# Patient Record
Sex: MALE | Race: WHITE | NOT HISPANIC OR LATINO | Employment: OTHER | URBAN - METROPOLITAN AREA
[De-identification: names, ages, dates, MRNs, and addresses within clinical notes are randomized per-mention and may not be internally consistent; named-entity substitution may affect disease eponyms.]

---

## 2017-01-16 ENCOUNTER — HOSPITAL ENCOUNTER (OUTPATIENT)
Dept: CT IMAGING | Facility: HOSPITAL | Age: 72
Discharge: HOME | End: 2017-01-16
Admitting: NURSE PRACTITIONER

## 2017-01-16 DIAGNOSIS — J15.7 PNEUMONIA OF RIGHT LOWER LOBE DUE TO MYCOPLASMA PNEUMONIAE: ICD-10-CM

## 2017-01-16 PROCEDURE — 71250 CT THORAX DX C-: CPT

## 2017-02-09 ENCOUNTER — OFFICE VISIT (OUTPATIENT)
Dept: PULMONOLOGY | Facility: CLINIC | Age: 72
End: 2017-02-09

## 2017-02-09 VITALS
HEART RATE: 70 BPM | BODY MASS INDEX: 27.24 KG/M2 | HEIGHT: 71 IN | WEIGHT: 194.6 LBS | OXYGEN SATURATION: 95 % | SYSTOLIC BLOOD PRESSURE: 100 MMHG | DIASTOLIC BLOOD PRESSURE: 60 MMHG | RESPIRATION RATE: 16 BRPM | TEMPERATURE: 97.4 F

## 2017-02-09 DIAGNOSIS — R91.8 ABNORMAL CT SCAN, LUNG: ICD-10-CM

## 2017-02-09 DIAGNOSIS — J15.7 PNEUMONIA OF RIGHT LOWER LOBE DUE TO MYCOPLASMA PNEUMONIAE: Primary | ICD-10-CM

## 2017-02-09 DIAGNOSIS — L13.8 LINEAR IGA DERMATOSIS: ICD-10-CM

## 2017-02-09 PROCEDURE — 99213 OFFICE O/P EST LOW 20 MIN: CPT | Performed by: NURSE PRACTITIONER

## 2017-02-09 NOTE — PROGRESS NOTES
[facility] Pulmonary Follow up    CHIEF COMPLAINT    Abnormal CT, follow-up    HISTORY OF PRESENT ILLNESS    Uriel Valverde is a 71 y.o.male here today for follow-up on his CT scan.  He has had a dense right lower lobe opacity starting back in December.  He completed a ten-day course of doxycycline.  His workup was negative except for a mycoplasma IgG that was elevated over 500.  He developed a cough while he was in [place] from August to November.  Today he comes in for follow-up and evaluation of his repeat CT scan.  His scan is dramatically improved since the one in December.  He has no cough.  He has no shortness of breath.  No fevers or chills.  He's followed up with infectious disease Dr. 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          Patient Active Problem List   Diagnosis   • Hypertension   • A-fib   • Allergic rhinitis   • Linear IgA dermatosis   • Insomnia   • Pneumonia of right lower lobe due to Mycoplasma pneumoniae   • Abnormal CT scan, lung       No Known Allergies    Current Outpatient Prescriptions:   •  allopurinol (ZYLOPRIM) 100 MG tablet, , Disp: , Rfl:   •  colchicine 0.6 MG tablet, Take 0.6 mg by mouth Daily., Disp: , Rfl:   •  dapsone 100 MG tablet, , Disp: , Rfl:   •  DiphenhydrAMINE HCl (BENADRYL ALLERGY PO), Take  by mouth., Disp: , Rfl:   •  doxazosin (CARDURA) 4 MG tablet, , Disp: , Rfl:   •  doxycycline (VIBRAMYICN) 100 MG tablet, , Disp: , Rfl:   •  Emollient (CETAPHIL DAILY ADVANCE) lotion, Apply  topically., Disp: , Rfl:   •  fexofenadine (ALLEGRA) 180 MG tablet, Take 180 mg by mouth Daily., Disp: , Rfl:   •  hydrOXYzine (ATARAX) 10 MG/5ML syrup, , Disp: , Rfl:   •  levoFLOXacin (LEVAQUIN) 750 MG tablet, , Disp: , Rfl:   •  mycophenolate (CELLCEPT) 500 MG tablet, , Disp: , Rfl:   •  traZODone (DESYREL) 100 MG tablet, , Disp: , Rfl:   •  Triamcinolone Acetonide 0.05 % ointment, Apply  topically., Disp: , Rfl:   •  warfarin (COUMADIN) 1 MG tablet, , Disp: ,  "Rfl:   MEDICATION LIST AND ALLERGIES REVIEWED.    Social History   Substance Use Topics   • Smoking status: Former Smoker     Packs/day: 0.50     Years: 20.00     Quit date: 12/22/1978   • Smokeless tobacco: Not on file   • Alcohol use 1.2 oz/week     2 Glasses of wine per week       FAMILY AND SOCIAL HISTORY REVIEWED.    Review of Systems   Constitutional: Negative for chills, fatigue, fever and unexpected weight change.   HENT: Negative for congestion, nosebleeds, postnasal drip, rhinorrhea, sinus pressure and trouble swallowing.    Respiratory: Negative for cough, chest tightness, shortness of breath and wheezing.    Cardiovascular: Negative for chest pain and leg swelling.   Gastrointestinal: Negative for abdominal pain, constipation, diarrhea, nausea and vomiting.   Genitourinary: Negative for dysuria, frequency, hematuria and urgency.   Musculoskeletal: Negative for myalgias.   Neurological: Negative for dizziness, weakness, numbness and headaches.   All other systems reviewed and are negative.  .    Visit Vitals   • /60   • Pulse 70   • Temp 97.4 °F (36.3 °C)   • Resp 16   • Ht 71\" (180.3 cm)   • Wt 194 lb 9.6 oz (88.3 kg)   • SpO2 95%  Comment: RA   • BMI 27.14 kg/m2     Physical Exam   Constitutional: He is oriented to person, place, and time. He appears well-developed and well-nourished.   HENT:   Head: Normocephalic and atraumatic.   Eyes: EOM are normal. Pupils are equal, round, and reactive to light.   Neck: Normal range of motion. Neck supple.   Cardiovascular: Normal rate and regular rhythm.    No murmur heard.  Pulmonary/Chest: Effort normal and breath sounds normal. No respiratory distress. He has no wheezes. He has no rales.   Abdominal: Soft. Bowel sounds are normal. He exhibits no distension.   Musculoskeletal: Normal range of motion. He exhibits no edema.   Neurological: He is alert and oriented to person, place, and time.   Skin: Skin is warm and dry. No erythema.   Psychiatric: He has a " normal mood and affect. His behavior is normal.   Vitals reviewed.      RESULTS    CT chest 1/16/2017  1. Incomplete CT data set of the chest which fails to demonstrate the  lower thirds of the lungs.  2. There is residual airspace linear nodular opacity in the right lower  lobe, however. The degree of airspace opacity, the density of  consolidation and the air bronchograms are dramatically improved over  the 6 week interval since previous studies, however, the process is not  completely cleared. Continual follow-up in 6-8 weeks is suggested to  confirm total resolution of this process.  3. No other acute focal abnormality is identified.         PROBLEM LIST    Problem List Items Addressed This Visit        Respiratory    Pneumonia of right lower lobe due to Mycoplasma pneumoniae - Primary    Overview     Initially seen on CT scan 12/7/2016, treated with 10 day course of doxycycline and Levaquin            Musculoskeletal and Integument    Linear IgA dermatosis    Overview     Follow with Dr Whitten in [facility] for treatment - on cellcept            Other    Abnormal CT scan, lung    Overview     RLL opacity with air bronchograms                   DISCUSSION    His CT scan has significantly improved in the last 6 weeks.  He will need a follow-up scan in May per Dr. Candelario with infectious disease, to assure resolution.  He has a follow-up with Dr. Candelario after that.  Otherwise he is done quite well and his right lower lobe mycoplasma pneumonia seems to be resolving.    Follow-up as needed, or after his scan in May if any further services are needed.    Rivka Hyde, GARETT  02/09/20172:22 PM  Electronically signed     Please note that portions of this note were completed with a voice recognition program. Efforts were made to edit the dictations, but occasionally words are mistranscribed.      CC: Barbi Carlson MD

## 2017-02-14 ENCOUNTER — TRANSCRIBE ORDERS (OUTPATIENT)
Dept: ADMINISTRATIVE | Facility: HOSPITAL | Age: 72
End: 2017-02-14

## 2017-02-14 DIAGNOSIS — A31.2 DISSEMINATED MYCOBACTERIUM AVIUM-INTRACELLULARE COMPLEX (HCC): Primary | ICD-10-CM

## 2017-02-14 DIAGNOSIS — J18.1 PNEUMONIA, LOBAR (HCC): ICD-10-CM

## 2017-05-12 ENCOUNTER — HOSPITAL ENCOUNTER (OUTPATIENT)
Dept: CT IMAGING | Facility: HOSPITAL | Age: 72
Discharge: HOME | End: 2017-05-12
Attending: INTERNAL MEDICINE | Admitting: INTERNAL MEDICINE

## 2017-05-12 DIAGNOSIS — A31.2 DISSEMINATED MYCOBACTERIUM AVIUM-INTRACELLULARE COMPLEX (HCC): ICD-10-CM

## 2017-05-12 DIAGNOSIS — J18.1 PNEUMONIA, LOBAR (HCC): ICD-10-CM

## 2017-05-12 PROCEDURE — 71250 CT THORAX DX C-: CPT

## 2018-12-22 PROBLEM — E66.3 OVERWEIGHT (BMI 25.0-29.9): Status: ACTIVE | Noted: 2017-05-10

## 2018-12-22 PROBLEM — H10.31 ACUTE BACTERIAL CONJUNCTIVITIS OF RIGHT EYE: Status: ACTIVE | Noted: 2018-12-14

## 2018-12-22 PROBLEM — H66.015 RECURRENT ACUTE SUPPURATIVE OTITIS MEDIA WITH SPONTANEOUS RUPTURE OF LEFT TYMPANIC MEMBRANE: Status: ACTIVE | Noted: 2018-12-14

## 2019-01-02 ENCOUNTER — OFFICE VISIT (OUTPATIENT)
Dept: INTERNAL MEDICINE | Facility: CLINIC | Age: 74
End: 2019-01-02

## 2019-01-02 VITALS
HEART RATE: 66 BPM | HEIGHT: 75 IN | SYSTOLIC BLOOD PRESSURE: 128 MMHG | WEIGHT: 223 LBS | BODY MASS INDEX: 27.73 KG/M2 | TEMPERATURE: 98.4 F | DIASTOLIC BLOOD PRESSURE: 84 MMHG

## 2019-01-02 DIAGNOSIS — E53.8 B12 DEFICIENCY: ICD-10-CM

## 2019-01-02 DIAGNOSIS — E78.2 MIXED HYPERLIPIDEMIA: Chronic | ICD-10-CM

## 2019-01-02 DIAGNOSIS — E55.9 VITAMIN D DEFICIENCY: ICD-10-CM

## 2019-01-02 DIAGNOSIS — I10 BENIGN ESSENTIAL HYPERTENSION: ICD-10-CM

## 2019-01-02 DIAGNOSIS — I83.893 VARICOSE VEINS OF BOTH LEGS WITH EDEMA: ICD-10-CM

## 2019-01-02 DIAGNOSIS — I48.20 CHRONIC ATRIAL FIBRILLATION (HCC): Primary | ICD-10-CM

## 2019-01-02 DIAGNOSIS — Z79.01 ANTICOAGULATED ON COUMADIN: ICD-10-CM

## 2019-01-02 PROBLEM — H66.015 RECURRENT ACUTE SUPPURATIVE OTITIS MEDIA WITH SPONTANEOUS RUPTURE OF LEFT TYMPANIC MEMBRANE: Status: RESOLVED | Noted: 2018-12-14 | Resolved: 2019-01-02

## 2019-01-02 PROCEDURE — 99214 OFFICE O/P EST MOD 30 MIN: CPT | Performed by: INTERNAL MEDICINE

## 2019-01-02 RX ORDER — INFLUENZA VIRUS VACCINE 15; 15; 15; 15 UG/.5ML; UG/.5ML; UG/.5ML; UG/.5ML
SUSPENSION INTRAMUSCULAR
Refills: 0 | COMMUNITY
Start: 2018-10-10 | End: 2020-01-09

## 2019-01-02 RX ORDER — WARFARIN SODIUM 5 MG/1
TABLET ORAL
COMMUNITY
Start: 2018-10-07 | End: 2019-06-24 | Stop reason: SDUPTHER

## 2019-01-02 RX ORDER — WARFARIN SODIUM 10 MG/1
TABLET ORAL
COMMUNITY
Start: 2018-11-19 | End: 2019-05-20 | Stop reason: SDUPTHER

## 2019-01-02 RX ORDER — WARFARIN SODIUM 4 MG/1
TABLET ORAL
COMMUNITY
Start: 2018-10-07 | End: 2019-06-24 | Stop reason: SDUPTHER

## 2019-01-02 RX ORDER — CHOLECALCIFEROL (VITAMIN D3) 125 MCG
1 CAPSULE ORAL DAILY
COMMUNITY
Start: 2016-06-14 | End: 2021-07-21

## 2019-01-02 RX ORDER — THIAMINE HCL 100 MG
1 TABLET ORAL EVERY OTHER DAY
COMMUNITY
Start: 2017-05-10 | End: 2020-01-16

## 2019-01-02 RX ORDER — WARFARIN SODIUM 1 MG/1
TABLET ORAL
COMMUNITY
Start: 2018-06-08 | End: 2020-01-09

## 2019-01-02 RX ORDER — FOLIC ACID 1 MG/1
1 TABLET ORAL DAILY
COMMUNITY
Start: 2017-10-10

## 2019-01-02 NOTE — PROGRESS NOTES
[facility]     Uriel Valverde  1945   [id number]      Patient Care Team:  Barbi Carlson MD as PCP - General (Internal Medicine)  Ashley Knight PA as Physician Assistant (Dermatology)  Juana Vyas MD as Consulting Physician (Dermatology)    Chief Complaint;:   Chief Complaint   Patient presents with   • Follow-up            HPI  Patient is a 73 y.o. male presents with f/u of afib and anticoagulation  CHRONIC CONDITIONS  He takes medications as prescribed. Gets pt/INR as ordered.  No signs of bleeding. No rapid heart rate or palpitations. Denies chest pain/edema/shortness of breath. He eats a low fat diet and exercises regularly.     Past Medical History:   Diagnosis Date   • A-fib (CMS/HCC)     chronic coumadin   • A-fib (CMS/HCC)     failed cardioversion, normal echo and exercise nuclear stress test.  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   • Insomnia    • Linear IgA dermatosis     Follow with Dr Whitten in [facility] for treatment - on cellcept   • Spongiotic dermatitis    • Vitamin B12 deficiency        Past Surgical History:   Procedure Laterality Date   • HERNIA REPAIR     • KNEE ARTHROSCOPY Right 2003    due to meniscus tear   • TONSILLECTOMY      as a child       Family History   Problem Relation Age of Onset   • Heart failure Father    • Stroke Sister 63       Social History     Socioeconomic History   • Marital status:      Spouse name: Not on file   • Number of children: Not on file   • Years of education: Not on file   • Highest education level: Not on file   Social Needs   • Financial resource strain: Not on file   • Food insecurity - worry: Not on file   • Food insecurity - inability: Not on file   • Transportation needs -  "medical: Not on file   • Transportation needs - non-medical: Not on file   Occupational History   • Not on file   Tobacco Use   • Smoking status: Former Smoker     Packs/day: 0.50     Years: 20.00     Pack years: 10.00     Last attempt to quit: 1978     Years since quittin.0   Substance and Sexual Activity   • Alcohol use: Yes     Alcohol/week: 1.2 oz     Types: 2 Glasses of wine per week   • Drug use: No   • Sexual activity: Not on file   Other Topics Concern   • Not on file   Social History Narrative   • Not on file       Allergies   Allergen Reactions   • Other Rash     Soy, mold, and wheat       Review of Systems:     Review of Systems   Constitutional: Negative for fever, unexpected weight gain and unexpected weight loss.   HENT: Negative for sore throat and trouble swallowing.    Eyes: Negative for visual disturbance.   Respiratory: Negative for cough, shortness of breath and wheezing.    Cardiovascular: Negative for chest pain, palpitations and leg swelling.   Gastrointestinal: Negative for abdominal pain, blood in stool and diarrhea.   Endocrine: Negative for cold intolerance and heat intolerance.   Genitourinary: Negative for difficulty urinating and dysuria.   Musculoskeletal: Negative for back pain, gait problem and joint swelling.   Skin: Negative for rash and skin lesions.   Allergic/Immunologic: Negative for food allergies and immunocompromised state.   Neurological: Negative for dizziness and memory problem.   Hematological: Negative for adenopathy. Does not bruise/bleed easily.   Psychiatric/Behavioral: Negative for sleep disturbance and depressed mood. The patient is not nervous/anxious.        Vital Signs  Vitals:    19 1435   BP: 128/84   BP Location: Left arm   Patient Position: Sitting   Cuff Size: Adult   Pulse: 66   Temp: 98.4 °F (36.9 °C)   TempSrc: Temporal   Weight: 101 kg (223 lb)   Height: 190.5 cm (75\")   PainSc: 0-No pain   Body mass index is 27.87 kg/m².        Current " Outpatient Medications:   •  Cholecalciferol (VITAMIN D3) 2000 units tablet, Take 1 tablet by mouth Daily., Disp: , Rfl:   •  folic acid (FOLVITE) 1 MG tablet, Take 1 tablet by mouth Daily., Disp: , Rfl:   •  vitamin B-12 (CYANOCOBALAMIN) 2500 MCG sublingual tablet tablet, Place 1 tablet under the tongue Every Other Day., Disp: , Rfl:   •  warfarin (COUMADIN) 1 MG tablet, Take  by mouth., Disp: , Rfl:   •  warfarin (COUMADIN) 10 MG tablet, Take  by mouth., Disp: , Rfl:   •  warfarin (COUMADIN) 4 MG tablet, Take  by mouth., Disp: , Rfl:   •  warfarin (COUMADIN) 5 MG tablet, Take  by mouth., Disp: , Rfl:   •  allopurinol (ZYLOPRIM) 100 MG tablet, , Disp: , Rfl:   •  colchicine 0.6 MG tablet, Take 0.6 mg by mouth Daily., Disp: , Rfl:   •  dapsone 100 MG tablet, , Disp: , Rfl:   •  DiphenhydrAMINE HCl (BENADRYL ALLERGY PO), Take  by mouth., Disp: , Rfl:   •  doxazosin (CARDURA) 4 MG tablet, , Disp: , Rfl:   •  doxycycline (VIBRAMYICN) 100 MG tablet, , Disp: , Rfl:   •  Emollient (CETAPHIL DAILY ADVANCE) lotion, Apply  topically., Disp: , Rfl:   •  fexofenadine (ALLEGRA) 180 MG tablet, Take 180 mg by mouth Daily., Disp: , Rfl:   •  FLUARIX QUADRIVALENT 0.5 ML suspension prefilled syringe injection, ADM 0.5ML IM UTD, Disp: , Rfl: 0  •  hydrOXYzine (ATARAX) 10 MG/5ML syrup, , Disp: , Rfl:   •  levoFLOXacin (LEVAQUIN) 750 MG tablet, , Disp: , Rfl:   •  mycophenolate (CELLCEPT) 500 MG tablet, , Disp: , Rfl:   •  traZODone (DESYREL) 100 MG tablet, , Disp: , Rfl:   •  Triamcinolone Acetonide 0.05 % ointment, Apply  topically., Disp: , Rfl:     Physical Exam:    Physical Exam   Constitutional: He is oriented to person, place, and time. He appears well-developed and well-nourished.   HENT:   Head: Normocephalic.   Eyes: EOM are normal. Pupils are equal, round, and reactive to light.   Neck: Normal range of motion. Neck supple.   Cardiovascular: Normal rate, normal heart sounds and normal pulses. An irregularly irregular rhythm  present.   Pulmonary/Chest: Effort normal and breath sounds normal.   Neurological: He is alert and oriented to person, place, and time. Coordination and gait normal.   Psychiatric: He has a normal mood and affect. Thought content normal.        Results Review:     None    Medication Review: Medications reviewed and noted    Robin was seen today for follow-up.   Diagnosis Plan   1. Chronic atrial fibrillation (CMS/HCC)     2. Mixed hyperlipidemia  Comprehensive metabolic panel    Lipid Panel With LDL/HDL Ratio   3. Benign essential hypertension  CBC w AUTO Differential    Microalbumin / Creatinine Urine Ratio - Urine, Clean Catch   4. Varicose veins of both legs with edema     5. Anticoagulated on Coumadin  Protime-INR   6. Vitamin D deficiency  Vitamin D 25 hydroxy   7. B12 deficiency  Vitamin B12    Folate         Continue current medications. Continue regular pt/INR. He will come back for labs in a week or two.  Continue regular exercise and low fat diet.      Plan of care reviewed with patient at the conclusion of today's visit. Education was provided regarding diagnosis, management, and any prescribed or recommended OTC medications.Patient verbalizes understanding of and agreement with management plan.         Barbi Carlson MD

## 2019-01-17 ENCOUNTER — LAB (OUTPATIENT)
Dept: LAB | Facility: HOSPITAL | Age: 74
End: 2019-01-17

## 2019-01-17 DIAGNOSIS — E53.8 B12 DEFICIENCY: ICD-10-CM

## 2019-01-17 DIAGNOSIS — I10 BENIGN ESSENTIAL HYPERTENSION: ICD-10-CM

## 2019-01-17 DIAGNOSIS — E55.9 VITAMIN D DEFICIENCY: ICD-10-CM

## 2019-01-17 DIAGNOSIS — E78.2 MIXED HYPERLIPIDEMIA: Chronic | ICD-10-CM

## 2019-01-17 DIAGNOSIS — Z79.01 ANTICOAGULATED ON COUMADIN: ICD-10-CM

## 2019-01-17 LAB
25(OH)D3 SERPL-MCNC: 40.4 NG/ML
ALBUMIN SERPL-MCNC: 3.88 G/DL (ref 3.2–4.8)
ALBUMIN/GLOB SERPL: 2.1 G/DL (ref 1.5–2.5)
ALP SERPL-CCNC: 70 U/L (ref 25–100)
ALT SERPL W P-5'-P-CCNC: 25 U/L (ref 7–40)
ANION GAP SERPL CALCULATED.3IONS-SCNC: 4 MMOL/L (ref 3–11)
AST SERPL-CCNC: 23 U/L (ref 0–33)
BASOPHILS # BLD AUTO: 0.02 10*3/MM3 (ref 0–0.2)
BASOPHILS NFR BLD AUTO: 0.3 % (ref 0–1)
BILIRUB SERPL-MCNC: 0.7 MG/DL (ref 0.3–1.2)
BUN BLD-MCNC: 26 MG/DL (ref 9–23)
BUN/CREAT SERPL: 21.8 (ref 7–25)
CALCIUM SPEC-SCNC: 8.7 MG/DL (ref 8.7–10.4)
CHLORIDE SERPL-SCNC: 108 MMOL/L (ref 99–109)
CHOLEST SERPL-MCNC: 197 MG/DL (ref 0–200)
CO2 SERPL-SCNC: 30 MMOL/L (ref 20–31)
CREAT BLD-MCNC: 1.19 MG/DL (ref 0.6–1.3)
DEPRECATED RDW RBC AUTO: 49.1 FL (ref 37–54)
EOSINOPHIL # BLD AUTO: 0.23 10*3/MM3 (ref 0–0.3)
EOSINOPHIL NFR BLD AUTO: 2.9 % (ref 0–3)
ERYTHROCYTE [DISTWIDTH] IN BLOOD BY AUTOMATED COUNT: 13.7 % (ref 11.3–14.5)
FOLATE SERPL-MCNC: 22.76 NG/ML (ref 3.2–20)
GFR SERPL CREATININE-BSD FRML MDRD: 60 ML/MIN/1.73
GLOBULIN UR ELPH-MCNC: 1.8 GM/DL
GLUCOSE BLD-MCNC: 108 MG/DL (ref 70–100)
HCT VFR BLD AUTO: 41.8 % (ref 38.9–50.9)
HDLC SERPL-MCNC: 67 MG/DL (ref 40–60)
HGB BLD-MCNC: 14.2 G/DL (ref 13.1–17.5)
IMM GRANULOCYTES # BLD AUTO: 0.02 10*3/MM3 (ref 0–0.03)
IMM GRANULOCYTES NFR BLD AUTO: 0.3 % (ref 0–0.6)
INR PPP: 2.72 (ref 0.85–1.16)
LDLC SERPL CALC-MCNC: 113 MG/DL (ref 0–100)
LDLC/HDLC SERPL: 1.69 {RATIO}
LYMPHOCYTES # BLD AUTO: 4.03 10*3/MM3 (ref 0.6–4.8)
LYMPHOCYTES NFR BLD AUTO: 51.5 % (ref 24–44)
MCH RBC QN AUTO: 34.1 PG (ref 27–31)
MCHC RBC AUTO-ENTMCNC: 34 G/DL (ref 32–36)
MCV RBC AUTO: 100.2 FL (ref 80–99)
MONOCYTES # BLD AUTO: 0.31 10*3/MM3 (ref 0–1)
MONOCYTES NFR BLD AUTO: 4 % (ref 0–12)
NEUTROPHILS # BLD AUTO: 3.23 10*3/MM3 (ref 1.5–8.3)
NEUTROPHILS NFR BLD AUTO: 41.3 % (ref 41–71)
PLATELET # BLD AUTO: 158 10*3/MM3 (ref 150–450)
PMV BLD AUTO: 11.9 FL (ref 6–12)
POTASSIUM BLD-SCNC: 4.5 MMOL/L (ref 3.5–5.5)
PROT SERPL-MCNC: 5.7 G/DL (ref 5.7–8.2)
PROTHROMBIN TIME: 27.7 SECONDS (ref 11.2–14.3)
RBC # BLD AUTO: 4.17 10*6/MM3 (ref 4.2–5.76)
SODIUM BLD-SCNC: 142 MMOL/L (ref 132–146)
TRIGL SERPL-MCNC: 84 MG/DL (ref 0–150)
VIT B12 BLD-MCNC: 1425 PG/ML (ref 211–911)
VLDLC SERPL-MCNC: 16.8 MG/DL
WBC NRBC COR # BLD: 7.82 10*3/MM3 (ref 3.5–10.8)

## 2019-01-17 PROCEDURE — 80061 LIPID PANEL: CPT

## 2019-01-17 PROCEDURE — 82607 VITAMIN B-12: CPT

## 2019-01-17 PROCEDURE — 36415 COLL VENOUS BLD VENIPUNCTURE: CPT

## 2019-01-17 PROCEDURE — 82746 ASSAY OF FOLIC ACID SERUM: CPT

## 2019-01-17 PROCEDURE — 82043 UR ALBUMIN QUANTITATIVE: CPT

## 2019-01-17 PROCEDURE — 82306 VITAMIN D 25 HYDROXY: CPT

## 2019-01-17 PROCEDURE — 80053 COMPREHEN METABOLIC PANEL: CPT

## 2019-01-17 PROCEDURE — 85610 PROTHROMBIN TIME: CPT

## 2019-01-17 PROCEDURE — 82570 ASSAY OF URINE CREATININE: CPT

## 2019-01-17 PROCEDURE — 85025 COMPLETE CBC W/AUTO DIFF WBC: CPT

## 2019-01-18 LAB
CREAT 24H UR-MCNC: 132.5 MG/DL
MICROALBUMIN UR-MCNC: 3.1 UG/ML
MICROALBUMIN/CREAT UR: 2.3 MG/G CREAT (ref 0–30)

## 2019-02-12 ENCOUNTER — LAB (OUTPATIENT)
Dept: LAB | Facility: HOSPITAL | Age: 74
End: 2019-02-12

## 2019-02-12 ENCOUNTER — TRANSCRIBE ORDERS (OUTPATIENT)
Dept: LAB | Facility: HOSPITAL | Age: 74
End: 2019-02-12

## 2019-02-12 DIAGNOSIS — N13.8 ENLARGED PROSTATE WITH URINARY OBSTRUCTION: Primary | ICD-10-CM

## 2019-02-12 DIAGNOSIS — N40.1 ENLARGED PROSTATE WITH URINARY OBSTRUCTION: Primary | ICD-10-CM

## 2019-02-12 DIAGNOSIS — N40.1 ENLARGED PROSTATE WITH URINARY OBSTRUCTION: ICD-10-CM

## 2019-02-12 DIAGNOSIS — N13.8 ENLARGED PROSTATE WITH URINARY OBSTRUCTION: ICD-10-CM

## 2019-02-12 LAB — PSA SERPL-MCNC: 1.76 NG/ML (ref 0–4)

## 2019-02-12 PROCEDURE — 84153 ASSAY OF PSA TOTAL: CPT

## 2019-02-12 PROCEDURE — 36415 COLL VENOUS BLD VENIPUNCTURE: CPT

## 2019-02-21 ENCOUNTER — LAB (OUTPATIENT)
Dept: LAB | Facility: HOSPITAL | Age: 74
End: 2019-02-21

## 2019-02-21 ENCOUNTER — CLINICAL SUPPORT (OUTPATIENT)
Dept: INTERNAL MEDICINE | Facility: CLINIC | Age: 74
End: 2019-02-21

## 2019-02-21 DIAGNOSIS — I48.20 CHRONIC ATRIAL FIBRILLATION (HCC): ICD-10-CM

## 2019-02-21 DIAGNOSIS — I48.20 CHRONIC ATRIAL FIBRILLATION (HCC): Primary | ICD-10-CM

## 2019-02-21 LAB
INR PPP: 2.11 (ref 0.85–1.16)
PROTHROMBIN TIME: 22.8 SECONDS (ref 11.2–14.3)

## 2019-02-21 PROCEDURE — 85610 PROTHROMBIN TIME: CPT

## 2019-02-21 PROCEDURE — 36415 COLL VENOUS BLD VENIPUNCTURE: CPT

## 2019-03-22 ENCOUNTER — LAB (OUTPATIENT)
Dept: LAB | Facility: HOSPITAL | Age: 74
End: 2019-03-22

## 2019-03-22 DIAGNOSIS — I48.20 CHRONIC ATRIAL FIBRILLATION (HCC): ICD-10-CM

## 2019-03-22 LAB
INR PPP: 2.31 (ref 0.85–1.16)
PROTHROMBIN TIME: 24.4 SECONDS (ref 11.2–14.3)

## 2019-03-22 PROCEDURE — 85610 PROTHROMBIN TIME: CPT

## 2019-03-22 PROCEDURE — 36415 COLL VENOUS BLD VENIPUNCTURE: CPT

## 2019-04-18 ENCOUNTER — LAB (OUTPATIENT)
Dept: LAB | Facility: HOSPITAL | Age: 74
End: 2019-04-18

## 2019-04-18 DIAGNOSIS — I48.20 CHRONIC ATRIAL FIBRILLATION (HCC): ICD-10-CM

## 2019-04-18 LAB
INR PPP: 2.47 (ref 0.85–1.16)
PROTHROMBIN TIME: 25.8 SECONDS (ref 11.2–14.3)

## 2019-04-18 PROCEDURE — 36415 COLL VENOUS BLD VENIPUNCTURE: CPT

## 2019-04-18 PROCEDURE — 85610 PROTHROMBIN TIME: CPT

## 2019-05-16 ENCOUNTER — LAB (OUTPATIENT)
Dept: LAB | Facility: HOSPITAL | Age: 74
End: 2019-05-16

## 2019-05-16 DIAGNOSIS — I48.20 CHRONIC ATRIAL FIBRILLATION (HCC): ICD-10-CM

## 2019-05-16 LAB
INR PPP: 2.65 (ref 0.85–1.16)
PROTHROMBIN TIME: 27.2 SECONDS (ref 11.2–14.3)

## 2019-05-16 PROCEDURE — 85610 PROTHROMBIN TIME: CPT

## 2019-05-16 PROCEDURE — 36415 COLL VENOUS BLD VENIPUNCTURE: CPT

## 2019-05-20 RX ORDER — WARFARIN SODIUM 10 MG/1
10 TABLET ORAL NIGHTLY
Qty: 45 TABLET | Refills: 0 | Status: SHIPPED | OUTPATIENT
Start: 2019-05-20 | End: 2019-06-24 | Stop reason: SDUPTHER

## 2019-06-20 ENCOUNTER — LAB (OUTPATIENT)
Dept: LAB | Facility: HOSPITAL | Age: 74
End: 2019-06-20

## 2019-06-20 DIAGNOSIS — I48.20 CHRONIC ATRIAL FIBRILLATION (HCC): ICD-10-CM

## 2019-06-20 DIAGNOSIS — N13.8 ENLARGED PROSTATE WITH URINARY OBSTRUCTION: ICD-10-CM

## 2019-06-20 DIAGNOSIS — N40.1 ENLARGED PROSTATE WITH URINARY OBSTRUCTION: ICD-10-CM

## 2019-06-20 LAB
INR PPP: 2.46 (ref 0.85–1.16)
PROTHROMBIN TIME: 25.7 SECONDS (ref 11.2–14.3)
PSA SERPL-MCNC: 2.51 NG/ML (ref 0–4)

## 2019-06-20 PROCEDURE — 85610 PROTHROMBIN TIME: CPT

## 2019-06-20 PROCEDURE — G0103 PSA SCREENING: HCPCS

## 2019-06-20 PROCEDURE — 36415 COLL VENOUS BLD VENIPUNCTURE: CPT

## 2019-06-24 RX ORDER — WARFARIN SODIUM 10 MG/1
TABLET ORAL
Qty: 45 TABLET | Refills: 0
Start: 2019-06-24 | End: 2019-08-12 | Stop reason: SDUPTHER

## 2019-06-24 RX ORDER — WARFARIN SODIUM 4 MG/1
TABLET ORAL
Qty: 45 TABLET | Refills: 3 | Status: SHIPPED | OUTPATIENT
Start: 2019-06-24 | End: 2020-05-11 | Stop reason: SDUPTHER

## 2019-06-24 RX ORDER — WARFARIN SODIUM 5 MG/1
TABLET ORAL
Qty: 45 TABLET | Refills: 3 | Status: SHIPPED | OUTPATIENT
Start: 2019-06-24 | End: 2020-05-11 | Stop reason: SDUPTHER

## 2019-07-18 ENCOUNTER — LAB (OUTPATIENT)
Dept: LAB | Facility: HOSPITAL | Age: 74
End: 2019-07-18

## 2019-07-18 DIAGNOSIS — I48.20 CHRONIC ATRIAL FIBRILLATION (HCC): ICD-10-CM

## 2019-07-18 LAB
INR PPP: 3.35 (ref 0.85–1.16)
PROTHROMBIN TIME: 32.6 SECONDS (ref 11.2–14.3)

## 2019-07-18 PROCEDURE — 36415 COLL VENOUS BLD VENIPUNCTURE: CPT

## 2019-07-18 PROCEDURE — 85610 PROTHROMBIN TIME: CPT

## 2019-07-29 ENCOUNTER — PATIENT MESSAGE (OUTPATIENT)
Dept: INTERNAL MEDICINE | Facility: CLINIC | Age: 74
End: 2019-07-29

## 2019-07-30 RX ORDER — DOXAZOSIN MESYLATE 4 MG/1
4 TABLET ORAL NIGHTLY
Qty: 90 TABLET | Refills: 1 | Status: SHIPPED | OUTPATIENT
Start: 2019-07-30 | End: 2020-01-16 | Stop reason: SDUPTHER

## 2019-07-30 NOTE — TELEPHONE ENCOUNTER
From: Uriel Valverde  To: Barbi Carlson MD  Sent: 7/29/2019 10:15 PM EDT  Subject: Prescription Question    I need a prescription for Doxazosin Mesylate 4 mg tablet  90-day supply with 3 refills    Thank you    Uriel Valverde

## 2019-08-01 ENCOUNTER — LAB (OUTPATIENT)
Dept: LAB | Facility: HOSPITAL | Age: 74
End: 2019-08-01

## 2019-08-01 DIAGNOSIS — I48.20 CHRONIC ATRIAL FIBRILLATION (HCC): ICD-10-CM

## 2019-08-01 LAB
INR PPP: 2.61 (ref 0.85–1.16)
PROTHROMBIN TIME: 26.8 SECONDS (ref 11.2–14.3)

## 2019-08-01 PROCEDURE — 36415 COLL VENOUS BLD VENIPUNCTURE: CPT

## 2019-08-01 PROCEDURE — 85610 PROTHROMBIN TIME: CPT

## 2019-08-10 ENCOUNTER — PATIENT MESSAGE (OUTPATIENT)
Dept: INTERNAL MEDICINE | Facility: CLINIC | Age: 74
End: 2019-08-10

## 2019-08-12 RX ORDER — WARFARIN SODIUM 10 MG/1
TABLET ORAL
Qty: 45 TABLET | Refills: 3 | Status: SHIPPED | OUTPATIENT
Start: 2019-08-12 | End: 2020-05-11 | Stop reason: SDUPTHER

## 2019-08-12 NOTE — TELEPHONE ENCOUNTER
From: Uriel Valverde  To: Barbi Carlson MD  Sent: 8/10/2019 10:03 PM EDT  Subject: Prescription Question    I need a prescription refill, 90 days with 3 refills  Warfarin 10 mg tablet    Uriel Valverde

## 2019-08-21 ENCOUNTER — TELEPHONE (OUTPATIENT)
Dept: INTERNAL MEDICINE | Facility: CLINIC | Age: 74
End: 2019-08-21

## 2019-08-21 DIAGNOSIS — I48.20 CHRONIC ATRIAL FIBRILLATION (HCC): Primary | ICD-10-CM

## 2019-08-21 NOTE — TELEPHONE ENCOUNTER
Regarding: Test Results Question  Contact: [phone]  ----- Message from Maria L Cadena MA sent at 8/21/2019  7:59 AM EDT -----       ----- Message from Uriel Valverde to Barbi Carlson MD sent at 8/21/2019  5:04 AM -----   Dr. Carlson,    I need an INR PTR order to take with me to [facility]. I need the order in my hands by August 27, 2019. Please let me know when it is ready for pickup at your office. Thank you.    Uriel Valverde

## 2019-08-21 NOTE — TELEPHONE ENCOUNTER
I printed PT/INR order for him to take to [facility].  Call him to see if he wants it mailed to him or faxed or what ever

## 2019-09-27 ENCOUNTER — PATIENT MESSAGE (OUTPATIENT)
Dept: INTERNAL MEDICINE | Facility: CLINIC | Age: 74
End: 2019-09-27

## 2019-10-09 ENCOUNTER — LAB (OUTPATIENT)
Dept: LAB | Facility: HOSPITAL | Age: 74
End: 2019-10-09

## 2019-10-09 DIAGNOSIS — I48.20 CHRONIC ATRIAL FIBRILLATION (HCC): ICD-10-CM

## 2019-10-09 LAB
INR PPP: 2.56 (ref 0.85–1.16)
PROTHROMBIN TIME: 26.5 SECONDS (ref 11.2–14.3)

## 2019-10-09 PROCEDURE — 85610 PROTHROMBIN TIME: CPT

## 2019-11-03 ENCOUNTER — PATIENT MESSAGE (OUTPATIENT)
Dept: INTERNAL MEDICINE | Facility: CLINIC | Age: 74
End: 2019-11-03

## 2019-11-04 RX ORDER — ALLOPURINOL 100 MG/1
100 TABLET ORAL DAILY
Qty: 90 TABLET | Refills: 1 | Status: SHIPPED | OUTPATIENT
Start: 2019-11-04 | End: 2020-05-11 | Stop reason: SDUPTHER

## 2019-11-04 NOTE — TELEPHONE ENCOUNTER
From: Uriel Valverde  To: Barbi Carlson MD  Sent: 11/3/2019 4:06 AM EST  Subject: Prescription Question    Please renew 90-day prescription with three refills for  Allopurinol 100 mg.    Thank you.    Uriel Valverde

## 2019-11-14 ENCOUNTER — LAB (OUTPATIENT)
Dept: LAB | Facility: HOSPITAL | Age: 74
End: 2019-11-14

## 2019-11-14 DIAGNOSIS — I48.20 CHRONIC ATRIAL FIBRILLATION (HCC): ICD-10-CM

## 2019-11-14 LAB
INR PPP: 2.11 (ref 0.85–1.16)
PROTHROMBIN TIME: 22.7 SECONDS (ref 11.2–14.3)

## 2019-11-14 PROCEDURE — 85610 PROTHROMBIN TIME: CPT

## 2019-11-14 PROCEDURE — 36415 COLL VENOUS BLD VENIPUNCTURE: CPT

## 2019-12-12 ENCOUNTER — PATIENT MESSAGE (OUTPATIENT)
Dept: INTERNAL MEDICINE | Facility: CLINIC | Age: 74
End: 2019-12-12

## 2019-12-12 ENCOUNTER — LAB (OUTPATIENT)
Dept: LAB | Facility: HOSPITAL | Age: 74
End: 2019-12-12

## 2019-12-12 DIAGNOSIS — I48.20 CHRONIC ATRIAL FIBRILLATION (HCC): ICD-10-CM

## 2019-12-12 LAB
INR PPP: 2.49 (ref 0.85–1.16)
PROTHROMBIN TIME: 25.9 SECONDS (ref 11.2–14.3)

## 2019-12-12 PROCEDURE — 36415 COLL VENOUS BLD VENIPUNCTURE: CPT

## 2019-12-12 PROCEDURE — 85610 PROTHROMBIN TIME: CPT

## 2019-12-12 NOTE — TELEPHONE ENCOUNTER
From: Uriel Valverde  To: Barbi Carlson MD  Sent: 12/12/2019 8:13 AM EST  Subject: Non-Urgent Medical Question    I do not find a date for my next physical. Please check this out and let me know. Thank you.    Uriel Valverde

## 2020-01-09 ENCOUNTER — OFFICE VISIT (OUTPATIENT)
Dept: INTERNAL MEDICINE | Facility: CLINIC | Age: 75
End: 2020-01-09

## 2020-01-09 VITALS
DIASTOLIC BLOOD PRESSURE: 80 MMHG | SYSTOLIC BLOOD PRESSURE: 120 MMHG | HEIGHT: 73 IN | BODY MASS INDEX: 30.22 KG/M2 | WEIGHT: 228 LBS | TEMPERATURE: 97.6 F | HEART RATE: 60 BPM

## 2020-01-09 DIAGNOSIS — I10 BENIGN ESSENTIAL HYPERTENSION: ICD-10-CM

## 2020-01-09 DIAGNOSIS — C61 PROSTATE CANCER (HCC): ICD-10-CM

## 2020-01-09 DIAGNOSIS — E78.2 MIXED HYPERLIPIDEMIA: Primary | ICD-10-CM

## 2020-01-09 DIAGNOSIS — E53.8 B12 DEFICIENCY: ICD-10-CM

## 2020-01-09 DIAGNOSIS — I48.20 CHRONIC ATRIAL FIBRILLATION (HCC): ICD-10-CM

## 2020-01-09 DIAGNOSIS — E55.9 VITAMIN D DEFICIENCY: ICD-10-CM

## 2020-01-09 DIAGNOSIS — Z12.5 PROSTATE CANCER SCREENING: ICD-10-CM

## 2020-01-09 DIAGNOSIS — M10.9 ACUTE GOUT INVOLVING TOE, UNSPECIFIED CAUSE, UNSPECIFIED LATERALITY: ICD-10-CM

## 2020-01-09 PROCEDURE — G0439 PPPS, SUBSEQ VISIT: HCPCS | Performed by: PHYSICIAN ASSISTANT

## 2020-01-09 NOTE — PROGRESS NOTES
QUICK REFERENCE INFORMATION:  The ABCs of the Annual Wellness Visit    Subsequent Medicare Wellness Visit    HEALTH RISK ASSESSMENT    1945    Recent Hospitalizations:  No hospitalization(s) within the last year..        Current Medical Providers:  Patient Care Team:  Barbi Carlson MD as PCP - General (Internal Medicine)        Smoking Status:  Social History     Tobacco Use   Smoking Status Former Smoker   • Packs/day: 0.50   • Years: 20.00   • Pack years: 10.00   • Types: Cigarettes   • Last attempt to quit: 1978   • Years since quittin.0   Smokeless Tobacco Never Used   Tobacco Comment    no passive smoke exposure, quit 79       Alcohol Consumption:  Social History     Substance and Sexual Activity   Alcohol Use Yes   • Alcohol/week: 2.0 standard drinks   • Types: 2 Glasses of wine per week    Comment: daily       Depression Screen:   PHQ-2/PHQ-9 Depression Screening 2020   Little interest or pleasure in doing things 0   Feeling down, depressed, or hopeless 0   Total Score 0       Health Habits and Functional and Cognitive Screening:  Functional & Cognitive Status 2020   Do you have difficulty preparing food and eating? No   Do you have difficulty bathing yourself, getting dressed or grooming yourself? No   Do you have difficulty using the toilet? No   Do you have difficulty moving around from place to place? No   Do you have trouble with steps or getting out of a bed or a chair? No   Current Diet Unhealthy Diet   Dental Exam Up to date   Eye Exam Up to date   Exercise (times per week) 3 times per week   Current Exercise Activities Include Walking   Do you need help using the phone?  No   Are you deaf or do you have serious difficulty hearing?  No   Do you need help with transportation? No   Do you need help shopping? No   Do you need help preparing meals?  No   Do you need help with housework?  No   Do you need help with laundry? No   Do you need help taking your medications?  No   Do you need help managing money? No   Do you ever drive or ride in a car without wearing a seat belt? No   Have you felt unusual stress, anger or loneliness in the last month? No   Who do you live with? Spouse   If you need help, do you have trouble finding someone available to you? No   Have you been bothered in the last four weeks by sexual problems? No   Do you have difficulty concentrating, remembering or making decisions? No       Fall Risk Screen:  JONNY Fall Risk Assessment was completed, and patient is at LOW risk for falls.Assessment completed on:2020    ACE III MINI   ATTENTION  What is the year: correct  What is the month of the year: correct  What is the day of the week?: correct  What is the date?: correct  MEMORY  Repeat address three times, only score third attempt: [address], [place]: 6  HOW MANY ANIMALS DID THE PATIENT NAME  Verbal Fluency -- Animal Names (0-25): 22+  CLOCK DRAWING  Clock Drawing: All Correct  MEMORY RECALL  Tell me what you remember about that name and address we were repeating at the beginnin  ACE TOTAL SCORE  Total ACE Score - <25/30 strongly suggests cognitive impairment; <21/30 almost certainly shows dementia: 29    Does the patient have evidence of cognitive impairment? No    Aspirin use counseling: Does not need ASA (and currently is not on it)    Recent Lab Results:  CMP:  Lab Results   Component Value Date    BUN 26 (H) 2019    CREATININE 1.19 2019    EGFRIFNONA 60 (L) 2019    BCR 21.8 2019     2019    K 4.5 2019    CO2 30.0 2019    CALCIUM 8.7 2019    ALBUMIN 3.88 2019    BILITOT 0.7 2019    ALKPHOS 70 2019    AST 23 2019    ALT 25 2019     HbA1c:  No results found for: HGBA1C  Microalbumin:  Lab Results   Component Value Date    MICROALBUR 3.1 2019     Lipid Panel  Lab Results   Component Value Date    CHOL 197 2019    TRIG 84  01/17/2019    HDL 67 (H) 01/17/2019     (H) 01/17/2019    AST 23 01/17/2019    ALT 25 01/17/2019       Visual Acuity:  No exam data present    Age-appropriate Screening Schedule:  Refer to the list below for future screening recommendations based on patient's age, sex and/or medical conditions. Orders for these recommended tests are listed in the plan section. The patient has been provided with a written plan.    Health Maintenance   Topic Date Due   • ZOSTER VACCINE (2 of 3) 12/14/2018   • COLONOSCOPY  01/02/2019   • INFLUENZA VACCINE  08/01/2019   • LIPID PANEL  01/17/2020   • TDAP/TD VACCINES (3 - Td) 09/10/2022        Subjective   History of Present Illness    Uriel Valverde is a 74 y.o. male who presents for a Subsequent Wellness Visit.    CHRONIC CONDITIONS    The following portions of the patient's history were reviewed and updated as appropriate: allergies, current medications, past family history, past medical history, past social history, past surgical history and problem list.    Outpatient Medications Prior to Visit   Medication Sig Dispense Refill   • allopurinol (ZYLOPRIM) 100 MG tablet Take 1 tablet by mouth Daily. 90 tablet 1   • Cholecalciferol (VITAMIN D3) 2000 units tablet Take 1 tablet by mouth Daily.     • doxazosin (CARDURA) 4 MG tablet Take 1 tablet by mouth Every Night. 90 tablet 1   • doxycycline (VIBRAMYICN) 100 MG tablet      • fexofenadine (ALLEGRA) 180 MG tablet Take 180 mg by mouth Daily.     • folic acid (FOLVITE) 1 MG tablet Take 1 tablet by mouth Daily.     • vitamin B-12 (CYANOCOBALAMIN) 2500 MCG sublingual tablet tablet Place 1 tablet under the tongue Every Other Day.     • warfarin (COUMADIN) 10 MG tablet Take 1 tablet by mouth every other day, alternating days with 4mg and 5mg tabs (9mg total) 45 tablet 3   • warfarin (COUMADIN) 4 MG tablet Take 1 tablet by mouth every other day with 5mg tab, alternating days with 10mg 45 tablet 3   • warfarin (COUMADIN) 5 MG tablet Take 1  tab by mouth every other day with 4mg tab, alternating days with 10mg 45 tablet 3   • FLUARIX QUADRIVALENT 0.5 ML suspension prefilled syringe injection ADM 0.5ML IM UTD  0   • warfarin (COUMADIN) 1 MG tablet Take  by mouth.       No facility-administered medications prior to visit.        Patient Active Problem List   Diagnosis   • Benign essential hypertension   • Chronic atrial fibrillation   • Allergic rhinitis   • Primary insomnia   • Abnormal CT scan, lung   • Overweight (BMI 25.0-29.9)   • Other fatigue   • Varicose veins of lower extremity   • Bilateral hearing loss   • Allergy to mold   • Allergy to wheat   • Vitamin D deficiency   • Mixed hyperlipidemia   • Macrocytic anemia   • Anticoagulated on Coumadin   • Erectile dysfunction   • Acute bacterial conjunctivitis of right eye   • Insomnia disorder related to known organic factor   • Benign prostatic hyperplasia without urinary obstruction   • Cobalamin deficiency   • Spongiotic dermatitis   • Adverse effect of anticoagulant       Advance Care Planning:  Patient has an advance directive - a copy has not been provided. Have asked the patient to send this to us to add to record    Identification of Risk Factors:  Risk factors include: Obesity/Overweight .    Review of Systems   Constitutional: Negative for chills, fatigue and fever.   HENT: Negative for congestion, ear pain and sinus pressure.    Respiratory: Negative for cough, chest tightness, shortness of breath and wheezing.    Cardiovascular: Negative for chest pain and palpitations.   Gastrointestinal: Negative for abdominal pain, blood in stool and constipation.   Skin: Negative for color change.   Allergic/Immunologic: Negative for environmental allergies.   Neurological: Negative for dizziness, speech difficulty and headaches.   Psychiatric/Behavioral: Negative for confusion. The patient is not nervous/anxious.        Compared to one year ago, the patient feels his physical health is the  "same.  Compared to one year ago, the patient feels his mental health is the same.    Objective     Physical Exam     Procedures     Vitals:    01/09/20 1001 01/09/20 1051   BP: 120/96 120/80   BP Location: Left arm    Patient Position: Sitting    Cuff Size: Adult    Pulse: (!) 48  Comment: irregular 60   Temp: 97.6 °F (36.4 °C)    TempSrc: Temporal    Weight: 103 kg (228 lb)    Height: 186 cm (73.23\")    PainSc: 0-No pain        Patient's Body mass index is 29.89 kg/m². BMI is above normal parameters. Recommendations include: nutrition counseling.      Assessment/Plan   Problem List Items Addressed This Visit        Cardiovascular and Mediastinum    Mixed hyperlipidemia - Primary (Chronic)    Relevant Orders    Lipid Panel    Benign essential hypertension    Relevant Medications    doxazosin (CARDURA) 4 MG tablet    Other Relevant Orders    CBC & Differential    Comprehensive Metabolic Panel    MicroAlbumin, Urine, Random - Urine, Clean Catch    Chronic atrial fibrillation    Overview     chronic coumadin         Relevant Orders    Protime-INR       Digestive    Vitamin D deficiency (Chronic)    Relevant Orders    Vitamin D 25 Hydroxy      Other Visit Diagnoses     B12 deficiency        Relevant Orders    Vitamin B12    Prostate cancer (CMS/HCC)        Acute gout involving toe, unspecified cause, unspecified laterality        Relevant Orders    Uric Acid    Prostate cancer screening        Relevant Orders    PSA Screen        Patient Self-Management and Personalized Health Advice  The patient has been provided with information about: weight management and preventive services including:   · Annual Wellness Visit (AWV).    Outpatient Encounter Medications as of 1/9/2020   Medication Sig Dispense Refill   • allopurinol (ZYLOPRIM) 100 MG tablet Take 1 tablet by mouth Daily. 90 tablet 1   • Cholecalciferol (VITAMIN D3) 2000 units tablet Take 1 tablet by mouth Daily.     • doxazosin (CARDURA) 4 MG tablet Take 1 tablet by " mouth Every Night. 90 tablet 1   • doxycycline (VIBRAMYICN) 100 MG tablet      • fexofenadine (ALLEGRA) 180 MG tablet Take 180 mg by mouth Daily.     • folic acid (FOLVITE) 1 MG tablet Take 1 tablet by mouth Daily.     • vitamin B-12 (CYANOCOBALAMIN) 2500 MCG sublingual tablet tablet Place 1 tablet under the tongue Every Other Day.     • warfarin (COUMADIN) 10 MG tablet Take 1 tablet by mouth every other day, alternating days with 4mg and 5mg tabs (9mg total) 45 tablet 3   • warfarin (COUMADIN) 4 MG tablet Take 1 tablet by mouth every other day with 5mg tab, alternating days with 10mg 45 tablet 3   • warfarin (COUMADIN) 5 MG tablet Take 1 tab by mouth every other day with 4mg tab, alternating days with 10mg 45 tablet 3   • [DISCONTINUED] FLUARIX QUADRIVALENT 0.5 ML suspension prefilled syringe injection ADM 0.5ML IM UTD  0   • [DISCONTINUED] warfarin (COUMADIN) 1 MG tablet Take  by mouth.       No facility-administered encounter medications on file as of 2020.        Reviewed use of high risk medication in the elderly: yes  Reviewed for potential of harmful drug interactions in the elderly: yes    Follow Up:  Return for Annual physical.     Patient Instructions       Medicare Wellness  Personal Prevention Plan of Service     Date of Office Visit:  2020  Encounter Provider:  Cyndee Ricks PA-C  Place of Service:  [facility]  Patient Name: Uriel Valverde  :  1945    As part of the Medicare Wellness portion of your visit today, we are providing you with this personalized preventive plan of services (PPPS). This plan is based upon recommendations of the [place] Preventive Services Task Force (USPSTF) and the Advisory Committee on Immunization Practices (ACIP).    This lists the preventive care services that should be considered, and provides dates of when you are due. Items listed as completed are up-to-date and do not require any further  intervention.    Health Maintenance   Topic Date Due   • ZOSTER VACCINE (2 of 3) 12/14/2018   • HEPATITIS C SCREENING  01/02/2019   • MEDICARE ANNUAL WELLNESS  01/02/2019   • AAA SCREEN (ONE-TIME)  01/02/2019   • COLONOSCOPY  01/02/2019   • INFLUENZA VACCINE  08/01/2019   • LIPID PANEL  01/17/2020   • TDAP/TD VACCINES (3 - Td) 09/10/2022   • Pneumococcal Vaccine Once at 65 Years Old  Completed       Orders Placed This Encounter   Procedures   • Uric Acid     Standing Status:   Future   • Vitamin B12     Standing Status:   Future   • Protime-INR     Standing Status:   Future   • Vitamin D 25 Hydroxy     Standing Status:   Future   • Lipid Panel     Standing Status:   Future   • Comprehensive Metabolic Panel     Standing Status:   Future   • PSA Screen     Standing Status:   Future   • MicroAlbumin, Urine, Random - Urine, Clean Catch     Standing Status:   Future   • CBC & Differential     Standing Status:   Future     Order Specific Question:   Manual Differential     Answer:   No       Return for Annual physical.      BMI for Adults    Body mass index (BMI) is a number that is calculated from a person's weight and height. BMI may help to estimate how much of a person's weight is composed of fat. BMI can help identify those who may be at higher risk for certain medical problems.  How is BMI used with adults?  BMI is used as a screening tool to identify possible weight problems. It is used to check whether a person is obese, overweight, healthy weight, or underweight.  How is BMI calculated?  BMI measures your weight and compares it to your height. This can be done either in English (U.S[place] or metric measurements. Note that charts are available to help you find your BMI quickly and easily without having to do these calculations yourself.  To calculate your BMI in English (U.S.) measurements, your health care provider will:  1. Measure your weight in pounds (lb).  2. Multiply the number of pounds by 703.  ? For example,  "for a person who weighs 180 lb, multiply that number by 703, which equals 126,540.  3. Measure your height in inches (in). Then multiply that number by itself to get a measurement called \"inches squared.\"  ? For example, for a person who is 70 in tall, the \"inches squared\" measurement is 70 in x 70 in, which equals 4900 inches squared.  4. Divide the total from Step 2 (number of lb x 703) by the total from Step 3 (inches squared): 126,540 ÷ 4900 = 25.8. This is your BMI.  To calculate your BMI in metric measurements, your health care provider will:  1. Measure your weight in kilograms (kg).  2. Measure your height in meters (m). Then multiply that number by itself to get a measurement called \"meters squared.\"  ? For example, for a person who is 1.75 m tall, the \"meters squared\" measurement is 1.75 m x 1.75 m, which is equal to 3.1 meters squared.  3. Divide the number of kilograms (your weight) by the meters squared number. In this example: 70 ÷ 3.1 = 22.6. This is your BMI.  How is BMI interpreted?  To interpret your results, your health care provider will use BMI charts to identify whether you are underweight, normal weight, overweight, or obese. The following guidelines will be used:  · Underweight: BMI less than 18.5.  · Normal weight: BMI between 18.5 and 24.9.  · Overweight: BMI between 25 and 29.9.  · Obese: BMI of 30 and above.  Please note:  · Weight includes both fat and muscle, so someone with a muscular build, such as an athlete, may have a BMI that is higher than 24.9. In cases like these, BMI is not an accurate measure of body fat.  · To determine if excess body fat is the cause of a BMI of 25 or higher, further assessments may need to be done by a health care provider.  · BMI is usually interpreted in the same way for men and women.  Why is BMI a useful tool?  BMI is useful in two ways:  · Identifying a weight problem that may be related to a medical condition, or that may increase the risk for " medical problems.  · Promoting lifestyle and diet changes in order to reach a healthy weight.  Summary  · Body mass index (BMI) is a number that is calculated from a person's weight and height.  · BMI may help to estimate how much of a person's weight is composed of fat. BMI can help identify those who may be at higher risk for certain medical problems.  · BMI can be measured using English measurements or metric measurements.  · To interpret your results, your health care provider will use BMI charts to identify whether you are underweight, normal weight, overweight, or obese.  This information is not intended to replace advice given to you by your health care provider. Make sure you discuss any questions you have with your health care provider.  Document Released: 08/29/2005 Document Revised: 10/31/2018 Document Reviewed: 10/31/2018  [employer] Interactive Patient Education © 2019 [employer]        An After Visit Summary and PPPS with all of these plans were given to the patient.

## 2020-01-09 NOTE — PATIENT INSTRUCTIONS
Medicare Wellness  Personal Prevention Plan of Service     Date of Office Visit:  2020  Encounter Provider:  Cyndee Ricks PA-C  Place of Service:  [facility]  Patient Name: Uriel Valverde  :  1945    As part of the Medicare Wellness portion of your visit today, we are providing you with this personalized preventive plan of services (PPPS). This plan is based upon recommendations of the [place] Preventive Services Task Force (USPSTF) and the Advisory Committee on Immunization Practices (ACIP).    This lists the preventive care services that should be considered, and provides dates of when you are due. Items listed as completed are up-to-date and do not require any further intervention.    Health Maintenance   Topic Date Due   • ZOSTER VACCINE (2 of 3) 2018   • HEPATITIS C SCREENING  2019   • MEDICARE ANNUAL WELLNESS  2019   • AAA SCREEN (ONE-TIME)  2019   • COLONOSCOPY  2019   • INFLUENZA VACCINE  2019   • LIPID PANEL  2020   • TDAP/TD VACCINES (3 - Td) 09/10/2022   • Pneumococcal Vaccine Once at 65 Years Old  Completed       Orders Placed This Encounter   Procedures   • Uric Acid     Standing Status:   Future   • Vitamin B12     Standing Status:   Future   • Protime-INR     Standing Status:   Future   • Vitamin D 25 Hydroxy     Standing Status:   Future   • Lipid Panel     Standing Status:   Future   • Comprehensive Metabolic Panel     Standing Status:   Future   • PSA Screen     Standing Status:   Future   • MicroAlbumin, Urine, Random - Urine, Clean Catch     Standing Status:   Future   • CBC & Differential     Standing Status:   Future     Order Specific Question:   Manual Differential     Answer:   No       Return for Annual physical.      BMI for Adults    Body mass index (BMI) is a number that is calculated from a person's weight and height. BMI may help to estimate how much of a person's weight is  "composed of fat. BMI can help identify those who may be at higher risk for certain medical problems.  How is BMI used with adults?  BMI is used as a screening tool to identify possible weight problems. It is used to check whether a person is obese, overweight, healthy weight, or underweight.  How is BMI calculated?  BMI measures your weight and compares it to your height. This can be done either in English (U.S.) or metric measurements. Note that charts are available to help you find your BMI quickly and easily without having to do these calculations yourself.  To calculate your BMI in English (U.S.) measurements, your health care provider will:  1. Measure your weight in pounds (lb).  2. Multiply the number of pounds by 703.  ? For example, for a person who weighs 180 lb, multiply that number by 703, which equals 126,540.  3. Measure your height in inches (in). Then multiply that number by itself to get a measurement called \"inches squared.\"  ? For example, for a person who is 70 in tall, the \"inches squared\" measurement is 70 in x 70 in, which equals 4900 inches squared.  4. Divide the total from Step 2 (number of lb x 703) by the total from Step 3 (inches squared): 126,540 ÷ 4900 = 25.8. This is your BMI.  To calculate your BMI in metric measurements, your health care provider will:  1. Measure your weight in kilograms (kg).  2. Measure your height in meters (m). Then multiply that number by itself to get a measurement called \"meters squared.\"  ? For example, for a person who is 1.75 m tall, the \"meters squared\" measurement is 1.75 m x 1.75 m, which is equal to 3.1 meters squared.  3. Divide the number of kilograms (your weight) by the meters squared number. In this example: 70 ÷ 3.1 = 22.6. This is your BMI.  How is BMI interpreted?  To interpret your results, your health care provider will use BMI charts to identify whether you are underweight, normal weight, overweight, or obese. The following guidelines will " be used:  · Underweight: BMI less than 18.5.  · Normal weight: BMI between 18.5 and 24.9.  · Overweight: BMI between 25 and 29.9.  · Obese: BMI of 30 and above.  Please note:  · Weight includes both fat and muscle, so someone with a muscular build, such as an athlete, may have a BMI that is higher than 24.9. In cases like these, BMI is not an accurate measure of body fat.  · To determine if excess body fat is the cause of a BMI of 25 or higher, further assessments may need to be done by a health care provider.  · BMI is usually interpreted in the same way for men and women.  Why is BMI a useful tool?  BMI is useful in two ways:  · Identifying a weight problem that may be related to a medical condition, or that may increase the risk for medical problems.  · Promoting lifestyle and diet changes in order to reach a healthy weight.  Summary  · Body mass index (BMI) is a number that is calculated from a person's weight and height.  · BMI may help to estimate how much of a person's weight is composed of fat. BMI can help identify those who may be at higher risk for certain medical problems.  · BMI can be measured using English measurements or metric measurements.  · To interpret your results, your health care provider will use BMI charts to identify whether you are underweight, normal weight, overweight, or obese.  This information is not intended to replace advice given to you by your health care provider. Make sure you discuss any questions you have with your health care provider.  Document Released: 08/29/2005 Document Revised: 10/31/2018 Document Reviewed: 10/31/2018  [employer] Interactive Patient Education © 2019 [employer]

## 2020-01-10 ENCOUNTER — LAB (OUTPATIENT)
Dept: LAB | Facility: HOSPITAL | Age: 75
End: 2020-01-10

## 2020-01-10 DIAGNOSIS — E55.9 VITAMIN D DEFICIENCY: ICD-10-CM

## 2020-01-10 DIAGNOSIS — M10.9 ACUTE GOUT INVOLVING TOE, UNSPECIFIED CAUSE, UNSPECIFIED LATERALITY: ICD-10-CM

## 2020-01-10 DIAGNOSIS — I10 BENIGN ESSENTIAL HYPERTENSION: ICD-10-CM

## 2020-01-10 DIAGNOSIS — I48.20 CHRONIC ATRIAL FIBRILLATION (HCC): ICD-10-CM

## 2020-01-10 DIAGNOSIS — E78.2 MIXED HYPERLIPIDEMIA: ICD-10-CM

## 2020-01-10 DIAGNOSIS — Z12.5 PROSTATE CANCER SCREENING: ICD-10-CM

## 2020-01-10 DIAGNOSIS — E53.8 B12 DEFICIENCY: ICD-10-CM

## 2020-01-10 LAB
25(OH)D3 SERPL-MCNC: 53 NG/ML (ref 30–100)
ALBUMIN SERPL-MCNC: 4.2 G/DL (ref 3.5–5.2)
ALBUMIN UR-MCNC: <1.2 MG/DL
ALBUMIN/GLOB SERPL: 1.6 G/DL
ALP SERPL-CCNC: 65 U/L (ref 39–117)
ALT SERPL W P-5'-P-CCNC: 16 U/L (ref 1–41)
ANION GAP SERPL CALCULATED.3IONS-SCNC: 11.9 MMOL/L (ref 5–15)
AST SERPL-CCNC: 16 U/L (ref 1–40)
BILIRUB SERPL-MCNC: 0.4 MG/DL (ref 0.2–1.2)
BUN BLD-MCNC: 23 MG/DL (ref 8–23)
BUN/CREAT SERPL: 21.5 (ref 7–25)
BURR CELLS BLD QL SMEAR: ABNORMAL
CALCIUM SPEC-SCNC: 9.4 MG/DL (ref 8.6–10.5)
CHLORIDE SERPL-SCNC: 101 MMOL/L (ref 98–107)
CHOLEST SERPL-MCNC: 220 MG/DL (ref 0–200)
CO2 SERPL-SCNC: 27.1 MMOL/L (ref 22–29)
CREAT BLD-MCNC: 1.07 MG/DL (ref 0.76–1.27)
DEPRECATED RDW RBC AUTO: 45.5 FL (ref 37–54)
EOSINOPHIL # BLD MANUAL: 0.28 10*3/MM3 (ref 0–0.4)
EOSINOPHIL NFR BLD MANUAL: 4 % (ref 0.3–6.2)
ERYTHROCYTE [DISTWIDTH] IN BLOOD BY AUTOMATED COUNT: 13 % (ref 12.3–15.4)
GFR SERPL CREATININE-BSD FRML MDRD: 68 ML/MIN/1.73
GLOBULIN UR ELPH-MCNC: 2.7 GM/DL
GLUCOSE BLD-MCNC: 113 MG/DL (ref 65–99)
HCT VFR BLD AUTO: 43.1 % (ref 37.5–51)
HDLC SERPL-MCNC: 66 MG/DL (ref 40–60)
HGB BLD-MCNC: 14.7 G/DL (ref 13–17.7)
INR PPP: 2.37 (ref 0.85–1.16)
LDLC SERPL CALC-MCNC: 141 MG/DL (ref 0–100)
LDLC/HDLC SERPL: 2.14 {RATIO}
LYMPHOCYTES # BLD MANUAL: 4.07 10*3/MM3 (ref 0.7–3.1)
LYMPHOCYTES NFR BLD MANUAL: 5 % (ref 5–12)
LYMPHOCYTES NFR BLD MANUAL: 58.4 % (ref 19.6–45.3)
MCH RBC QN AUTO: 32.9 PG (ref 26.6–33)
MCHC RBC AUTO-ENTMCNC: 34.1 G/DL (ref 31.5–35.7)
MCV RBC AUTO: 96.4 FL (ref 79–97)
MONOCYTES # BLD AUTO: 0.35 10*3/MM3 (ref 0.1–0.9)
NEUTROPHILS # BLD AUTO: 2.28 10*3/MM3 (ref 1.7–7)
NEUTROPHILS NFR BLD MANUAL: 32.7 % (ref 42.7–76)
OVALOCYTES BLD QL SMEAR: ABNORMAL
PLAT MORPH BLD: NORMAL
PLATELET # BLD AUTO: 187 10*3/MM3 (ref 140–450)
PMV BLD AUTO: 10.8 FL (ref 6–12)
POIKILOCYTOSIS BLD QL SMEAR: ABNORMAL
POTASSIUM BLD-SCNC: 4.6 MMOL/L (ref 3.5–5.2)
PROT SERPL-MCNC: 6.9 G/DL (ref 6–8.5)
PROTHROMBIN TIME: 24.9 SECONDS (ref 11.2–14.3)
PSA SERPL-MCNC: 2.4 NG/ML (ref 0–4)
RBC # BLD AUTO: 4.47 10*6/MM3 (ref 4.14–5.8)
SMUDGE CELLS BLD QL SMEAR: ABNORMAL
SODIUM BLD-SCNC: 140 MMOL/L (ref 136–145)
TRIGL SERPL-MCNC: 63 MG/DL (ref 0–150)
URATE SERPL-MCNC: 5.9 MG/DL (ref 3.4–7)
VIT B12 BLD-MCNC: 1435 PG/ML (ref 211–946)
VLDLC SERPL-MCNC: 12.6 MG/DL (ref 5–40)
WBC NRBC COR # BLD: 6.97 10*3/MM3 (ref 3.4–10.8)

## 2020-01-10 PROCEDURE — 85025 COMPLETE CBC W/AUTO DIFF WBC: CPT

## 2020-01-10 PROCEDURE — G0103 PSA SCREENING: HCPCS

## 2020-01-10 PROCEDURE — 85610 PROTHROMBIN TIME: CPT

## 2020-01-10 PROCEDURE — 82043 UR ALBUMIN QUANTITATIVE: CPT

## 2020-01-10 PROCEDURE — 80053 COMPREHEN METABOLIC PANEL: CPT

## 2020-01-10 PROCEDURE — 84550 ASSAY OF BLOOD/URIC ACID: CPT

## 2020-01-10 PROCEDURE — 80061 LIPID PANEL: CPT

## 2020-01-10 PROCEDURE — 82607 VITAMIN B-12: CPT

## 2020-01-10 PROCEDURE — 85007 BL SMEAR W/DIFF WBC COUNT: CPT

## 2020-01-10 PROCEDURE — 82306 VITAMIN D 25 HYDROXY: CPT

## 2020-01-16 ENCOUNTER — OFFICE VISIT (OUTPATIENT)
Dept: INTERNAL MEDICINE | Facility: CLINIC | Age: 75
End: 2020-01-16

## 2020-01-16 VITALS
SYSTOLIC BLOOD PRESSURE: 140 MMHG | HEART RATE: 66 BPM | DIASTOLIC BLOOD PRESSURE: 80 MMHG | WEIGHT: 226 LBS | HEIGHT: 73 IN | BODY MASS INDEX: 29.95 KG/M2

## 2020-01-16 DIAGNOSIS — E78.2 MIXED HYPERLIPIDEMIA: Primary | Chronic | ICD-10-CM

## 2020-01-16 DIAGNOSIS — M25.511 CHRONIC RIGHT SHOULDER PAIN: Chronic | ICD-10-CM

## 2020-01-16 DIAGNOSIS — Z23 NEED FOR VACCINATION: ICD-10-CM

## 2020-01-16 DIAGNOSIS — E55.9 VITAMIN D DEFICIENCY: Chronic | ICD-10-CM

## 2020-01-16 DIAGNOSIS — Z79.01 ANTICOAGULATED ON COUMADIN: ICD-10-CM

## 2020-01-16 DIAGNOSIS — H90.3 SENSORINEURAL HEARING LOSS (SNHL) OF BOTH EARS: ICD-10-CM

## 2020-01-16 DIAGNOSIS — I10 BENIGN ESSENTIAL HYPERTENSION: ICD-10-CM

## 2020-01-16 DIAGNOSIS — E66.3 OVERWEIGHT (BMI 25.0-29.9): ICD-10-CM

## 2020-01-16 DIAGNOSIS — G89.29 CHRONIC RIGHT SHOULDER PAIN: Chronic | ICD-10-CM

## 2020-01-16 DIAGNOSIS — I48.20 CHRONIC ATRIAL FIBRILLATION (HCC): ICD-10-CM

## 2020-01-16 PROBLEM — R91.8 ABNORMAL CT SCAN, LUNG: Status: RESOLVED | Noted: 2017-02-09 | Resolved: 2020-01-16

## 2020-01-16 PROCEDURE — 99214 OFFICE O/P EST MOD 30 MIN: CPT | Performed by: INTERNAL MEDICINE

## 2020-01-16 PROCEDURE — 93000 ELECTROCARDIOGRAM COMPLETE: CPT | Performed by: INTERNAL MEDICINE

## 2020-01-16 RX ORDER — DOXAZOSIN MESYLATE 4 MG/1
4 TABLET ORAL NIGHTLY
Qty: 90 TABLET | Refills: 1 | Status: SHIPPED | OUTPATIENT
Start: 2020-01-16 | End: 2020-07-16 | Stop reason: SDUPTHER

## 2020-01-16 NOTE — PATIENT INSTRUCTIONS

## 2020-01-16 NOTE — PROGRESS NOTES
[facility]     Uriel Valverde  1945   [id number]      Patient Care Team:  Barbi Carlson MD as PCP - General (Internal Medicine)    Chief Complaint   Patient presents with   • Atrial Fibrillation     f/u   • Hypertension   • Hyperlipidemia            HPI  Patient is a 74 y.o. male presents with follow-up hypertension, hyperlipidemia, atrial fibrillation      CHRONIC CONDITIONS  For blood pressures which are usually controlled, taking doxazosin every evening.    Usually eats a low-fat diet.  He exercises and walks almost daily.    For atrial fibrillation, he takes warfarin.  He gets the PT/INR regularly.  He does not have any palpitations, fast heartbeat, chest pain, shortness of breath.  No edema.    He takes vitamin D regularly.    Chronic right shoulder pain for years, stable.  It does not limit his usual activities.    Past Medical History:   Diagnosis Date   • A-fib (CMS/Formerly [facility]) 02/11/2004    chronic coumadin; failed cardioversion; normal echo and exercise nuclear stress test.  He opted not to have ablation.   • A-fib (CMS/Formerly [facility])     failed cardioversion, normal echo and exercise nuclear stress test.  He opted not to have ablation   • Abnormal CT scan, lung 2/9/2017    RLL opacity with air bronchograms    • Allergic Soy    Wheat   • Allergic rhinitis    • Allergy to wheat     as well as soybean   • Anemia    • BPH (benign prostatic hyperplasia)    • Cobalamin deficiency 11/15/2016   • Cough 2015    sec/to inflammatory pnuemonitis   • Elbow effusion, left 09/2015    presumed sec/to gout   • Gout 2015    right ankle and righ knee (also of right hand-remote); colchicine prn.Uric acid lowering with allopurinol   • H/O eye surgery 2003   • Hayfever     as child; resolved when family moved to [place] from [place]   • Hypertension    • 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 Rod Whitten   • Macrocytic anemia 2016   • Medial meniscus tear 2003    right; arthroscopy knee   • Sensitivity to sunlight     sun sensitivity rash; sun avoidance, for many years   • Shoulder fracture, right     childhood   • Spongiotic dermatitis    • Tonsillitis     childhood; Tonsillectomy   • Vitamin B12 deficiency        Past Surgical History:   Procedure Laterality Date   • COLONOSCOPY     • HERNIA REPAIR     • KNEE ARTHROSCOPY Right     due to meniscus tear   • TONSILLECTOMY      as a child   • VASECTOMY         Family History   Problem Relation Age of Onset   • Heart failure Father    • Coronary artery disease Father         COD   • Stroke Sister 63   • Cancer Sister         Lung Cancer       Social History     Socioeconomic History   • Marital status:      Spouse name: Not on file   • Number of children: Not on file   • Years of education: Not on file   • Highest education level: Not on file   Tobacco Use   • Smoking status: Former Smoker     Packs/day: 0.50     Years: 22.00     Pack years: 11.00     Types: Cigarettes     Start date: 1966     Last attempt to quit: 1978     Years since quittin.0   • Smokeless tobacco: Never Used   • Tobacco comment: no passive smoke exposure, quit 79   Substance and Sexual Activity   • Alcohol use: Yes     Alcohol/week: 2.0 standard drinks     Types: 2 Glasses of wine per week     Comment: daily   • Drug use: No   • Sexual activity: Never       Allergies   Allergen Reactions   • Other Rash     Soy, mold, and wheat       Review of Systems:     Review of Systems   Constitutional: Negative for chills, fatigue and fever.   HENT: Negative for sinus pressure and sore throat.    Eyes: Negative for visual disturbance.   Respiratory: Negative for cough, shortness of breath and  "wheezing.    Cardiovascular: Negative for chest pain, palpitations and leg swelling.   Gastrointestinal: Negative for abdominal pain, blood in stool, constipation and diarrhea.   Endocrine: Negative for cold intolerance and heat intolerance.   Genitourinary: Positive for nocturia. Negative for dysuria and frequency.   Musculoskeletal: Negative for arthralgias and gait problem.   Skin: Negative for rash and skin lesions.   Allergic/Immunologic: Positive for environmental allergies.   Neurological: Negative for dizziness and memory problem.   Hematological: Negative for adenopathy. Does not bruise/bleed easily.   Psychiatric/Behavioral: Negative for suicidal ideas and depressed mood. The patient is not nervous/anxious.        Vital Signs  Vitals:    01/16/20 0931   BP: 140/80   BP Location: Left arm   Patient Position: Sitting   Cuff Size: Adult   Pulse: 66   Weight: 103 kg (226 lb)   Height: 186 cm (73.23\")   PainSc: 0-No pain     Body mass index is 29.63 kg/m².      Current Outpatient Medications:   •  allopurinol (ZYLOPRIM) 100 MG tablet, Take 1 tablet by mouth Daily., Disp: 90 tablet, Rfl: 1  •  Cholecalciferol (VITAMIN D3) 2000 units tablet, Take 1 tablet by mouth Daily., Disp: , Rfl:   •  doxazosin (CARDURA) 4 MG tablet, Take 1 tablet by mouth Every Night., Disp: 90 tablet, Rfl: 1  •  fexofenadine (ALLEGRA) 180 MG tablet, Take 180 mg by mouth Daily., Disp: , Rfl:   •  folic acid (FOLVITE) 1 MG tablet, Take 1 tablet by mouth Daily., Disp: , Rfl:   •  warfarin (COUMADIN) 10 MG tablet, Take 1 tablet by mouth every other day, alternating days with 4mg and 5mg tabs (9mg total), Disp: 45 tablet, Rfl: 3  •  warfarin (COUMADIN) 4 MG tablet, Take 1 tablet by mouth every other day with 5mg tab, alternating days with 10mg, Disp: 45 tablet, Rfl: 3  •  warfarin (COUMADIN) 5 MG tablet, Take 1 tab by mouth every other day with 4mg tab, alternating days with 10mg, Disp: 45 tablet, Rfl: 3      Physical Exam:    Physical Exam "   Constitutional: He is oriented to person, place, and time. He appears well-developed and well-nourished. He appears overweight.   Eyes: Pupils are equal, round, and reactive to light. Conjunctivae and EOM are normal.   Neck: Normal range of motion. Neck supple. No thyromegaly present.   Cardiovascular: Normal rate, regular rhythm, normal heart sounds and intact distal pulses.   No murmur heard.  Pulmonary/Chest: Effort normal and breath sounds normal. He has no wheezes.   Abdominal: Soft. Bowel sounds are normal. He exhibits no distension and no mass. There is no tenderness.   Musculoskeletal: Normal range of motion. He exhibits no edema or tenderness.   Lymphadenopathy:     He has no cervical adenopathy.   Neurological: He is alert and oriented to person, place, and time. He has normal strength. No cranial nerve deficit or sensory deficit. Coordination and gait normal.   Skin: Skin is warm and dry. No rash noted.   Psychiatric: He has a normal mood and affect. His speech is normal and behavior is normal. Judgment and thought content normal. Cognition and memory are normal.   Nursing note and vitals reviewed.           ECG 12 Lead  Date/Time: 1/16/2020 10:04 AM  Performed by: Barbi Carlson MD  Authorized by: Barbi Carlson MD   Comparison: compared with previous ECG   Similar to previous ECG  Rhythm: atrial fibrillation  Rate: normal  BPM: 51  Conduction: conduction normal  ST Segments: ST segments normal  T Waves: T waves normal  QRS axis: normal  Other findings: low voltage    Clinical impression: abnormal EKG          ACE III MINI        Results Review:    I reviewed the patient's new clinical results.  We reviewed his recent labs in detail.    CMP:  Lab Results   Component Value Date    BUN 23 01/10/2020    CREATININE 1.07 01/10/2020    EGFRIFNONA 68 01/10/2020    BCR 21.5 01/10/2020     01/10/2020    K 4.6 01/10/2020    CO2 27.1 01/10/2020    CALCIUM 9.4 01/10/2020    ALBUMIN 4.20 01/10/2020     BILITOT 0.4 01/10/2020    ALKPHOS 65 01/10/2020    AST 16 01/10/2020    ALT 16 01/10/2020     HbA1c:  No results found for: HGBA1C  Microalbumin:  Lab Results   Component Value Date    MICROALBUR <1.2 01/10/2020     Lipid Panel  Lab Results   Component Value Date    CHOL 220 (H) 01/10/2020    TRIG 63 01/10/2020    HDL 66 (H) 01/10/2020     (H) 01/10/2020    AST 16 01/10/2020    ALT 16 01/10/2020       Medication Review: Medications reviewed and noted  Patient Instructions   Problem List Items Addressed This Visit        Cardiovascular and Mediastinum    Mixed hyperlipidemia - Primary (Chronic)    Overview     1/16/2020 Barbi Carlson MD    LDL is now 141.  Goal is less than 100.    Continue regular exercise.  Improve the low-fat low sugar diet.  Eat smaller portions at mealtime.         Benign essential hypertension    Overview     1/16/2020 Barbi Carlson MD    Continue doxazosin every evening.  Continue to avoid salt in the diet and avoid sodas.         Relevant Medications    doxazosin (CARDURA) 4 MG tablet    Chronic atrial fibrillation    Overview     chronic coumadin  1/16/2020 Barbi Carlson MD    Continue anticoagulation and regular PT/INR checks.              Digestive    Vitamin D deficiency (Chronic)    Overview     1/16/2020 Barbi Carlson MD    Continue current dose of vitamin D3.            Nervous and Auditory    Chronic right shoulder pain (Chronic)    Overview     1/16/2020 Barbi Carlson MD    May take Tylenol as needed.  Moist heat often helps as well.         Bilateral hearing loss    Overview     1/16/2020 Barbi Carlson MD    Wears hearing aids.            Hematopoietic and Hemostatic    Anticoagulated on Coumadin       Other    Overweight (BMI 25.0-29.9)    Overview     1/16/2020 Barbi Carlson MD    Continue regular exercise.  Improve low-fat and low sugar diet.           Other Visit Diagnoses     Need for vaccination        He will get Tdap and the second Shingrix  at his pharmacy.             Diagnosis Plan   1. Mixed hyperlipidemia     2. Benign essential hypertension     3. Chronic atrial fibrillation     4. Anticoagulated on Coumadin     5. Vitamin D deficiency     6. Chronic right shoulder pain     7. Overweight (BMI 25.0-29.9)     8. Sensorineural hearing loss (SNHL) of both ears     9. Need for vaccination      He will get Tdap and the second Shingrix at his pharmacy.       Patient Instructions   Heart-Healthy Eating Plan  Many factors influence your heart (coronary) health, including eating and exercise habits. Coronary risk increases with abnormal blood fat (lipid) levels. Heart-healthy meal planning includes limiting unhealthy fats, increasing healthy fats, and making other diet and lifestyle changes.  What is my plan?  Your health care provider may recommend that you:  · Limit your fat intake to _________% or less of your total calories each day.  · Limit your saturated fat intake to _________% or less of your total calories each day.  · Limit the amount of cholesterol in your diet to less than _________ mg per day.  What are tips for following this plan?  Cooking  Cook foods using methods other than frying. Baking, boiling, grilling, and broiling are all good options. Other ways to reduce fat include:  · Removing the skin from poultry.  · Removing all visible fats from meats.  · Steaming vegetables in water or broth.  Meal planning    · At meals, imagine dividing your plate into fourths:  ? Fill one-half of your plate with vegetables and green salads.  ? Fill one-fourth of your plate with whole grains.  ? Fill one-fourth of your plate with lean protein foods.  · Eat 4-5 servings of vegetables per day. One serving equals 1 cup raw or cooked vegetable, or 2 cups raw leafy greens.  · Eat 4-5 servings of fruit per day. One serving equals 1 medium whole fruit, ¼ cup dried fruit, ½ cup fresh, frozen, or canned fruit, or ½ cup 100% fruit juice.  · Eat more foods that  contain soluble fiber. Examples include apples, broccoli, carrots, beans, peas, and barley. Aim to get 25-30 g of fiber per day.  · Increase your consumption of legumes, nuts, and seeds to 4-5 servings per week. One serving of dried beans or legumes equals ½ cup cooked, 1 serving of nuts is ¼ cup, and 1 serving of seeds equals 1 tablespoon.  Fats  · Choose healthy fats more often. Choose monounsaturated and polyunsaturated fats, such as olive and canola oils, flaxseeds, walnuts, almonds, and seeds.  · Eat more omega-3 fats. Choose salmon, mackerel, sardines, tuna, flaxseed oil, and ground flaxseeds. Aim to eat fish at least 2 times each week.  · Check food labels carefully to identify foods with trans fats or high amounts of saturated fat.  · Limit saturated fats. These are found in animal products, such as meats, butter, and cream. Plant sources of saturated fats include palm oil, palm kernel oil, and coconut oil.  · Avoid foods with partially hydrogenated oils in them. These contain trans fats. Examples are stick margarine, some tub margarines, cookies, crackers, and other baked goods.  · Avoid fried foods.  General information  · Eat more home-cooked food and less restaurant, buffet, and fast food.  · Limit or avoid alcohol.  · Limit foods that are high in starch and sugar.  · Lose weight if you are overweight. Losing just 5-10% of your body weight can help your overall health and prevent diseases such as diabetes and heart disease.  · Monitor your salt (sodium) intake, especially if you have high blood pressure. Talk with your health care provider about your sodium intake.  · Try to incorporate more vegetarian meals weekly.  What foods can I eat?  Fruits  All fresh, canned (in natural juice), or frozen fruits.  Vegetables  Fresh or frozen vegetables (raw, steamed, roasted, or grilled). Green salads.  Grains  Most grains. Choose whole wheat and whole grains most of the time. Rice and pasta, including brown rice  and pastas made with whole wheat.  Meats and other proteins  Lean, well-trimmed beef, veal, pork, and lamb. Chicken and turkey without skin. All fish and shellfish. Wild duck, rabbit, pheasant, and venison. Egg whites or low-cholesterol egg substitutes. Dried beans, peas, lentils, and tofu. Seeds and most nuts.  Dairy  Low-fat or nonfat cheeses, including ricotta and mozzarella. Skim or 1% milk (liquid, powdered, or evaporated). Buttermilk made with low-fat milk. Nonfat or low-fat yogurt.  Fats and oils  Non-hydrogenated (trans-free) margarines. Vegetable oils, including soybean, sesame, sunflower, olive, peanut, safflower, corn, canola, and cottonseed. Salad dressings or mayonnaise made with a vegetable oil.  Beverages  Water (mineral or sparkling). Coffee and tea. Diet carbonated beverages.  Sweets and desserts  Sherbet, gelatin, and fruit ice. Small amounts of dark chocolate.  Limit all sweets and desserts.  Seasonings and condiments  All seasonings and condiments.  The items listed above may not be a complete list of foods and beverages you can eat. Contact a dietitian for more options.  What foods are not recommended?  Fruits  Canned fruit in heavy syrup. Fruit in cream or butter sauce. Fried fruit. Limit coconut.  Vegetables  Vegetables cooked in cheese, cream, or butter sauce. Fried vegetables.  Grains  Breads made with saturated or trans fats, oils, or whole milk. Croissants. Sweet rolls. Donuts. High-fat crackers, such as cheese crackers.  Meats and other proteins  Fatty meats, such as hot dogs, ribs, sausage, vargas, rib-eye roast or steak. High-fat deli meats, such as salami and bologna. Caviar. Domestic duck and goose. Organ meats, such as liver.  Dairy  Cream, sour cream, cream cheese, and creamed cottage cheese. Whole milk cheeses. Whole or 2% milk (liquid, evaporated, or condensed). Whole buttermilk. Cream sauce or high-fat cheese sauce. Whole-milk yogurt.  Fats and oils  Meat fat, or shortening. Cocoa  butter, hydrogenated oils, palm oil, coconut oil, palm kernel oil. Solid fats and shortenings, including vargas fat, salt pork, lard, and butter. Nondairy cream substitutes. Salad dressings with cheese or sour cream.  Beverages  Regular sodas and any drinks with added sugar.  Sweets and desserts  Frosting. Pudding. Cookies. Cakes. Pies. Milk chocolate or white chocolate. Buttered syrups. Full-fat ice cream or ice cream drinks.  The items listed above may not be a complete list of foods and beverages to avoid. Contact a dietitian for more information.  Summary  · Heart-healthy meal planning includes limiting unhealthy fats, increasing healthy fats, and making other diet and lifestyle changes.  · Lose weight if you are overweight. Losing just 5-10% of your body weight can help your overall health and prevent diseases such as diabetes and heart disease.  · Focus on eating a balance of foods, including fruits and vegetables, low-fat or nonfat dairy, lean protein, nuts and legumes, whole grains, and heart-healthy oils and fats.  This information is not intended to replace advice given to you by your health care provider. Make sure you discuss any questions you have with your health care provider.  Document Released: 09/26/2009 Document Revised: 01/25/2019 Document Reviewed: 01/25/2019  [employer] Patient Education © 2019 [employer]        Plan of care reviewed with patient at the conclusion of today's visit. Education was provided regarding diagnosis, management, and any prescribed or recommended OTC medications.Patient verbalizes understanding of and agreement with management plan.         Barbi Carlson MD

## 2020-01-17 ENCOUNTER — TELEPHONE (OUTPATIENT)
Dept: INTERNAL MEDICINE | Facility: CLINIC | Age: 75
End: 2020-01-17

## 2020-01-17 ENCOUNTER — PATIENT MESSAGE (OUTPATIENT)
Dept: INTERNAL MEDICINE | Facility: CLINIC | Age: 75
End: 2020-01-17

## 2020-01-17 NOTE — TELEPHONE ENCOUNTER
From: Uriel Valverde  To: Barbi Carlson MD  Sent: 1/17/2020 12:44 PM EST  Subject: Test Results Question    I went to [facility] this morning and received my tetanus booster shot.    They also conducted a longer search and found that I have had my two shingles vaccines - 5/21/2018 and 7/21/2018.    Thanks for updating your records.    Uriel Valverde

## 2020-01-17 NOTE — TELEPHONE ENCOUNTER
Pharmacy called and gave an updated on immunizations. Pt does have both shingles immunizations and got his TDap immunization this morning     Devika call back  [phone]

## 2020-02-13 ENCOUNTER — LAB (OUTPATIENT)
Dept: LAB | Facility: HOSPITAL | Age: 75
End: 2020-02-13

## 2020-02-13 DIAGNOSIS — I48.20 CHRONIC ATRIAL FIBRILLATION (HCC): ICD-10-CM

## 2020-02-13 DIAGNOSIS — Z79.01 ANTICOAGULATED ON COUMADIN: Primary | ICD-10-CM

## 2020-02-13 LAB
INR PPP: 3.02 (ref 0.85–1.16)
PROTHROMBIN TIME: 30.1 SECONDS (ref 11.2–14.3)

## 2020-02-13 PROCEDURE — 36415 COLL VENOUS BLD VENIPUNCTURE: CPT

## 2020-02-13 PROCEDURE — 85610 PROTHROMBIN TIME: CPT

## 2020-03-04 RX ORDER — OSELTAMIVIR PHOSPHATE 75 MG/1
75 CAPSULE ORAL DAILY
Qty: 10 CAPSULE | Refills: 0 | Status: SHIPPED | OUTPATIENT
Start: 2020-03-04 | End: 2020-07-16

## 2020-03-11 ENCOUNTER — LAB (OUTPATIENT)
Dept: LAB | Facility: HOSPITAL | Age: 75
End: 2020-03-11

## 2020-03-11 DIAGNOSIS — Z79.01 ANTICOAGULATED ON COUMADIN: ICD-10-CM

## 2020-03-11 LAB
INR PPP: 3.67 (ref 0.85–1.16)
PROTHROMBIN TIME: 36.2 SECONDS (ref 11.5–14)

## 2020-03-11 PROCEDURE — 36415 COLL VENOUS BLD VENIPUNCTURE: CPT

## 2020-03-11 PROCEDURE — 85610 PROTHROMBIN TIME: CPT

## 2020-03-26 ENCOUNTER — LAB (OUTPATIENT)
Dept: LAB | Facility: HOSPITAL | Age: 75
End: 2020-03-26

## 2020-03-26 DIAGNOSIS — Z79.01 ANTICOAGULATED ON COUMADIN: ICD-10-CM

## 2020-03-26 LAB
INR PPP: 2.75 (ref 0.85–1.16)
PROTHROMBIN TIME: 28.8 SECONDS (ref 11.5–14)

## 2020-03-26 PROCEDURE — 36415 COLL VENOUS BLD VENIPUNCTURE: CPT

## 2020-03-26 PROCEDURE — 85610 PROTHROMBIN TIME: CPT

## 2020-04-23 ENCOUNTER — LAB (OUTPATIENT)
Dept: LAB | Facility: HOSPITAL | Age: 75
End: 2020-04-23

## 2020-04-23 DIAGNOSIS — Z79.01 ANTICOAGULATED ON COUMADIN: ICD-10-CM

## 2020-04-23 LAB
INR PPP: 4.44 (ref 0.85–1.16)
PROTHROMBIN TIME: 42.1 SECONDS (ref 11.5–14)

## 2020-04-23 PROCEDURE — 36415 COLL VENOUS BLD VENIPUNCTURE: CPT

## 2020-04-23 PROCEDURE — 85610 PROTHROMBIN TIME: CPT

## 2020-04-28 ENCOUNTER — TELEPHONE (OUTPATIENT)
Dept: INTERNAL MEDICINE | Facility: CLINIC | Age: 75
End: 2020-04-28

## 2020-04-28 NOTE — TELEPHONE ENCOUNTER
Regarding: Visit Follow-Up Question  Contact: [phone]  ----- Message from Pearl Loomis RN sent at 4/27/2020  4:18 PM EDT -----       ----- Message from Uriel Valverde to Barbi Carlson MD sent at 4/27/2020  3:58 PM -----   Attached for your records please find living villaseñor and medical authorizations for Uriel Valverde Jr. and Fernanda Valverde.    Thank you.

## 2020-04-28 NOTE — TELEPHONE ENCOUNTER
Patient sent as his and his wife's, Fernanda Valverde living villaseñor and their POA's as attachments in his my chart.  Please insert those in to the appropriate charts and update.

## 2020-05-07 ENCOUNTER — LAB (OUTPATIENT)
Dept: LAB | Facility: HOSPITAL | Age: 75
End: 2020-05-07

## 2020-05-07 DIAGNOSIS — Z79.01 ANTICOAGULATED ON COUMADIN: ICD-10-CM

## 2020-05-07 LAB
INR PPP: 2.58 (ref 0.85–1.16)
PROTHROMBIN TIME: 27.4 SECONDS (ref 11.5–14)

## 2020-05-07 PROCEDURE — 85610 PROTHROMBIN TIME: CPT

## 2020-05-07 PROCEDURE — 36415 COLL VENOUS BLD VENIPUNCTURE: CPT

## 2020-05-11 RX ORDER — WARFARIN SODIUM 5 MG/1
TABLET ORAL
Qty: 90 TABLET | Refills: 1 | Status: SHIPPED | OUTPATIENT
Start: 2020-05-11 | End: 2020-07-16 | Stop reason: SDUPTHER

## 2020-05-11 RX ORDER — WARFARIN SODIUM 10 MG/1
TABLET ORAL
Qty: 45 TABLET | Refills: 1 | Status: SHIPPED | OUTPATIENT
Start: 2020-05-11 | End: 2020-07-16 | Stop reason: SDUPTHER

## 2020-05-11 RX ORDER — ALLOPURINOL 100 MG/1
100 TABLET ORAL DAILY
Qty: 90 TABLET | Refills: 1 | Status: SHIPPED | OUTPATIENT
Start: 2020-05-11 | End: 2020-10-26 | Stop reason: SDUPTHER

## 2020-05-11 RX ORDER — WARFARIN SODIUM 4 MG/1
TABLET ORAL
Qty: 90 TABLET | Refills: 1 | Status: SHIPPED | OUTPATIENT
Start: 2020-05-11 | End: 2020-07-16 | Stop reason: SDUPTHER

## 2020-06-04 ENCOUNTER — LAB (OUTPATIENT)
Dept: LAB | Facility: HOSPITAL | Age: 75
End: 2020-06-04

## 2020-06-04 DIAGNOSIS — Z79.01 ANTICOAGULATED ON COUMADIN: ICD-10-CM

## 2020-06-04 LAB
INR PPP: 3.32 (ref 0.85–1.16)
PROTHROMBIN TIME: 33.4 SECONDS (ref 11.5–14)

## 2020-06-04 PROCEDURE — 85610 PROTHROMBIN TIME: CPT

## 2020-06-04 PROCEDURE — 36415 COLL VENOUS BLD VENIPUNCTURE: CPT

## 2020-07-02 ENCOUNTER — LAB (OUTPATIENT)
Dept: LAB | Facility: HOSPITAL | Age: 75
End: 2020-07-02

## 2020-07-02 DIAGNOSIS — Z79.01 ANTICOAGULATED ON COUMADIN: ICD-10-CM

## 2020-07-02 LAB
INR PPP: 2.56 (ref 0.85–1.16)
PROTHROMBIN TIME: 27.2 SECONDS (ref 11.5–14)

## 2020-07-02 PROCEDURE — 36415 COLL VENOUS BLD VENIPUNCTURE: CPT

## 2020-07-02 PROCEDURE — 85610 PROTHROMBIN TIME: CPT

## 2020-07-16 ENCOUNTER — LAB (OUTPATIENT)
Dept: LAB | Facility: HOSPITAL | Age: 75
End: 2020-07-16

## 2020-07-16 ENCOUNTER — OFFICE VISIT (OUTPATIENT)
Dept: INTERNAL MEDICINE | Facility: CLINIC | Age: 75
End: 2020-07-16

## 2020-07-16 VITALS
TEMPERATURE: 98.2 F | BODY MASS INDEX: 29.93 KG/M2 | HEIGHT: 73 IN | DIASTOLIC BLOOD PRESSURE: 63 MMHG | SYSTOLIC BLOOD PRESSURE: 152 MMHG | HEART RATE: 52 BPM | WEIGHT: 225.8 LBS

## 2020-07-16 DIAGNOSIS — E66.3 OVERWEIGHT WITH BODY MASS INDEX (BMI) OF 29 TO 29.9 IN ADULT: Chronic | ICD-10-CM

## 2020-07-16 DIAGNOSIS — E78.2 MIXED HYPERLIPIDEMIA: Chronic | ICD-10-CM

## 2020-07-16 DIAGNOSIS — I10 BENIGN ESSENTIAL HYPERTENSION: Primary | ICD-10-CM

## 2020-07-16 DIAGNOSIS — E55.9 VITAMIN D DEFICIENCY: Chronic | ICD-10-CM

## 2020-07-16 DIAGNOSIS — Z87.39 PERSONAL HISTORY OF GOUT: Chronic | ICD-10-CM

## 2020-07-16 DIAGNOSIS — Z79.01 ANTICOAGULATED ON COUMADIN: ICD-10-CM

## 2020-07-16 DIAGNOSIS — I48.20 CHRONIC ATRIAL FIBRILLATION (HCC): ICD-10-CM

## 2020-07-16 DIAGNOSIS — I10 BENIGN ESSENTIAL HYPERTENSION: ICD-10-CM

## 2020-07-16 DIAGNOSIS — I83.11 VARICOSE VEINS OF BOTH LOWER EXTREMITIES WITH INFLAMMATION: ICD-10-CM

## 2020-07-16 DIAGNOSIS — I83.12 VARICOSE VEINS OF BOTH LOWER EXTREMITIES WITH INFLAMMATION: ICD-10-CM

## 2020-07-16 LAB
25(OH)D3 SERPL-MCNC: 54.1 NG/ML (ref 30–100)
ALBUMIN SERPL-MCNC: 4.2 G/DL (ref 3.5–5.2)
ALBUMIN/GLOB SERPL: 1.8 G/DL
ALP SERPL-CCNC: 59 U/L (ref 39–117)
ALT SERPL W P-5'-P-CCNC: 20 U/L (ref 1–41)
ANION GAP SERPL CALCULATED.3IONS-SCNC: 9.8 MMOL/L (ref 5–15)
AST SERPL-CCNC: 23 U/L (ref 1–40)
BASOPHILS # BLD MANUAL: 0.07 10*3/MM3 (ref 0–0.2)
BASOPHILS NFR BLD AUTO: 1 % (ref 0–1.5)
BILIRUB SERPL-MCNC: 0.4 MG/DL (ref 0–1.2)
BILIRUB UR QL STRIP: NEGATIVE
BUN SERPL-MCNC: 24 MG/DL (ref 8–23)
BUN/CREAT SERPL: 21.8 (ref 7–25)
CALCIUM SPEC-SCNC: 9.4 MG/DL (ref 8.6–10.5)
CHLORIDE SERPL-SCNC: 104 MMOL/L (ref 98–107)
CHOLEST SERPL-MCNC: 189 MG/DL (ref 0–200)
CLARITY UR: CLEAR
CO2 SERPL-SCNC: 27.2 MMOL/L (ref 22–29)
COLOR UR: YELLOW
CREAT SERPL-MCNC: 1.1 MG/DL (ref 0.76–1.27)
DEPRECATED RDW RBC AUTO: 50.1 FL (ref 37–54)
ERYTHROCYTE [DISTWIDTH] IN BLOOD BY AUTOMATED COUNT: 14.1 % (ref 12.3–15.4)
GFR SERPL CREATININE-BSD FRML MDRD: 65 ML/MIN/1.73
GLOBULIN UR ELPH-MCNC: 2.3 GM/DL
GLUCOSE SERPL-MCNC: 109 MG/DL (ref 65–99)
GLUCOSE UR STRIP-MCNC: NEGATIVE MG/DL
HCT VFR BLD AUTO: 41.3 % (ref 37.5–51)
HDLC SERPL-MCNC: 74 MG/DL (ref 40–60)
HGB BLD-MCNC: 14.4 G/DL (ref 13–17.7)
HGB UR QL STRIP.AUTO: NEGATIVE
KETONES UR QL STRIP: NEGATIVE
LDLC SERPL CALC-MCNC: 108 MG/DL (ref 0–100)
LDLC/HDLC SERPL: 1.46 {RATIO}
LEUKOCYTE ESTERASE UR QL STRIP.AUTO: NEGATIVE
LYMPHOCYTES # BLD MANUAL: 3.64 10*3/MM3 (ref 0.7–3.1)
LYMPHOCYTES NFR BLD MANUAL: 11.1 % (ref 5–12)
LYMPHOCYTES NFR BLD MANUAL: 49.5 % (ref 19.6–45.3)
MCH RBC QN AUTO: 34 PG (ref 26.6–33)
MCHC RBC AUTO-ENTMCNC: 34.9 G/DL (ref 31.5–35.7)
MCV RBC AUTO: 97.4 FL (ref 79–97)
MONOCYTES # BLD AUTO: 0.82 10*3/MM3 (ref 0.1–0.9)
MYELOCYTES NFR BLD MANUAL: 1 % (ref 0–0)
NEUTROPHILS # BLD AUTO: 2.75 10*3/MM3 (ref 1.7–7)
NEUTROPHILS NFR BLD MANUAL: 37.4 % (ref 42.7–76)
NITRITE UR QL STRIP: NEGATIVE
PH UR STRIP.AUTO: <=5 [PH] (ref 5–8)
PLAT MORPH BLD: NORMAL
PLATELET # BLD AUTO: 145 10*3/MM3 (ref 140–450)
PMV BLD AUTO: 11.3 FL (ref 6–12)
POTASSIUM SERPL-SCNC: 4.5 MMOL/L (ref 3.5–5.2)
PROT SERPL-MCNC: 6.5 G/DL (ref 6–8.5)
PROT UR QL STRIP: NEGATIVE
RBC # BLD AUTO: 4.24 10*6/MM3 (ref 4.14–5.8)
RBC MORPH BLD: NORMAL
SMUDGE CELLS BLD QL SMEAR: ABNORMAL
SODIUM SERPL-SCNC: 141 MMOL/L (ref 136–145)
SP GR UR STRIP: 1.02 (ref 1–1.03)
TRIGL SERPL-MCNC: 34 MG/DL (ref 0–150)
TSH SERPL DL<=0.05 MIU/L-ACNC: 1.62 UIU/ML (ref 0.27–4.2)
UROBILINOGEN UR QL STRIP: NORMAL
VLDLC SERPL-MCNC: 6.8 MG/DL (ref 5–40)
WBC # BLD AUTO: 7.36 10*3/MM3 (ref 3.4–10.8)

## 2020-07-16 PROCEDURE — 84443 ASSAY THYROID STIM HORMONE: CPT

## 2020-07-16 PROCEDURE — 85007 BL SMEAR W/DIFF WBC COUNT: CPT

## 2020-07-16 PROCEDURE — 80061 LIPID PANEL: CPT

## 2020-07-16 PROCEDURE — 82043 UR ALBUMIN QUANTITATIVE: CPT

## 2020-07-16 PROCEDURE — 81003 URINALYSIS AUTO W/O SCOPE: CPT

## 2020-07-16 PROCEDURE — 85025 COMPLETE CBC W/AUTO DIFF WBC: CPT

## 2020-07-16 PROCEDURE — 82570 ASSAY OF URINE CREATININE: CPT

## 2020-07-16 PROCEDURE — 80053 COMPREHEN METABOLIC PANEL: CPT

## 2020-07-16 PROCEDURE — 99214 OFFICE O/P EST MOD 30 MIN: CPT | Performed by: INTERNAL MEDICINE

## 2020-07-16 PROCEDURE — 82306 VITAMIN D 25 HYDROXY: CPT

## 2020-07-16 RX ORDER — WARFARIN SODIUM 10 MG/1
TABLET ORAL
Qty: 45 TABLET | Refills: 1 | Status: SHIPPED | OUTPATIENT
Start: 2020-07-16 | End: 2021-03-18

## 2020-07-16 RX ORDER — DOXAZOSIN MESYLATE 4 MG/1
4 TABLET ORAL NIGHTLY
Qty: 90 TABLET | Refills: 1 | Status: SHIPPED | OUTPATIENT
Start: 2020-07-16 | End: 2021-01-20

## 2020-07-16 RX ORDER — WARFARIN SODIUM 5 MG/1
TABLET ORAL
Qty: 90 TABLET | Refills: 1 | Status: SHIPPED | OUTPATIENT
Start: 2020-07-16 | End: 2021-07-06

## 2020-07-16 RX ORDER — WARFARIN SODIUM 4 MG/1
TABLET ORAL
Qty: 90 TABLET | Refills: 1 | Status: SHIPPED | OUTPATIENT
Start: 2020-07-16 | End: 2021-07-06

## 2020-07-16 NOTE — PROGRESS NOTES
[facility]     Uriel Valverde  1945   [id number]      Patient Care Team:  Barbi Carlson MD as PCP - General (Internal Medicine)    Chief Complaint   Patient presents with   • Hyperlipidemia     fasting if needed   • Atrial Fibrillation            HPI  Patient is a 75 y.o. male presents with hyperlipidemia, A. fib, hypertension    CHRONIC CONDITIONS  Has not checked BP elsewhere.  Takes doxazosin nightly.  Usually avoids salt in the diet.    For hyperlipidemia, tries to eat a low-fat diet.  He is exercising and walking some.    Allopurinol none daily has prevented any gout attacks.  He has not had any for a few years.    For A. fib, he takes warfarin daily.  He gets the PT/INR done regularly.  He denies any palpitations or rapid heartbeat.    Swelling in the lower extremities is helped by wearing support socks daily.  He denies any pain with the veins.    Past Medical History:   Diagnosis Date   • A-fib (CMS/[facility]) 02/11/2004    chronic coumadin; failed cardioversion; normal echo and exercise nuclear stress test.  He opted not to have ablation.   • A-fib (CMS/[facility])     failed cardioversion, normal echo and exercise nuclear stress test.  He opted not to have ablation   • Abnormal CT scan, lung 2/9/2017    RLL opacity with air bronchograms    • Allergic Soy    Wheat   • Allergic rhinitis    • Allergy to wheat     as well as soybean   • Anemia    • BPH (benign prostatic hyperplasia)    • Cobalamin deficiency 11/15/2016   • Cough 2015    sec/to inflammatory pnuemonitis   • Elbow effusion, left 09/2015    presumed sec/to gout   • Gout 2015    right ankle and righ knee (also of right hand-remote); colchicine prn.Uric acid lowering with allopurinol   • H/O eye surgery 2003   • Hayfever     as child; resolved when family moved to [place] from [place]   • Hypertension    • 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 Rod Whitten   • Macrocytic anemia 2016   • Medial meniscus tear 2003    right; arthroscopy knee   • Sensitivity to sunlight     sun sensitivity rash; sun avoidance, for many years   • Shoulder fracture, right     childhood   • Spongiotic dermatitis    • Tonsillitis     childhood; Tonsillectomy   • Vitamin B12 deficiency        Past Surgical History:   Procedure Laterality Date   • COLONOSCOPY     • HERNIA REPAIR     • KNEE ARTHROSCOPY Right     due to meniscus tear   • TONSILLECTOMY      as a child   • VASECTOMY         Family History   Problem Relation Age of Onset   • Heart failure Father    • Coronary artery disease Father         COD   • Stroke Sister 63   • Cancer Sister         Lung Cancer       Social History     Socioeconomic History   • Marital status:      Spouse name: Not on file   • Number of children: Not on file   • Years of education: Not on file   • Highest education level: Not on file   Tobacco Use   • Smoking status: Former Smoker     Packs/day: 0.50     Years: 22.00     Pack years: 11.00     Types: Cigarettes     Start date: 1966     Last attempt to quit: 1978     Years since quittin.5   • Smokeless tobacco: Never Used   • Tobacco comment: no passive smoke exposure, quit 79   Substance and Sexual Activity   • Alcohol use: Yes     Alcohol/week: 2.0 standard drinks     Types: 2 Glasses of wine per week     Comment: daily   • Drug use: No   • Sexual activity: Never       Allergies   Allergen Reactions   • Other Rash     Soy, mold, and wheat       Review of Systems:     Review of Systems   Constitutional: Negative for chills, fatigue and fever.   HENT: Negative for congestion, ear pain and sinus pressure.    Respiratory: Negative for cough, chest tightness,  "shortness of breath and wheezing.    Cardiovascular: Positive for leg swelling. Negative for chest pain and palpitations.   Gastrointestinal: Negative for abdominal pain, blood in stool and constipation.   Genitourinary: Negative for dysuria and frequency.   Musculoskeletal: Negative for arthralgias and back pain.   Skin: Negative for color change.   Allergic/Immunologic: Negative for environmental allergies.   Neurological: Negative for dizziness, speech difficulty and headache.   Psychiatric/Behavioral: Negative for decreased concentration. The patient is not nervous/anxious.        Vital Signs  Vitals:    07/16/20 0955   BP: 152/63   BP Location: Left arm   Patient Position: Sitting   Cuff Size: Adult   Pulse: 52   Temp: 98.2 °F (36.8 °C)   TempSrc: Temporal   Weight: 102 kg (225 lb 12.8 oz)   Height: 186 cm (73.23\")   PainSc: 0-No pain     Body mass index is 29.61 kg/m².      Current Outpatient Medications:   •  allopurinol (ZYLOPRIM) 100 MG tablet, Take 1 tablet by mouth Daily., Disp: 90 tablet, Rfl: 1  •  Cholecalciferol (VITAMIN D3) 2000 units tablet, Take 1 tablet by mouth Daily., Disp: , Rfl:   •  doxazosin (CARDURA) 4 MG tablet, Take 1 tablet by mouth Every Night., Disp: 90 tablet, Rfl: 1  •  fexofenadine (ALLEGRA) 180 MG tablet, Take 180 mg by mouth Daily., Disp: , Rfl:   •  folic acid (FOLVITE) 1 MG tablet, Take 1 tablet by mouth Daily., Disp: , Rfl:   •  warfarin (COUMADIN) 10 MG tablet, Take 10mg daily 2 days/week and 9 mg daily 5 days/week, Disp: 45 tablet, Rfl: 1  •  warfarin (COUMADIN) 4 MG tablet, Take 10 mg daily 2 days/week and 9 mg daily 5 days/week, Disp: 90 tablet, Rfl: 1  •  warfarin (COUMADIN) 5 MG tablet, Take 10 mg daily 2 days/week and 9 mg daily 5 days/week, Disp: 90 tablet, Rfl: 1    Physical Exam:    Physical Exam   Constitutional: He is oriented to person, place, and time. He appears well-developed and well-nourished.   HENT:   Head: Normocephalic.   Eyes: Pupils are equal, round, and " reactive to light. Conjunctivae and EOM are normal.   Neck: Normal range of motion. Neck supple. No thyromegaly present.   Cardiovascular: Normal rate, normal heart sounds and intact distal pulses. An irregularly irregular rhythm present.   Mild nonpitting lower leg edema bilaterally.  Wearing support socks.   Pulmonary/Chest: Effort normal and breath sounds normal.   Musculoskeletal: Normal range of motion. He exhibits edema.   Lymphadenopathy:     He has no cervical adenopathy.   Neurological: He is alert and oriented to person, place, and time.   Psychiatric: He has a normal mood and affect. Thought content normal.   Nursing note and vitals reviewed.       ACE III MINI        Results Review:    None    CMP:  Lab Results   Component Value Date    BUN 23 01/10/2020    CREATININE 1.07 01/10/2020    EGFRIFNONA 68 01/10/2020    BCR 21.5 01/10/2020     01/10/2020    K 4.6 01/10/2020    CO2 27.1 01/10/2020    CALCIUM 9.4 01/10/2020    ALBUMIN 4.20 01/10/2020    BILITOT 0.4 01/10/2020    ALKPHOS 65 01/10/2020    AST 16 01/10/2020    ALT 16 01/10/2020     HbA1c:  No results found for: HGBA1C  Microalbumin:  Lab Results   Component Value Date    MICROALBUR <1.2 01/10/2020     Lipid Panel  Lab Results   Component Value Date    CHOL 220 (H) 01/10/2020    TRIG 63 01/10/2020    HDL 66 (H) 01/10/2020     (H) 01/10/2020    AST 16 01/10/2020    ALT 16 01/10/2020       Medication Review: Medications reviewed and noted  Patient Instructions   Problem List Items Addressed This Visit        Cardiovascular and Mediastinum    Mixed hyperlipidemia (Chronic)    Overview     7/16/2020 Barbi Carlson MD    LDL was 141 in January.  Goal is less than 100.    Continue regular exercise.  Continue to improve the low-fat low sugar diet.  Eat smaller portions at mealtime.         Relevant Orders    Lipid Panel    TSH    Benign essential hypertension - Primary    Overview     7/16/2020 Barbi Carlson MD    Continue doxazosin  every evening.  Continue to avoid salt in the diet and avoid sodas.    Let us recheck your blood pressure the next time you come for the pro time/INR.         Relevant Medications    doxazosin (CARDURA) 4 MG tablet    Other Relevant Orders    Comprehensive Metabolic Panel    CBC & Differential    Urinalysis With Culture If Indicated -    Microalbumin / Creatinine Urine Ratio - Urine, Clean Catch    Chronic atrial fibrillation    Overview     chronic coumadin  7/16/2020 Barbi Carlson MD    Continue anticoagulation and regular PT/INR checks.           Varicose veins of lower extremity    Overview     Varicose veins with edema    7/16/2020 Barbi Carlson MD     Continue wearing support socks daily.                Digestive    Vitamin D deficiency (Chronic)    Overview     7/16/2020 Barbi Carlson MD    Continue current dose of vitamin D3.         Relevant Orders    Vitamin D 25 Hydroxy       Hematopoietic and Hemostatic    Anticoagulated on Coumadin    Overview     7/16/2020 Barbi Carlson MD    Continue regular PT/INR.            Other    Personal history of gout (Chronic)    Overview     7/16/2020 Babri Carlson MD    Continue allopurinol daily.         Overweight with body mass index (BMI) of 29 to 29.9 in adult (Chronic)    Overview     7/16/2020 Barbi Carlson MD    Continue regular exercise and walking.  Continue low-fat low sugar diet.                  Diagnosis Plan   1. Benign essential hypertension  Comprehensive Metabolic Panel    CBC & Differential    Urinalysis With Culture If Indicated -    Microalbumin / Creatinine Urine Ratio - Urine, Clean Catch   2. Mixed hyperlipidemia  Lipid Panel    TSH   3. Chronic atrial fibrillation     4. Varicose veins of both lower extremities with inflammation     5. Vitamin D deficiency  Vitamin D 25 Hydroxy   6. Anticoagulated on Coumadin     7. Personal history of gout     8. Overweight with body mass index (BMI) of 29 to 29.9 in adult         Plan of  care reviewed with patient at the conclusion of today's visit. Education was provided regarding diagnosis, management, and any prescribed or recommended OTC medications.Patient verbalizes understanding of and agreement with management plan.         Barbi Carlson MD        Answers for HPI/ROS submitted by the patient on 7/9/2020   What is the primary reason for your visit?: Physical

## 2020-07-16 NOTE — PATIENT INSTRUCTIONS
Patient Instructions   Problem List Items Addressed This Visit        Cardiovascular and Mediastinum    Mixed hyperlipidemia (Chronic)    Overview     7/16/2020 Barbi Carlson MD    LDL was 141 in January.  Goal is less than 100.    Continue regular exercise.  Continue to improve the low-fat low sugar diet.  Eat smaller portions at mealtime.         Relevant Orders    Lipid Panel    TSH    Benign essential hypertension - Primary    Overview     7/16/2020 Barbi Carlson MD    Continue doxazosin every evening.  Continue to avoid salt in the diet and avoid sodas.    Let us recheck your blood pressure the next time you come for the pro time/INR.         Relevant Medications    doxazosin (CARDURA) 4 MG tablet    Other Relevant Orders    Comprehensive Metabolic Panel    CBC & Differential    Urinalysis With Culture If Indicated -    Microalbumin / Creatinine Urine Ratio - Urine, Clean Catch    Chronic atrial fibrillation    Overview     chronic coumadin  7/16/2020 Barbi Carlson MD    Continue anticoagulation and regular PT/INR checks.           Varicose veins of lower extremity    Overview     Varicose veins with edema    7/16/2020 Barbi Carlson MD     Continue wearing support socks daily.                Digestive    Vitamin D deficiency (Chronic)    Overview     7/16/2020 Barbi Carlson MD    Continue current dose of vitamin D3.         Relevant Orders    Vitamin D 25 Hydroxy       Hematopoietic and Hemostatic    Anticoagulated on Coumadin    Overview     7/16/2020 Barbi Carlson MD    Continue regular PT/INR.            Other    Personal history of gout (Chronic)    Overview     7/16/2020 Barbi Carlson MD    Continue allopurinol daily.         Overweight with body mass index (BMI) of 29 to 29.9 in adult (Chronic)    Overview     7/16/2020 Barbi Carlson MD    Continue regular exercise and walking.  Continue low-fat low sugar diet.

## 2020-07-17 LAB
ALBUMIN UR-MCNC: <1.2 MG/DL
CREAT UR-MCNC: 114.9 MG/DL
MICROALBUMIN/CREAT UR: NORMAL MG/G{CREAT}

## 2020-07-30 ENCOUNTER — LAB (OUTPATIENT)
Dept: LAB | Facility: HOSPITAL | Age: 75
End: 2020-07-30

## 2020-07-30 DIAGNOSIS — Z79.01 ANTICOAGULATED ON COUMADIN: ICD-10-CM

## 2020-07-30 LAB
INR PPP: 2.25 (ref 0.85–1.16)
PROTHROMBIN TIME: 24.5 SECONDS (ref 11.5–14)

## 2020-07-30 PROCEDURE — 85610 PROTHROMBIN TIME: CPT

## 2020-07-30 PROCEDURE — 36415 COLL VENOUS BLD VENIPUNCTURE: CPT

## 2020-08-28 ENCOUNTER — LAB (OUTPATIENT)
Dept: LAB | Facility: HOSPITAL | Age: 75
End: 2020-08-28

## 2020-08-28 DIAGNOSIS — Z79.01 ANTICOAGULATED ON COUMADIN: ICD-10-CM

## 2020-08-28 LAB
INR PPP: 2.43 (ref 0.85–1.16)
PROTHROMBIN TIME: 26.1 SECONDS (ref 11.5–14)

## 2020-08-28 PROCEDURE — 85610 PROTHROMBIN TIME: CPT

## 2020-08-28 PROCEDURE — 36415 COLL VENOUS BLD VENIPUNCTURE: CPT

## 2020-08-30 ENCOUNTER — DOCUMENTATION (OUTPATIENT)
Dept: INTERNAL MEDICINE | Facility: CLINIC | Age: 75
End: 2020-08-30

## 2020-09-24 ENCOUNTER — LAB (OUTPATIENT)
Dept: LAB | Facility: HOSPITAL | Age: 75
End: 2020-09-24

## 2020-09-24 DIAGNOSIS — Z79.01 ANTICOAGULATED ON COUMADIN: ICD-10-CM

## 2020-09-24 LAB
INR PPP: 2.56 (ref 0.85–1.16)
PROTHROMBIN TIME: 27.2 SECONDS (ref 11.5–14)

## 2020-09-24 PROCEDURE — 85610 PROTHROMBIN TIME: CPT

## 2020-09-24 PROCEDURE — 36415 COLL VENOUS BLD VENIPUNCTURE: CPT

## 2020-10-22 ENCOUNTER — LAB (OUTPATIENT)
Dept: LAB | Facility: HOSPITAL | Age: 75
End: 2020-10-22

## 2020-10-22 DIAGNOSIS — Z79.01 ANTICOAGULATED ON COUMADIN: ICD-10-CM

## 2020-10-22 LAB
INR PPP: 2.46 (ref 0.85–1.16)
PROTHROMBIN TIME: 26.4 SECONDS (ref 11.5–14)

## 2020-10-22 PROCEDURE — 36415 COLL VENOUS BLD VENIPUNCTURE: CPT

## 2020-10-22 PROCEDURE — 85610 PROTHROMBIN TIME: CPT

## 2020-10-23 ENCOUNTER — PATIENT MESSAGE (OUTPATIENT)
Dept: INTERNAL MEDICINE | Facility: CLINIC | Age: 75
End: 2020-10-23

## 2020-10-26 RX ORDER — ALLOPURINOL 100 MG/1
100 TABLET ORAL DAILY
Qty: 90 TABLET | Refills: 1 | Status: SHIPPED | OUTPATIENT
Start: 2020-10-26 | End: 2021-04-19 | Stop reason: SDUPTHER

## 2020-10-26 NOTE — TELEPHONE ENCOUNTER
From: Uriel Valverde  To: Barbi Carlson MD  Sent: 10/23/2020 10:35 PM EDT  Subject: Prescription Question    Need a refill of Allipurinol 100 mg table for 90 days.    Uriel Valverde

## 2020-11-19 ENCOUNTER — LAB (OUTPATIENT)
Dept: LAB | Facility: HOSPITAL | Age: 75
End: 2020-11-19

## 2020-11-19 DIAGNOSIS — Z51.81 ENCOUNTER FOR THERAPEUTIC DRUG MONITORING: Primary | ICD-10-CM

## 2020-11-19 DIAGNOSIS — Z79.01 ANTICOAGULATED ON COUMADIN: ICD-10-CM

## 2020-11-19 LAB
INR PPP: 2.38 (ref 0.85–1.16)
PROTHROMBIN TIME: 25.7 SECONDS (ref 11.5–14)

## 2020-11-19 PROCEDURE — 93793 ANTICOAG MGMT PT WARFARIN: CPT | Performed by: INTERNAL MEDICINE

## 2020-11-19 PROCEDURE — 36415 COLL VENOUS BLD VENIPUNCTURE: CPT

## 2020-11-19 PROCEDURE — 85610 PROTHROMBIN TIME: CPT

## 2020-12-17 ENCOUNTER — LAB (OUTPATIENT)
Dept: LAB | Facility: HOSPITAL | Age: 75
End: 2020-12-17

## 2020-12-17 DIAGNOSIS — Z79.01 ANTICOAGULATED ON COUMADIN: ICD-10-CM

## 2020-12-17 LAB
INR PPP: 2.37 (ref 0.85–1.16)
PROTHROMBIN TIME: 25.6 SECONDS (ref 11.5–14)

## 2020-12-17 PROCEDURE — 85610 PROTHROMBIN TIME: CPT

## 2020-12-17 PROCEDURE — 36415 COLL VENOUS BLD VENIPUNCTURE: CPT

## 2020-12-18 PROBLEM — Z51.81 ENCOUNTER FOR THERAPEUTIC DRUG MONITORING: Status: ACTIVE | Noted: 2020-12-18

## 2021-01-14 ENCOUNTER — LAB (OUTPATIENT)
Dept: LAB | Facility: HOSPITAL | Age: 76
End: 2021-01-14

## 2021-01-14 DIAGNOSIS — Z51.81 ENCOUNTER FOR THERAPEUTIC DRUG MONITORING: Primary | ICD-10-CM

## 2021-01-14 DIAGNOSIS — Z79.01 ANTICOAGULATED ON COUMADIN: ICD-10-CM

## 2021-01-14 DIAGNOSIS — Z79.01 ANTICOAGULATED ON WARFARIN: ICD-10-CM

## 2021-01-14 LAB
INR PPP: 2.56 (ref 0.85–1.16)
PROTHROMBIN TIME: 27.2 SECONDS (ref 11.5–14)

## 2021-01-14 PROCEDURE — 85610 PROTHROMBIN TIME: CPT

## 2021-01-14 PROCEDURE — 85610 PROTHROMBIN TIME: CPT | Performed by: INTERNAL MEDICINE

## 2021-01-14 PROCEDURE — 93793 ANTICOAG MGMT PT WARFARIN: CPT | Performed by: INTERNAL MEDICINE

## 2021-01-14 PROCEDURE — 36416 COLLJ CAPILLARY BLOOD SPEC: CPT | Performed by: INTERNAL MEDICINE

## 2021-01-18 ENCOUNTER — OFFICE VISIT (OUTPATIENT)
Dept: INTERNAL MEDICINE | Facility: CLINIC | Age: 76
End: 2021-01-18

## 2021-01-18 ENCOUNTER — LAB (OUTPATIENT)
Dept: LAB | Facility: HOSPITAL | Age: 76
End: 2021-01-18

## 2021-01-18 VITALS
WEIGHT: 228 LBS | DIASTOLIC BLOOD PRESSURE: 78 MMHG | TEMPERATURE: 96.9 F | HEART RATE: 58 BPM | HEIGHT: 73 IN | BODY MASS INDEX: 30.22 KG/M2 | SYSTOLIC BLOOD PRESSURE: 138 MMHG

## 2021-01-18 DIAGNOSIS — I10 BENIGN ESSENTIAL HYPERTENSION: ICD-10-CM

## 2021-01-18 DIAGNOSIS — N40.0 BENIGN PROSTATIC HYPERPLASIA WITHOUT URINARY OBSTRUCTION: ICD-10-CM

## 2021-01-18 DIAGNOSIS — I83.11 VARICOSE VEINS OF BOTH LOWER EXTREMITIES WITH INFLAMMATION: ICD-10-CM

## 2021-01-18 DIAGNOSIS — I48.20 CHRONIC ATRIAL FIBRILLATION (HCC): ICD-10-CM

## 2021-01-18 DIAGNOSIS — E78.2 MIXED HYPERLIPIDEMIA: Chronic | ICD-10-CM

## 2021-01-18 DIAGNOSIS — I10 BENIGN ESSENTIAL HYPERTENSION: Primary | ICD-10-CM

## 2021-01-18 DIAGNOSIS — E66.3 OVERWEIGHT WITH BODY MASS INDEX (BMI) OF 29 TO 29.9 IN ADULT: Chronic | ICD-10-CM

## 2021-01-18 DIAGNOSIS — Z79.01 ANTICOAGULATED ON COUMADIN: ICD-10-CM

## 2021-01-18 DIAGNOSIS — I83.12 VARICOSE VEINS OF BOTH LOWER EXTREMITIES WITH INFLAMMATION: ICD-10-CM

## 2021-01-18 DIAGNOSIS — Z87.39 PERSONAL HISTORY OF GOUT: Chronic | ICD-10-CM

## 2021-01-18 LAB
ALBUMIN SERPL-MCNC: 4.5 G/DL (ref 3.5–5.2)
ALBUMIN/GLOB SERPL: 2.1 G/DL
ALP SERPL-CCNC: 70 U/L (ref 39–117)
ALT SERPL W P-5'-P-CCNC: 18 U/L (ref 1–41)
ANION GAP SERPL CALCULATED.3IONS-SCNC: 8.7 MMOL/L (ref 5–15)
AST SERPL-CCNC: 21 U/L (ref 1–40)
BILIRUB SERPL-MCNC: 0.8 MG/DL (ref 0–1.2)
BILIRUB UR QL STRIP: NEGATIVE
BUN SERPL-MCNC: 18 MG/DL (ref 8–23)
BUN/CREAT SERPL: 15 (ref 7–25)
CALCIUM SPEC-SCNC: 9.4 MG/DL (ref 8.6–10.5)
CHLORIDE SERPL-SCNC: 105 MMOL/L (ref 98–107)
CHOLEST SERPL-MCNC: 214 MG/DL (ref 0–200)
CLARITY UR: CLEAR
CO2 SERPL-SCNC: 27.3 MMOL/L (ref 22–29)
COLOR UR: YELLOW
CREAT SERPL-MCNC: 1.2 MG/DL (ref 0.76–1.27)
GFR SERPL CREATININE-BSD FRML MDRD: 59 ML/MIN/1.73
GLOBULIN UR ELPH-MCNC: 2.1 GM/DL
GLUCOSE SERPL-MCNC: 101 MG/DL (ref 65–99)
GLUCOSE UR STRIP-MCNC: NEGATIVE MG/DL
HDLC SERPL-MCNC: 72 MG/DL (ref 40–60)
HGB UR QL STRIP.AUTO: NEGATIVE
KETONES UR QL STRIP: NEGATIVE
LDLC SERPL CALC-MCNC: 131 MG/DL (ref 0–100)
LDLC/HDLC SERPL: 1.8 {RATIO}
LEUKOCYTE ESTERASE UR QL STRIP.AUTO: NEGATIVE
NITRITE UR QL STRIP: NEGATIVE
PH UR STRIP.AUTO: 5.5 [PH] (ref 5–8)
POTASSIUM SERPL-SCNC: 4.5 MMOL/L (ref 3.5–5.2)
PROT SERPL-MCNC: 6.6 G/DL (ref 6–8.5)
PROT UR QL STRIP: NEGATIVE
SODIUM SERPL-SCNC: 141 MMOL/L (ref 136–145)
SP GR UR STRIP: 1.02 (ref 1–1.03)
TRIGL SERPL-MCNC: 62 MG/DL (ref 0–150)
UROBILINOGEN UR QL STRIP: NORMAL
VLDLC SERPL-MCNC: 11 MG/DL (ref 5–40)

## 2021-01-18 PROCEDURE — 99214 OFFICE O/P EST MOD 30 MIN: CPT | Performed by: INTERNAL MEDICINE

## 2021-01-18 PROCEDURE — 82570 ASSAY OF URINE CREATININE: CPT

## 2021-01-18 PROCEDURE — 80061 LIPID PANEL: CPT

## 2021-01-18 PROCEDURE — 81003 URINALYSIS AUTO W/O SCOPE: CPT

## 2021-01-18 PROCEDURE — 80053 COMPREHEN METABOLIC PANEL: CPT

## 2021-01-18 PROCEDURE — 82043 UR ALBUMIN QUANTITATIVE: CPT

## 2021-01-18 RX ORDER — LISINOPRIL 2.5 MG/1
2.5 TABLET ORAL DAILY
Qty: 30 TABLET | Refills: 5 | Status: SHIPPED | OUTPATIENT
Start: 2021-01-18 | End: 2021-02-08 | Stop reason: SDUPTHER

## 2021-01-18 NOTE — PROGRESS NOTES
[facility]     Uriel Valverde  1945   [id number]      Patient Care Team:  Barbi Carlson MD as PCP - General (Internal Medicine)    Chief Complaint   Patient presents with   • Hypertension     f/u   • Hyperlipidemia   • Atrial Fibrillation            HPI  Patient is a 75 y.o. male presents with hypertension uncontrolled.  BPs at home have been running 138-1 140s over 70s most of the time.  Occasional blood pressure systolic in the 120s or the 150s to 160s.  He does eat a low-salt diet.  He does not drink sodas.  He is already on 4 mg doxazosin every evening.      CHRONIC CONDITIONS    For A. fib, he takes warfarin as prescribed.  He does PT/INRs regularly.  Last 1 last week was therapeutic.  He does not notice any rapid heartbeat or palpitations.    For varicose veins, he wear support socks every day and that helps a lot to keep swelling under control.  He also avoids salt in the diet.    With doxazosin, he does not notice any significant trouble with urination.    Past Medical History:   Diagnosis Date   • A-fib (CMS/HCC) 02/11/2004    chronic coumadin; failed cardioversion; normal echo and exercise nuclear stress test.  He opted not to have ablation.   • A-fib (CMS/HCC)     failed cardioversion, normal echo and exercise nuclear stress test.  He opted not to have ablation   • Abnormal CT scan, lung 2/9/2017    RLL opacity with air bronchograms    • Allergic Soy    Wheat   • Allergic rhinitis    • Allergy to wheat     as well as soybean   • Anemia    • BPH (benign prostatic hyperplasia)    • Cobalamin deficiency 11/15/2016   • Cough 2015    sec/to inflammatory pnuemonitis   • Elbow effusion, left 09/2015    presumed sec/to gout   • Gout 2015    right ankle and righ knee (also of right hand-remote); colchicine prn.Uric acid lowering with allopurinol   • H/O eye surgery 2003   • Hayfever     as child; resolved when family moved to [place] from [place]   • Hypertension    • Idiopathic chronic gout of  right knee without tophus 11/15/2016   • Insomnia    • Insomnia disorder related to known organic factor 2015    Due to medical condition   • Linear IgA dermatosis 2014    Follow with Dr Whitten in [facility] for treatment - on cellcept; with pruritis; dapsone and prednisone and mycophenolate and topicals Dr. Rod Whitten   • Macrocytic anemia 2016   • Medial meniscus tear     right; arthroscopy knee   • Sensitivity to sunlight     sun sensitivity rash; sun avoidance, for many years   • Shoulder fracture, right     childhood   • Spongiotic dermatitis    • Tonsillitis     childhood; Tonsillectomy   • Vitamin B12 deficiency        Past Surgical History:   Procedure Laterality Date   • COLONOSCOPY     • HERNIA REPAIR     • KNEE ARTHROSCOPY Right     due to meniscus tear   • TONSILLECTOMY      as a child   • VASECTOMY         Family History   Problem Relation Age of Onset   • Heart failure Father    • Coronary artery disease Father         COD   • Stroke Sister 63   • Cancer Sister         Lung Cancer       Social History     Socioeconomic History   • Marital status:      Spouse name: Not on file   • Number of children: Not on file   • Years of education: Not on file   • Highest education level: Not on file   Tobacco Use   • Smoking status: Former Smoker     Packs/day: 0.50     Years: 22.00     Pack years: 11.00     Types: Cigarettes     Start date: 1966     Quit date: 1978     Years since quittin.1   • Smokeless tobacco: Never Used   • Tobacco comment: no passive smoke exposure, quit 78   Substance and Sexual Activity   • Alcohol use: Yes     Alcohol/week: 2.0 standard drinks     Types: 2 Glasses of wine per week     Comment: daily   • Drug use: No   • Sexual activity: Never       Allergies   Allergen Reactions   • Other Rash     Soy, mold, and wheat       Review of Systems:     Review of Systems   Constitutional: Negative for chills, fatigue and fever.   HENT: Negative for  "congestion, ear pain and sinus pressure.    Respiratory: Negative for cough, chest tightness, shortness of breath and wheezing.    Cardiovascular: Positive for leg swelling. Negative for chest pain and palpitations.   Gastrointestinal: Negative for abdominal pain, blood in stool and constipation.   Skin: Negative for color change.   Allergic/Immunologic: Negative for environmental allergies.   Neurological: Negative for dizziness and headache.   Psychiatric/Behavioral: Negative for depressed mood. The patient is not nervous/anxious.        Vital Signs  Vitals:    01/18/21 0928   BP: 138/78   BP Location: Left arm   Patient Position: Sitting   Cuff Size: Adult   Pulse: 58   Temp: 96.9 °F (36.1 °C)   TempSrc: Infrared   Weight: 103 kg (228 lb)   Height: 186 cm (73.23\")   PainSc: 0-No pain     Body mass index is 29.89 kg/m².      Current Outpatient Medications:   •  allopurinol (ZYLOPRIM) 100 MG tablet, Take 1 tablet by mouth Daily., Disp: 90 tablet, Rfl: 1  •  Cholecalciferol (VITAMIN D3) 2000 units tablet, Take 1 tablet by mouth Daily., Disp: , Rfl:   •  doxazosin (CARDURA) 4 MG tablet, Take 1 tablet by mouth Every Night., Disp: 90 tablet, Rfl: 1  •  fexofenadine (ALLEGRA) 180 MG tablet, Take 180 mg by mouth Daily., Disp: , Rfl:   •  folic acid (FOLVITE) 1 MG tablet, Take 1 tablet by mouth Daily., Disp: , Rfl:   •  warfarin (COUMADIN) 10 MG tablet, Take 10mg daily 2 days/week and 9 mg daily 5 days/week, Disp: 45 tablet, Rfl: 1  •  warfarin (COUMADIN) 4 MG tablet, Take 10 mg daily 2 days/week and 9 mg daily 5 days/week, Disp: 90 tablet, Rfl: 1  •  warfarin (COUMADIN) 5 MG tablet, Take 10 mg daily 2 days/week and 9 mg daily 5 days/week, Disp: 90 tablet, Rfl: 1  •  lisinopril (PRINIVIL,ZESTRIL) 2.5 MG tablet, Take 1 tablet by mouth Daily., Disp: 30 tablet, Rfl: 5    Physical Exam:    Physical Exam  Vitals signs and nursing note reviewed.   Constitutional:       Appearance: He is well-developed and overweight.   HENT:    "   Head: Normocephalic.   Eyes:      Conjunctiva/sclera: Conjunctivae normal.      Pupils: Pupils are equal, round, and reactive to light.   Neck:      Musculoskeletal: Normal range of motion and neck supple.      Thyroid: No thyromegaly.   Cardiovascular:      Rate and Rhythm: Normal rate and regular rhythm.      Heart sounds: Normal heart sounds.      Comments: Very mild bilateral ankle edema with support socks in place.  No pitting.  Pulmonary:      Effort: Pulmonary effort is normal.      Breath sounds: Normal breath sounds. No wheezing.   Musculoskeletal: Normal range of motion.   Lymphadenopathy:      Cervical: No cervical adenopathy.   Skin:     General: Skin is warm and dry.   Neurological:      Mental Status: He is alert and oriented to person, place, and time.   Psychiatric:         Thought Content: Thought content normal.          ACE III MINI        Results Review:    We reviewed his lab values from July.    CMP:  Lab Results   Component Value Date    BUN 24 (H) 07/16/2020    CREATININE 1.10 07/16/2020    EGFRIFNONA 65 07/16/2020    BCR 21.8 07/16/2020     07/16/2020    K 4.5 07/16/2020    CO2 27.2 07/16/2020    CALCIUM 9.4 07/16/2020    ALBUMIN 4.20 07/16/2020    BILITOT 0.4 07/16/2020    ALKPHOS 59 07/16/2020    AST 23 07/16/2020    ALT 20 07/16/2020     HbA1c:  No results found for: HGBA1C  Microalbumin:  Lab Results   Component Value Date    MICROALBUR <1.2 07/16/2020     Lipid Panel  Lab Results   Component Value Date    CHOL 189 07/16/2020    TRIG 34 07/16/2020    HDL 74 (H) 07/16/2020     (H) 07/16/2020    AST 23 07/16/2020    ALT 20 07/16/2020       Medication Review: Medications reviewed and noted  Patient Instructions   Problem List Items Addressed This Visit        Cardiac and Vasculature    Mixed hyperlipidemia (Chronic)    Overview     1/18/2021 Barbi Carlson MD    Continue regular exercise.  Continue to improve the low-fat low sugar diet.  Eat smaller portions at mealtime.          Relevant Orders    Lipid Panel    Benign essential hypertension - Primary    Overview     1/18/2021 Barbi Carlson MD    Start taking lisinopril 2.5mg daily in morning. Continue doxazosin every evening.  Continue to avoid salt in the diet and avoid sodas.    He will send us a record of his blood pressures in about 3 weeks.  Goal is 120s/ 70s or less.             Relevant Medications    doxazosin (CARDURA) 4 MG tablet    lisinopril (PRINIVIL,ZESTRIL) 2.5 MG tablet    Other Relevant Orders    Comprehensive Metabolic Panel    Microalbumin / Creatinine Urine Ratio - Urine, Clean Catch    Urinalysis without microscopic (no culture) - Urine, Clean Catch    Chronic atrial fibrillation    Overview     chronic coumadin  1/18/2021 Barbi Carlson MD    Continue anticoagulation with Coumadin and regular PT/INR checks.           Varicose veins of lower extremity    Overview     Varicose veins with edema    1/18/2021 Barbi Carlson MD     Continue wearing support socks daily.  Continue to avoid salt in the diet.                Coag and Thromboembolic    Anticoagulated on Coumadin    Overview     1/18/2021 Barbi Carlson MD    Continue regular PT/INR.            Genitourinary and Reproductive     Benign prostatic hyperplasia without urinary obstruction    Overview     1/18/2021 Barbi Carlson MD    Continue doxazosin every evening.            Musculoskeletal and Injuries    Personal history of gout (Chronic)    Overview     1/18/2021 Barbi Carlson MD    Continue allopurinol daily.            Other    Overweight with body mass index (BMI) of 29 to 29.9 in adult (Chronic)    Overview     1/18/2021 Barbi Carlson MD    Continue regular exercise and walking.  Continue low-fat low sugar diet.                  Diagnosis Plan   1. Benign essential hypertension  Comprehensive Metabolic Panel    Microalbumin / Creatinine Urine Ratio - Urine, Clean Catch    Urinalysis without microscopic (no culture) - Urine,  Clean Catch   2. Chronic atrial fibrillation     3. Varicose veins of both lower extremities with inflammation     4. Mixed hyperlipidemia  Lipid Panel   5. Anticoagulated on Coumadin     6. Benign prostatic hyperplasia without urinary obstruction     7. Personal history of gout     8. Overweight with body mass index (BMI) of 29 to 29.9 in adult         Plan of care reviewed with patient at the conclusion of today's visit. Education was provided regarding diagnosis, management, and any prescribed or recommended OTC medications.Patient verbalizes understanding of and agreement with management plan.         Barbi Carlson MD

## 2021-01-18 NOTE — PATIENT INSTRUCTIONS
Patient Instructions   Problem List Items Addressed This Visit        Cardiac and Vasculature    Mixed hyperlipidemia (Chronic)    Overview     1/18/2021 Barbi Carlson MD    Continue regular exercise.  Continue to improve the low-fat low sugar diet.  Eat smaller portions at mealtime.         Relevant Orders    Lipid Panel    Benign essential hypertension - Primary    Overview     1/18/2021 Barbi Carlson MD    Start taking lisinopril 2.5mg daily in morning. Continue doxazosin every evening.  Continue to avoid salt in the diet and avoid sodas.    He will send us a record of his blood pressures in about 3 weeks.  Goal is 120s/ 70s or less.             Relevant Medications    doxazosin (CARDURA) 4 MG tablet    lisinopril (PRINIVIL,ZESTRIL) 2.5 MG tablet    Other Relevant Orders    Comprehensive Metabolic Panel    Microalbumin / Creatinine Urine Ratio - Urine, Clean Catch    Urinalysis without microscopic (no culture) - Urine, Clean Catch    Chronic atrial fibrillation    Overview     chronic coumadin  1/18/2021 Barbi Carlson MD    Continue anticoagulation with Coumadin and regular PT/INR checks.           Varicose veins of lower extremity    Overview     Varicose veins with edema    1/18/2021 Barbi Carlson MD     Continue wearing support socks daily.  Continue to avoid salt in the diet.                Coag and Thromboembolic    Anticoagulated on Coumadin    Overview     1/18/2021 Barbi Carlson MD    Continue regular PT/INR.            Genitourinary and Reproductive     Benign prostatic hyperplasia without urinary obstruction    Overview     1/18/2021 Barbi Carlson MD    Continue doxazosin every evening.            Musculoskeletal and Injuries    Personal history of gout (Chronic)    Overview     1/18/2021 Barbi Carlson MD    Continue allopurinol daily.            Other    Overweight with body mass index (BMI) of 29 to 29.9 in adult (Chronic)    Overview     1/18/2021 Barbi Carlson  MD    Continue regular exercise and walking.  Continue low-fat low sugar diet.

## 2021-01-19 LAB
ALBUMIN UR-MCNC: <1.2 MG/DL
CREAT UR-MCNC: 110.1 MG/DL
MICROALBUMIN/CREAT UR: NORMAL MG/G{CREAT}

## 2021-01-20 RX ORDER — DOXAZOSIN MESYLATE 4 MG/1
TABLET ORAL
Qty: 90 TABLET | Refills: 0 | Status: SHIPPED | OUTPATIENT
Start: 2021-01-20 | End: 2021-04-19 | Stop reason: SDUPTHER

## 2021-02-08 RX ORDER — LISINOPRIL 10 MG/1
10 TABLET ORAL DAILY
Qty: 90 TABLET | Refills: 1 | Status: SHIPPED | OUTPATIENT
Start: 2021-02-08 | End: 2021-02-23

## 2021-02-12 ENCOUNTER — LAB (OUTPATIENT)
Dept: LAB | Facility: HOSPITAL | Age: 76
End: 2021-02-12

## 2021-02-12 DIAGNOSIS — Z51.81 ENCOUNTER FOR THERAPEUTIC DRUG MONITORING: Primary | ICD-10-CM

## 2021-02-12 DIAGNOSIS — Z79.01 ANTICOAGULATED ON COUMADIN: ICD-10-CM

## 2021-02-12 LAB
INR PPP: 2.4 (ref 0.85–1.16)
PROTHROMBIN TIME: 25.9 SECONDS (ref 11.5–14)

## 2021-02-12 PROCEDURE — 93793 ANTICOAG MGMT PT WARFARIN: CPT | Performed by: INTERNAL MEDICINE

## 2021-02-12 PROCEDURE — 85610 PROTHROMBIN TIME: CPT

## 2021-02-23 DIAGNOSIS — I10 BENIGN ESSENTIAL HYPERTENSION: Primary | ICD-10-CM

## 2021-02-23 RX ORDER — OLMESARTAN MEDOXOMIL 20 MG/1
20 TABLET ORAL DAILY
Qty: 30 TABLET | Refills: 5 | Status: SHIPPED | OUTPATIENT
Start: 2021-02-23 | End: 2021-07-19 | Stop reason: SDUPTHER

## 2021-03-07 DIAGNOSIS — Z79.01 ANTICOAGULATED ON COUMADIN: ICD-10-CM

## 2021-03-07 DIAGNOSIS — Z51.81 ENCOUNTER FOR THERAPEUTIC DRUG MONITORING: Primary | ICD-10-CM

## 2021-03-11 ENCOUNTER — LAB (OUTPATIENT)
Dept: LAB | Facility: HOSPITAL | Age: 76
End: 2021-03-11

## 2021-03-11 DIAGNOSIS — Z51.81 ENCOUNTER FOR THERAPEUTIC DRUG MONITORING: ICD-10-CM

## 2021-03-11 LAB
INR PPP: 2.65 (ref 0.85–1.16)
PROTHROMBIN TIME: 28 SECONDS (ref 11.5–14)

## 2021-03-11 PROCEDURE — 85610 PROTHROMBIN TIME: CPT

## 2021-03-18 RX ORDER — WARFARIN SODIUM 10 MG/1
TABLET ORAL
Qty: 24 TABLET | Refills: 0 | Status: SHIPPED | OUTPATIENT
Start: 2021-03-18 | End: 2021-05-17

## 2021-04-08 ENCOUNTER — LAB (OUTPATIENT)
Dept: LAB | Facility: HOSPITAL | Age: 76
End: 2021-04-08

## 2021-04-08 DIAGNOSIS — Z79.01 ANTICOAGULATED ON COUMADIN: Primary | ICD-10-CM

## 2021-04-08 DIAGNOSIS — Z51.81 ENCOUNTER FOR THERAPEUTIC DRUG MONITORING: ICD-10-CM

## 2021-04-08 LAB
INR PPP: 2.33 (ref 0.85–1.16)
PROTHROMBIN TIME: 24.6 SECONDS (ref 11.4–14.4)

## 2021-04-08 PROCEDURE — 85610 PROTHROMBIN TIME: CPT

## 2021-04-19 RX ORDER — DOXAZOSIN MESYLATE 4 MG/1
4 TABLET ORAL NIGHTLY
Qty: 90 TABLET | Refills: 1 | Status: SHIPPED | OUTPATIENT
Start: 2021-04-19 | End: 2021-07-19 | Stop reason: SDUPTHER

## 2021-04-19 RX ORDER — ALLOPURINOL 100 MG/1
100 TABLET ORAL DAILY
Qty: 90 TABLET | Refills: 1 | Status: SHIPPED | OUTPATIENT
Start: 2021-04-19 | End: 2021-07-19 | Stop reason: SDUPTHER

## 2021-05-07 ENCOUNTER — LAB (OUTPATIENT)
Dept: LAB | Facility: HOSPITAL | Age: 76
End: 2021-05-07

## 2021-05-07 DIAGNOSIS — Z79.01 ANTICOAGULATED ON COUMADIN: ICD-10-CM

## 2021-05-07 DIAGNOSIS — Z51.81 ENCOUNTER FOR THERAPEUTIC DRUG MONITORING: ICD-10-CM

## 2021-05-07 LAB
INR PPP: 2.13 (ref 0.85–1.16)
PROTHROMBIN TIME: 23 SECONDS (ref 11.4–14.4)

## 2021-05-07 PROCEDURE — 85610 PROTHROMBIN TIME: CPT

## 2021-05-07 PROCEDURE — 93793 ANTICOAG MGMT PT WARFARIN: CPT | Performed by: INTERNAL MEDICINE

## 2021-05-17 RX ORDER — WARFARIN SODIUM 10 MG/1
TABLET ORAL
Qty: 24 TABLET | Refills: 0 | Status: SHIPPED | OUTPATIENT
Start: 2021-05-17 | End: 2021-07-19 | Stop reason: SDUPTHER

## 2021-06-03 ENCOUNTER — LAB (OUTPATIENT)
Dept: LAB | Facility: HOSPITAL | Age: 76
End: 2021-06-03

## 2021-06-03 DIAGNOSIS — Z79.01 ANTICOAGULATED ON COUMADIN: ICD-10-CM

## 2021-06-03 DIAGNOSIS — Z51.81 ENCOUNTER FOR THERAPEUTIC DRUG MONITORING: Primary | ICD-10-CM

## 2021-06-03 LAB
INR PPP: 3.05 (ref 0.85–1.16)
PROTHROMBIN TIME: 30.2 SECONDS (ref 11.4–14.4)

## 2021-06-03 PROCEDURE — 85610 PROTHROMBIN TIME: CPT

## 2021-06-03 PROCEDURE — 93793 ANTICOAG MGMT PT WARFARIN: CPT | Performed by: INTERNAL MEDICINE

## 2021-07-01 ENCOUNTER — LAB (OUTPATIENT)
Dept: LAB | Facility: HOSPITAL | Age: 76
End: 2021-07-01

## 2021-07-01 DIAGNOSIS — Z79.01 ANTICOAGULATED ON COUMADIN: ICD-10-CM

## 2021-07-01 DIAGNOSIS — Z51.81 ENCOUNTER FOR THERAPEUTIC DRUG MONITORING: Primary | ICD-10-CM

## 2021-07-01 LAB
INR PPP: 2.86 (ref 0.85–1.16)
PROTHROMBIN TIME: 28.8 SECONDS (ref 11.4–14.4)

## 2021-07-01 PROCEDURE — 85610 PROTHROMBIN TIME: CPT

## 2021-07-01 PROCEDURE — 93793 ANTICOAG MGMT PT WARFARIN: CPT | Performed by: INTERNAL MEDICINE

## 2021-07-06 RX ORDER — WARFARIN SODIUM 5 MG/1
TABLET ORAL
Qty: 90 TABLET | Refills: 0 | Status: SHIPPED | OUTPATIENT
Start: 2021-07-06 | End: 2022-01-24 | Stop reason: SDUPTHER

## 2021-07-06 RX ORDER — WARFARIN SODIUM 4 MG/1
TABLET ORAL
Qty: 64 TABLET | Refills: 0 | Status: SHIPPED | OUTPATIENT
Start: 2021-07-06 | End: 2021-11-08 | Stop reason: SDUPTHER

## 2021-07-19 ENCOUNTER — LAB (OUTPATIENT)
Dept: LAB | Facility: HOSPITAL | Age: 76
End: 2021-07-19

## 2021-07-19 ENCOUNTER — OFFICE VISIT (OUTPATIENT)
Dept: INTERNAL MEDICINE | Facility: CLINIC | Age: 76
End: 2021-07-19

## 2021-07-19 ENCOUNTER — TELEPHONE (OUTPATIENT)
Dept: INTERNAL MEDICINE | Facility: CLINIC | Age: 76
End: 2021-07-19

## 2021-07-19 VITALS
BODY MASS INDEX: 30.62 KG/M2 | SYSTOLIC BLOOD PRESSURE: 118 MMHG | OXYGEN SATURATION: 97 % | RESPIRATION RATE: 16 BRPM | WEIGHT: 231 LBS | TEMPERATURE: 98.2 F | HEIGHT: 73 IN | HEART RATE: 49 BPM | DIASTOLIC BLOOD PRESSURE: 62 MMHG

## 2021-07-19 DIAGNOSIS — R00.1 BRADYCARDIA: Chronic | ICD-10-CM

## 2021-07-19 DIAGNOSIS — E78.2 MIXED HYPERLIPIDEMIA: ICD-10-CM

## 2021-07-19 DIAGNOSIS — Z12.5 PROSTATE CANCER SCREENING: ICD-10-CM

## 2021-07-19 DIAGNOSIS — I48.20 CHRONIC ATRIAL FIBRILLATION (HCC): ICD-10-CM

## 2021-07-19 DIAGNOSIS — I10 BENIGN ESSENTIAL HYPERTENSION: ICD-10-CM

## 2021-07-19 DIAGNOSIS — I83.893 VARICOSE VEINS OF BILATERAL LOWER EXTREMITIES WITH OTHER COMPLICATIONS: ICD-10-CM

## 2021-07-19 DIAGNOSIS — E66.09 CLASS 1 OBESITY DUE TO EXCESS CALORIES WITH SERIOUS COMORBIDITY AND BODY MASS INDEX (BMI) OF 30.0 TO 30.9 IN ADULT: Chronic | ICD-10-CM

## 2021-07-19 DIAGNOSIS — Z87.39 PERSONAL HISTORY OF GOUT: Chronic | ICD-10-CM

## 2021-07-19 DIAGNOSIS — Z00.00 ANNUAL PHYSICAL EXAM: ICD-10-CM

## 2021-07-19 DIAGNOSIS — N40.0 BENIGN PROSTATIC HYPERPLASIA WITHOUT URINARY OBSTRUCTION: ICD-10-CM

## 2021-07-19 DIAGNOSIS — E55.9 VITAMIN D DEFICIENCY: ICD-10-CM

## 2021-07-19 DIAGNOSIS — Z00.00 MEDICARE ANNUAL WELLNESS VISIT, SUBSEQUENT: Primary | ICD-10-CM

## 2021-07-19 PROBLEM — E66.811 CLASS 1 OBESITY DUE TO EXCESS CALORIES WITH SERIOUS COMORBIDITY AND BODY MASS INDEX (BMI) OF 30.0 TO 30.9 IN ADULT: Chronic | Status: ACTIVE | Noted: 2020-07-16

## 2021-07-19 LAB
25(OH)D3 SERPL-MCNC: 67.4 NG/ML
ALBUMIN SERPL-MCNC: 4.5 G/DL (ref 3.5–5.2)
ALBUMIN/GLOB SERPL: 2 G/DL
ALP SERPL-CCNC: 62 U/L (ref 39–117)
ALT SERPL W P-5'-P-CCNC: 18 U/L (ref 1–41)
ANION GAP SERPL CALCULATED.3IONS-SCNC: 8 MMOL/L (ref 5–15)
AST SERPL-CCNC: 22 U/L (ref 1–40)
BASOPHILS # BLD AUTO: 0.04 10*3/MM3 (ref 0–0.2)
BASOPHILS NFR BLD AUTO: 0.6 % (ref 0–1.5)
BILIRUB SERPL-MCNC: 0.6 MG/DL (ref 0–1.2)
BUN SERPL-MCNC: 17 MG/DL (ref 8–23)
BUN/CREAT SERPL: 15.6 (ref 7–25)
CALCIUM SPEC-SCNC: 9.3 MG/DL (ref 8.6–10.5)
CHLORIDE SERPL-SCNC: 107 MMOL/L (ref 98–107)
CHOLEST SERPL-MCNC: 231 MG/DL (ref 0–200)
CO2 SERPL-SCNC: 26 MMOL/L (ref 22–29)
CREAT SERPL-MCNC: 1.09 MG/DL (ref 0.76–1.27)
DEPRECATED RDW RBC AUTO: 47.8 FL (ref 37–54)
EOSINOPHIL # BLD AUTO: 0.13 10*3/MM3 (ref 0–0.4)
EOSINOPHIL NFR BLD AUTO: 1.9 % (ref 0.3–6.2)
ERYTHROCYTE [DISTWIDTH] IN BLOOD BY AUTOMATED COUNT: 13.1 % (ref 12.3–15.4)
GFR SERPL CREATININE-BSD FRML MDRD: 66 ML/MIN/1.73
GLOBULIN UR ELPH-MCNC: 2.3 GM/DL
GLUCOSE SERPL-MCNC: 104 MG/DL (ref 65–99)
HCT VFR BLD AUTO: 39.9 % (ref 37.5–51)
HDLC SERPL-MCNC: 71 MG/DL (ref 40–60)
HGB BLD-MCNC: 13.6 G/DL (ref 13–17.7)
IMM GRANULOCYTES # BLD AUTO: 0.02 10*3/MM3 (ref 0–0.05)
IMM GRANULOCYTES NFR BLD AUTO: 0.3 % (ref 0–0.5)
LDLC SERPL CALC-MCNC: 148 MG/DL (ref 0–100)
LDLC/HDLC SERPL: 2.06 {RATIO}
LYMPHOCYTES # BLD AUTO: 3.74 10*3/MM3 (ref 0.7–3.1)
LYMPHOCYTES NFR BLD AUTO: 53.9 % (ref 19.6–45.3)
MCH RBC QN AUTO: 34.2 PG (ref 26.6–33)
MCHC RBC AUTO-ENTMCNC: 34.1 G/DL (ref 31.5–35.7)
MCV RBC AUTO: 100.3 FL (ref 79–97)
MONOCYTES # BLD AUTO: 0.29 10*3/MM3 (ref 0.1–0.9)
MONOCYTES NFR BLD AUTO: 4.2 % (ref 5–12)
NEUTROPHILS NFR BLD AUTO: 2.72 10*3/MM3 (ref 1.7–7)
NEUTROPHILS NFR BLD AUTO: 39.1 % (ref 42.7–76)
NRBC BLD AUTO-RTO: 0 /100 WBC (ref 0–0.2)
PLATELET # BLD AUTO: 147 10*3/MM3 (ref 140–450)
PMV BLD AUTO: 11.5 FL (ref 6–12)
POTASSIUM SERPL-SCNC: 4.7 MMOL/L (ref 3.5–5.2)
PROT SERPL-MCNC: 6.8 G/DL (ref 6–8.5)
RBC # BLD AUTO: 3.98 10*6/MM3 (ref 4.14–5.8)
SODIUM SERPL-SCNC: 141 MMOL/L (ref 136–145)
TRIGL SERPL-MCNC: 68 MG/DL (ref 0–150)
VLDLC SERPL-MCNC: 12 MG/DL (ref 5–40)
WBC # BLD AUTO: 6.94 10*3/MM3 (ref 3.4–10.8)

## 2021-07-19 PROCEDURE — 82570 ASSAY OF URINE CREATININE: CPT

## 2021-07-19 PROCEDURE — 1159F MED LIST DOCD IN RCRD: CPT | Performed by: INTERNAL MEDICINE

## 2021-07-19 PROCEDURE — 1170F FXNL STATUS ASSESSED: CPT | Performed by: INTERNAL MEDICINE

## 2021-07-19 PROCEDURE — 81001 URINALYSIS AUTO W/SCOPE: CPT

## 2021-07-19 PROCEDURE — 1126F AMNT PAIN NOTED NONE PRSNT: CPT | Performed by: INTERNAL MEDICINE

## 2021-07-19 PROCEDURE — 99397 PER PM REEVAL EST PAT 65+ YR: CPT | Performed by: INTERNAL MEDICINE

## 2021-07-19 PROCEDURE — 85025 COMPLETE CBC W/AUTO DIFF WBC: CPT

## 2021-07-19 PROCEDURE — 80061 LIPID PANEL: CPT

## 2021-07-19 PROCEDURE — 82043 UR ALBUMIN QUANTITATIVE: CPT

## 2021-07-19 PROCEDURE — 93000 ELECTROCARDIOGRAM COMPLETE: CPT | Performed by: INTERNAL MEDICINE

## 2021-07-19 PROCEDURE — 82306 VITAMIN D 25 HYDROXY: CPT

## 2021-07-19 PROCEDURE — 80053 COMPREHEN METABOLIC PANEL: CPT

## 2021-07-19 PROCEDURE — 84443 ASSAY THYROID STIM HORMONE: CPT

## 2021-07-19 PROCEDURE — G0439 PPPS, SUBSEQ VISIT: HCPCS | Performed by: INTERNAL MEDICINE

## 2021-07-19 PROCEDURE — G0103 PSA SCREENING: HCPCS

## 2021-07-19 RX ORDER — DOXAZOSIN MESYLATE 4 MG/1
4 TABLET ORAL NIGHTLY
Qty: 90 TABLET | Refills: 3 | Status: SHIPPED | OUTPATIENT
Start: 2021-07-19 | End: 2021-10-18 | Stop reason: SDUPTHER

## 2021-07-19 RX ORDER — OLMESARTAN MEDOXOMIL 20 MG/1
20 TABLET ORAL DAILY
Qty: 90 TABLET | Refills: 3 | Status: SHIPPED | OUTPATIENT
Start: 2021-07-19 | End: 2022-07-22 | Stop reason: SDUPTHER

## 2021-07-19 RX ORDER — WARFARIN SODIUM 10 MG/1
TABLET ORAL
Qty: 30 TABLET | Refills: 5 | Status: SHIPPED | OUTPATIENT
Start: 2021-07-19 | End: 2022-07-22 | Stop reason: SDUPTHER

## 2021-07-19 RX ORDER — ALLOPURINOL 100 MG/1
100 TABLET ORAL DAILY
Qty: 90 TABLET | Refills: 3 | Status: SHIPPED | OUTPATIENT
Start: 2021-07-19 | End: 2021-10-18 | Stop reason: SDUPTHER

## 2021-07-19 RX ORDER — WARFARIN SODIUM 10 MG/1
TABLET ORAL
Qty: 24 TABLET | Refills: 5 | Status: SHIPPED | OUTPATIENT
Start: 2021-07-19 | End: 2021-07-19 | Stop reason: SDUPTHER

## 2021-07-19 NOTE — PROGRESS NOTES
The ABCs of the Annual Wellness Visit  Initial Medicare Wellness Visit    Chief Complaint   Patient presents with   • Medicare Wellness-subsequent   • Hyperlipidemia     f/u   • Hypertension       Subjective   History of Present Illness:  Uriel Valverde is a 76 y.o. male who presents for an Initial Medicare Wellness Visit.    CHRONIC CONDITIONS:    BPs at home run 110s/70s on olmesartan and cardura.    For hyperlipidemia, he does eat a low-fat low sugar diet.  He walks regularly.    A. fib is rate controlled.  He takes warfarin as instructed and gets PT/INRs regularly.    Wears support socks daily for varicose veins.    HEALTH RISK ASSESSMENT    Recent Hospitalizations:  No hospitalization(s) within the last year.    Current Medical Providers:  Patient Care Team:  Barbi Carlson MD as PCP - General (Internal Medicine)    Smoking Status:  Social History     Tobacco Use   Smoking Status Former Smoker   • Packs/day: 0.50   • Years: 22.00   • Pack years: 11.00   • Types: Cigarettes   • Start date: 1966   • Quit date: 1978   • Years since quittin.6   Smokeless Tobacco Never Used   Tobacco Comment    no passive smoke exposure, quit 79       Alcohol Consumption:  Social History     Substance and Sexual Activity   Alcohol Use Yes   • Alcohol/week: 2.0 standard drinks   • Types: 2 Glasses of wine per week    Comment: daily       Depression Screen:   PHQ-2/PHQ-9 Depression Screening 2021   Little interest or pleasure in doing things 0   Feeling down, depressed, or hopeless 0   Total Score 0       Fall Risk Screen:  YOSELYNADI Fall Risk Assessment was completed, and patient is at LOW risk for falls.Assessment completed on:2021    Health Habits and Functional and Cognitive Screening:  Functional & Cognitive Status 2021   Do you have difficulty preparing food and eating? No   Do you have difficulty bathing yourself, getting dressed or grooming yourself? No   Do you have difficulty using the  toilet? No   Do you have difficulty moving around from place to place? No   Do you have trouble with steps or getting out of a bed or a chair? No   Current Diet Well Balanced Diet   Dental Exam Up to date   Eye Exam Up to date   Exercise (times per week) 6 times per week   Current Exercises Include Walking   Current Exercise Activities Include -   Do you need help using the phone?  No   Are you deaf or do you have serious difficulty hearing?  No   Do you need help with transportation? No   Do you need help shopping? No   Do you need help preparing meals?  No   Do you need help with housework?  No   Do you need help with laundry? No   Do you need help taking your medications? No   Do you need help managing money? No   Do you ever drive or ride in a car without wearing a seat belt? No   Have you felt unusual stress, anger or loneliness in the last month? No   Who do you live with? Spouse   If you need help, do you have trouble finding someone available to you? No   Have you been bothered in the last four weeks by sexual problems? No   Do you have difficulty concentrating, remembering or making decisions? No       Does the patient have evidence of cognitive impairment? No    Asprin use counseling:Does not need ASA (and currently is not on it)    Age-appropriate Screening Schedule:  Refer to the list below for future screening recommendations based on patient's age, sex and/or medical conditions. Orders for these recommended tests are listed in the plan section. The patient has been provided with a written plan.    Age appropriate preventive counseling done including age appropriate vaccines, colonoscopy, regular dental visits, mental health, injury prevention such as wearing seat belt and preventing falls, healthy  nutrition, healthy weight, regular physical exercise. Alcohol use is moderate.  Tobacco history-none. Drug use-none.  STD's-not at risk.          Health Maintenance   Topic Date Due   • INFLUENZA VACCINE   08/01/2021   • LIPID PANEL  01/18/2022   • TDAP/TD VACCINES (4 - Td or Tdap) 01/17/2030   • ZOSTER VACCINE  Completed        The following portions of the patient's history were reviewed and updated as appropriate: allergies, current medications, past family history, past medical history, past social history, past surgical history and problem list.    Outpatient Medications Prior to Visit   Medication Sig Dispense Refill   • Cholecalciferol (VITAMIN D3) 2000 units tablet Take 1 tablet by mouth Daily.     • fexofenadine (ALLEGRA) 180 MG tablet Take 180 mg by mouth Daily.     • folic acid (FOLVITE) 1 MG tablet Take 1 tablet by mouth Daily.     • warfarin (COUMADIN) 4 MG tablet TAKE 10MG DAILY FOR 2 DAYS A WEEK AND 9MG DAILY FOR 5 DAYS A WEEK 64 tablet 0   • warfarin (COUMADIN) 5 MG tablet TAKE 10MG FOR 2 DAYS A WEEK AND 9MG FOR 5 DAYS A WEEK 90 tablet 0   • allopurinol (ZYLOPRIM) 100 MG tablet Take 1 tablet by mouth Daily. 90 tablet 1   • doxazosin (CARDURA) 4 MG tablet Take 1 tablet by mouth Every Night. 90 tablet 1   • olmesartan (Benicar) 20 MG tablet Take 1 tablet by mouth Daily. 30 tablet 5   • warfarin (COUMADIN) 10 MG tablet TAKE 10MG DAILY FOR TWO DAYS PER WEEK AND TAKE 9MG DAILY FOR FIVE DAYS PER WEEK 24 tablet 0     No facility-administered medications prior to visit.       Patient Active Problem List   Diagnosis   • Benign essential hypertension   • Chronic atrial fibrillation   • Allergic rhinitis   • Primary insomnia   • Other fatigue   • Varicose veins of lower extremity   • Bilateral hearing loss   • Allergy to mold   • Allergy to wheat   • Vitamin D deficiency   • Mixed hyperlipidemia   • Anticoagulated on Coumadin   • Erectile dysfunction   • Benign prostatic hyperplasia without urinary obstruction   • Adverse effect of anticoagulant   • Chronic right shoulder pain   • Personal history of gout   • Class 1 obesity due to excess calories with serious comorbidity and body mass index (BMI) of 30.0 to 30.9  "in adult   • Encounter for therapeutic drug monitoring   • Annual physical exam   • Medicare annual wellness visit, subsequent   • Bradycardia       Advanced Care Planning:  ACP discussion was held with the patient during this visit. Patient has an advance directive in EMR which is still valid.     Review of Systems   Constitutional: Negative for chills, fatigue and fever.   HENT: Negative for congestion, ear pain and sinus pressure.    Eyes: Negative for visual disturbance.   Respiratory: Negative for cough, chest tightness, shortness of breath and wheezing.    Cardiovascular: Negative for chest pain, palpitations and leg swelling.   Gastrointestinal: Negative for abdominal pain, blood in stool and constipation.   Endocrine: Negative for cold intolerance and heat intolerance.   Genitourinary: Negative for dysuria and frequency.   Musculoskeletal: Negative for arthralgias and back pain.   Skin: Negative for color change and rash.   Allergic/Immunologic: Negative for environmental allergies.   Neurological: Negative for dizziness and headaches.   Hematological: Negative for adenopathy. Does not bruise/bleed easily.   Psychiatric/Behavioral: Negative for dysphoric mood, sleep disturbance and suicidal ideas. The patient is not nervous/anxious.        Compared to one year ago, the patient feels his physical health is the same.  Compared to one year ago, the patient feels his mental health is the same.    Reviewed chart for potential of high risk medication in the elderly: yes  Reviewed chart for potential of harmful drug interactions in the elderly:yes    Objective         Vitals:    07/19/21 0938 07/19/21 1001 07/19/21 1003   BP: 150/80 116/60 118/62   BP Location: Left arm Right arm Left arm   Patient Position: Sitting Sitting Sitting   Cuff Size: Adult     Pulse: (!) 49     Resp: 16     Temp: 98.2 °F (36.8 °C)     TempSrc: Infrared     SpO2: 97%     Weight: 105 kg (231 lb)     Height: 186 cm (73.23\")     PainSc: 0-No " pain         Body mass index is 30.29 kg/m².  Discussed the patient's BMI with him. The BMI is above average; BMI management plan is completed.    Physical Exam  Vitals and nursing note reviewed.   Constitutional:       Appearance: He is well-developed and overweight.   HENT:      Head: Normocephalic.   Eyes:      Conjunctiva/sclera: Conjunctivae normal.      Pupils: Pupils are equal, round, and reactive to light.   Neck:      Thyroid: No thyromegaly.   Cardiovascular:      Rate and Rhythm: Normal rate and regular rhythm.      Heart sounds: Normal heart sounds.   Pulmonary:      Effort: Pulmonary effort is normal.      Breath sounds: Normal breath sounds. No wheezing.   Abdominal:      General: Bowel sounds are normal.      Palpations: Abdomen is soft.      Tenderness: There is no abdominal tenderness.   Musculoskeletal:         General: No tenderness. Normal range of motion.      Cervical back: Normal range of motion and neck supple.   Lymphadenopathy:      Cervical: No cervical adenopathy.   Skin:     General: Skin is warm and dry.      Findings: No rash.   Neurological:      Mental Status: He is alert and oriented to person, place, and time.      Cranial Nerves: No cranial nerve deficit.      Sensory: No sensory deficit.      Coordination: Coordination normal.      Gait: Gait normal.   Psychiatric:         Attention and Perception: Attention normal.         Mood and Affect: Mood normal.         Speech: Speech normal.         Behavior: Behavior normal.         Thought Content: Thought content normal.         Judgment: Judgment normal.             ECG 12 Lead    Date/Time: 7/19/2021 10:07 AM  Performed by: Barbi Carlson MD  Authorized by: Barbi Carlson MD   Comparison: compared with previous ECG   Similar to previous ECG  Rhythm: sinus bradycardia  Rate: normal  BPM: 48  Conduction: conduction normal  ST Segments: ST segments normal  T Waves: T waves normal  QRS axis: normal    Clinical impression: normal  ECG              Assessment/Plan   Medicare Risks and Personalized Health Plan  CMS Preventative Services Quick Reference  Cardiovascular risk  Obesity/Overweight     The above risks/problems have been discussed with the patient.  Pertinent information has been shared with the patient in the After Visit Summary.  Follow up plans and orders are seen below in the Assessment/Plan Section.  Patient Instructions   Problem List Items Addressed This Visit        Cardiac and Vasculature    Mixed hyperlipidemia (Chronic)    Overview     7/19/2021 Barbi Carlson MD    Continue regular exercise.  Continue to improve the low-fat low sugar diet.  Eat smaller portions at mealtime.         Relevant Orders    CBC & Differential    Comprehensive Metabolic Panel    Lipid Panel    TSH    Bradycardia (Chronic)    Overview     7/19/2021 Barbi Carlson MD    Chronic bradycardia is asymptomatic.         Benign essential hypertension    Overview     7/19/2021 Barbi Carlson MD    Continue olmesartan daily and doxazosin every evening.  Continue to avoid salt in the diet and avoid sodas.             Relevant Medications    olmesartan (Benicar) 20 MG tablet    doxazosin (CARDURA) 4 MG tablet    Other Relevant Orders    Urinalysis With Microscopic - Urine, Clean Catch    Microalbumin / Creatinine Urine Ratio - Urine, Clean Catch    ECG 12 Lead    Chronic atrial fibrillation    Overview     7/19/2021 Barbi Carlson MD    Continue anticoagulation with Coumadin and regular PT/INR checks.           Varicose veins of lower extremity    Overview     Varicose veins with edema    7/19/2021 Barbi Carlson MD     Continue wearing support socks daily.  Continue to avoid salt in the diet.                Endocrine and Metabolic    Vitamin D deficiency (Chronic)    Overview     7/19/2021 Barbi Carlson MD    Continue current dose of vitamin D3.         Relevant Orders    Vitamin D 25 Hydroxy    Class 1 obesity due to excess calories with  serious comorbidity and body mass index (BMI) of 30.0 to 30.9 in adult (Chronic)    Overview     7/19/2021 Barbi Carlson MD    Continue regular exercise and walking.  Continue low-fat low sugar diet.  Try to lose about 8 pounds over the next 6 months.            Genitourinary and Reproductive     Benign prostatic hyperplasia without urinary obstruction    Overview     7/19/2021 Barbi Carlson MD    Continue doxazosin every evening.            Health Encounters    Annual physical exam    Overview     Patient is up-to-date on all vaccinations.    Patient is up-to-date on colonoscopy and lab screenings.         Medicare annual wellness visit, subsequent - Primary    Overview     Patient is up-to-date on all vaccinations.    Patient is up-to-date on colonoscopy and lab screenings.              Musculoskeletal and Injuries    Personal history of gout (Chronic)    Overview     7/19/2021 Barbi Carlson MD    Continue allopurinol daily.           Other Visit Diagnoses     Prostate cancer screening        Relevant Orders    PSA Screen           Follow Up:  No follow-ups on file.     An After Visit Summary and PPPS were given to the patient.

## 2021-07-19 NOTE — PATIENT INSTRUCTIONS
Patient Instructions   Problem List Items Addressed This Visit        Cardiac and Vasculature    Mixed hyperlipidemia (Chronic)    Overview     7/19/2021 Barbi Carlson MD    Continue regular exercise.  Continue to improve the low-fat low sugar diet.  Eat smaller portions at mealtime.         Relevant Orders    CBC & Differential    Comprehensive Metabolic Panel    Lipid Panel    TSH    Bradycardia (Chronic)    Overview     7/19/2021 Barbi Carlson MD    Chronic bradycardia is asymptomatic.         Benign essential hypertension    Overview     7/19/2021 Barib Carlson MD    Continue olmesartan daily and doxazosin every evening.  Continue to avoid salt in the diet and avoid sodas.             Relevant Medications    olmesartan (Benicar) 20 MG tablet    doxazosin (CARDURA) 4 MG tablet    Other Relevant Orders    Urinalysis With Microscopic - Urine, Clean Catch    Microalbumin / Creatinine Urine Ratio - Urine, Clean Catch    ECG 12 Lead    Chronic atrial fibrillation    Overview     7/19/2021 Barbi Carlson MD    Continue anticoagulation with Coumadin and regular PT/INR checks.           Varicose veins of lower extremity    Overview     Varicose veins with edema    7/19/2021 Barbi Carlson MD     Continue wearing support socks daily.  Continue to avoid salt in the diet.                Endocrine and Metabolic    Vitamin D deficiency (Chronic)    Overview     7/19/2021 Barbi Carlson MD    Continue current dose of vitamin D3.         Relevant Orders    Vitamin D 25 Hydroxy    Class 1 obesity due to excess calories with serious comorbidity and body mass index (BMI) of 30.0 to 30.9 in adult (Chronic)    Overview     7/19/2021 Barbi Carlson MD    Continue regular exercise and walking.  Continue low-fat low sugar diet.  Try to lose about 8 pounds over the next 6 months.            Genitourinary and Reproductive     Benign prostatic hyperplasia without urinary obstruction    Overview     7/19/2021 Barbi  LEXIE Carlson MD    Continue doxazosin every evening.            Health Encounters    Annual physical exam    Overview     Patient is up-to-date on all vaccinations.    Patient is up-to-date on colonoscopy and lab screenings.         Medicare annual wellness visit, subsequent - Primary    Overview     Patient is up-to-date on all vaccinations.    Patient is up-to-date on colonoscopy and lab screenings.              Musculoskeletal and Injuries    Personal history of gout (Chronic)    Overview     7/19/2021 Barbi Carlson MD    Continue allopurinol daily.           Other Visit Diagnoses     Prostate cancer screening        Relevant Orders    PSA Screen          BMI for Adults  What is BMI?  Body mass index (BMI) is a number that is calculated from a person's weight and height. BMI can help estimate how much of a person's weight is composed of fat. BMI does not measure body fat directly. Rather, it is an alternative to procedures that directly measure body fat, which can be difficult and expensive.  BMI can help identify people who may be at higher risk for certain medical problems.  What are BMI measurements used for?  BMI is used as a screening tool to identify possible weight problems. It helps determine whether a person is obese, overweight, a healthy weight, or underweight.  BMI is useful for:  · Identifying a weight problem that may be related to a medical condition or may increase the risk for medical problems.  · Promoting changes, such as changes in diet and exercise, to help reach a healthy weight. BMI screening can be repeated to see if these changes are working.  How is BMI calculated?  BMI involves measuring your weight in relation to your height. Both height and weight are measured, and the BMI is calculated from those numbers. This can be done either in English ([place] or metric measurements. Note that charts and online BMI calculators are available to help you find your BMI quickly and easily without  "having to do these calculations yourself.  To calculate your BMI in English ([place] measurements:    1. Measure your weight in pounds (lb).  2. Multiply the number of pounds by 703.  ? For example, for a person who weighs 180 lb, multiply that number by 703, which equals 126,540.  3. Measure your height in inches. Then multiply that number by itself to get a measurement called \"inches squared.\"  ? For example, for a person who is 70 inches tall, the \"inches squared\" measurement is 70 inches x 70 inches, which equals 4,900 inches squared.  4. Divide the total from step 2 (number of lb x 703) by the total from step 3 (inches squared): 126,540 ÷ 4,900 = 25.8. This is your BMI.  To calculate your BMI in metric measurements:  1. Measure your weight in kilograms (kg).  2. Measure your height in meters (m). Then multiply that number by itself to get a measurement called \"meters squared.\"  ? For example, for a person who is 1.75 m tall, the \"meters squared\" measurement is 1.75 m x 1.75 m, which is equal to 3.1 meters squared.  3. Divide the number of kilograms (your weight) by the meters squared number. In this example: 70 ÷ 3.1 = 22.6. This is your BMI.  What do the results mean?  BMI charts are used to identify whether you are underweight, normal weight, overweight, or obese. The following guidelines will be used:  · Underweight: BMI less than 18.5.  · Normal weight: BMI between 18.5 and 24.9.  · Overweight: BMI between 25 and 29.9.  · Obese: BMI of 30 or above.  Keep these notes in mind:  · Weight includes both fat and muscle, so someone with a muscular build, such as an athlete, may have a BMI that is higher than 24.9. In cases like these, BMI is not an accurate measure of body fat.  · To determine if excess body fat is the cause of a BMI of 25 or higher, further assessments may need to be done by a health care provider.  · BMI is usually interpreted in the same way for men and women.  Where to find more information  For " more information about BMI, including tools to quickly calculate your BMI, go to these websites:  · Centers for Disease Control and Prevention: www.cdc.gov  · American Heart Association: www.heart.org  · National Heart, Lung, and Blood [place]: www.nhlbi.nih.gov  Summary  · Body mass index (BMI) is a number that is calculated from a person's weight and height.  · BMI may help estimate how much of a person's weight is composed of fat. BMI can help identify those who may be at higher risk for certain medical problems.  · BMI can be measured using English measurements or metric measurements.  · BMI charts are used to identify whether you are underweight, normal weight, overweight, or obese.  This information is not intended to replace advice given to you by your health care provider. Make sure you discuss any questions you have with your health care provider.  Document Revised: 09/09/2020 Document Reviewed: 07/17/2020  [employer] Patient Education © 2021 [employer]    Calorie Counting for Weight Loss  Calories are units of energy. Your body needs a certain number of calories from food to keep going throughout the day. When you eat or drink more calories than your body needs, your body stores the extra calories mostly as fat. When you eat or drink fewer calories than your body needs, your body burns fat to get the energy it needs.  Calorie counting means keeping track of how many calories you eat and drink each day. Calorie counting can be helpful if you need to lose weight. If you eat fewer calories than your body needs, you should lose weight. Ask your health care provider what a healthy weight is for you.  For calorie counting to work, you will need to eat the right number of calories each day to lose a healthy amount of weight per week. A dietitian can help you figure out how many calories you need in a day and will suggest ways to reach your calorie goal.  · A healthy amount of weight to lose each week is  usually 1-2 lb (0.5-0.9 kg). This usually means that your daily calorie intake should be reduced by 500-750 calories.  · Eating 1,200-1,500 calories a day can help most women lose weight.  · Eating 1,500-1,800 calories a day can help most men lose weight.  What do I need to know about calorie counting?  Work with your health care provider or dietitian to determine how many calories you should get each day. To meet your daily calorie goal, you will need to:  · Find out how many calories are in each food that you would like to eat. Try to do this before you eat.  · Decide how much of the food you plan to eat.  · Keep a food log. Do this by writing down what you ate and how many calories it had.  To successfully lose weight, it is important to balance calorie counting with a healthy lifestyle that includes regular activity.  Where do I find calorie information?    The number of calories in a food can be found on a Nutrition Facts label. If a food does not have a Nutrition Facts label, try to look up the calories online or ask your dietitian for help.  Remember that calories are listed per serving. If you choose to have more than one serving of a food, you will have to multiply the calories per serving by the number of servings you plan to eat. For example, the label on a package of bread might say that a serving size is 1 slice and that there are 90 calories in a serving. If you eat 1 slice, you will have eaten 90 calories. If you eat 2 slices, you will have eaten 180 calories.  How do I keep a food log?  After each time that you eat, record the following in your food log as soon as possible:  · What you ate. Be sure to include toppings, sauces, and other extras on the food.  · How much you ate. This can be measured in cups, ounces, or number of items.  · How many calories were in each food and drink.  · The total number of calories in the food you ate.  Keep your food log near you, such as in a pocket-sized notebook  or on an zuly or website on your mobile phone. Some programs will calculate calories for you and show you how many calories you have left to meet your daily goal.  What are some portion-control tips?  · Know how many calories are in a serving. This will help you know how many servings you can have of a certain food.  · Use a measuring cup to measure serving sizes. You could also try weighing out portions on a kitchen scale. With time, you will be able to estimate serving sizes for some foods.  · Take time to put servings of different foods on your favorite plates or in your favorite bowls and cups so you know what a serving looks like.  · Try not to eat straight from a food's packaging, such as from a bag or box. Eating straight from the package makes it hard to see how much you are eating and can lead to overeating. Put the amount you would like to eat in a cup or on a plate to make sure you are eating the right portion.  · Use smaller plates, glasses, and bowls for smaller portions and to prevent overeating.  · Try not to multitask. For example, avoid watching TV or using your computer while eating. If it is time to eat, sit down at a table and enjoy your food. This will help you recognize when you are full. It will also help you be more mindful of what and how much you are eating.  What are tips for following this plan?  Reading food labels  · Check the calorie count compared with the serving size. The serving size may be smaller than what you are used to eating.  · Check the source of the calories. Try to choose foods that are high in protein, fiber, and vitamins, and low in saturated fat, trans fat, and sodium.  Shopping  · Read nutrition labels while you shop. This will help you make healthy decisions about which foods to buy.  · Pay attention to nutrition labels for low-fat or fat-free foods. These foods sometimes have the same number of calories or more calories than the full-fat versions. They also often  have added sugar, starch, or salt to make up for flavor that was removed with the fat.  · Make a grocery list of lower-calorie foods and stick to it.  Cooking  · Try to cook your favorite foods in a healthier way. For example, try baking instead of frying.  · Use low-fat dairy products.  Meal planning  · Use more fruits and vegetables. One-half of your plate should be fruits and vegetables.  · Include lean proteins, such as chicken, turkey, and fish.  Lifestyle  Each week, aim to do one of the following:  · 150 minutes of moderate exercise, such as walking.  · 75 minutes of vigorous exercise, such as running.  General information  · Know how many calories are in the foods you eat most often. This will help you calculate calorie counts faster.  · Find a way of tracking calories that works for you. Get creative. Try different apps or programs if writing down calories does not work for you.  What foods should I eat?    · Eat nutritious foods. It is better to have a nutritious, high-calorie food, such as an avocado, than a food with few nutrients, such as a bag of potato chips.  · Use your calories on foods and drinks that will fill you up and will not leave you hungry soon after eating.  ? Examples of foods that fill you up are nuts and nut butters, vegetables, lean proteins, and high-fiber foods such as whole grains. High-fiber foods are foods with more than 5 g of fiber per serving.  · Pay attention to calories in drinks. Low-calorie drinks include water and unsweetened drinks.  The items listed above may not be a complete list of foods and beverages you can eat. Contact a dietitian for more information.  What foods should I limit?  Limit foods or drinks that are not good sources of vitamins, minerals, or protein or that are high in unhealthy fats. These include:  · Candy.  · Other sweets.  · Sodas, specialty coffee drinks, alcohol, and juice.  The items listed above may not be a complete list of foods and beverages  you should avoid. Contact a dietitian for more information.  How do I count calories when eating out?  · Pay attention to portions. Often, portions are much larger when eating out. Try these tips to keep portions smaller:  ? Consider sharing a meal instead of getting your own.  ? If you get your own meal, eat only half of it. Before you start eating, ask for a container and put half of your meal into it.  ? When available, consider ordering smaller portions from the menu instead of full portions.  · Pay attention to your food and drink choices. Knowing the way food is cooked and what is included with the meal can help you eat fewer calories.  ? If calories are listed on the menu, choose the lower-calorie options.  ? Choose dishes that include vegetables, fruits, whole grains, low-fat dairy products, and lean proteins.  ? Choose items that are boiled, broiled, grilled, or steamed. Avoid items that are buttered, battered, fried, or served with cream sauce. Items labeled as crispy are usually fried, unless stated otherwise.  ? Choose water, low-fat milk, unsweetened iced tea, or other drinks without added sugar. If you want an alcoholic beverage, choose a lower-calorie option, such as a glass of wine or light beer.  ? Ask for dressings, sauces, and syrups on the side. These are usually high in calories, so you should limit the amount you eat.  ? If you want a salad, choose a garden salad and ask for grilled meats. Avoid extra toppings such as vargas, cheese, or fried items. Ask for the dressing on the side, or ask for olive oil and vinegar or lemon to use as dressing.  · Estimate how many servings of a food you are given. Knowing serving sizes will help you be aware of how much food you are eating at restaurants.  Where to find more information  · Centers for Disease Control and Prevention: www.cdc.gov  · [place]. Department of Agriculture: myplate.gov  Summary  · Calorie counting means keeping track of how many calories  you eat and drink each day. If you eat fewer calories than your body needs, you should lose weight.  · A healthy amount of weight to lose per week is usually 1-2 lb (0.5-0.9 kg). This usually means reducing your daily calorie intake by 500-750 calories.  · The number of calories in a food can be found on a Nutrition Facts label. If a food does not have a Nutrition Facts label, try to look up the calories online or ask your dietitian for help.  · Use smaller plates, glasses, and bowls for smaller portions and to prevent overeating.  · Use your calories on foods and drinks that will fill you up and not leave you hungry shortly after a meal.  This information is not intended to replace advice given to you by your health care provider. Make sure you discuss any questions you have with your health care provider.  Document Revised: 01/28/2021 Document Reviewed: 01/28/2021  [employer] Patient Education © 2021 [employer]

## 2021-07-19 NOTE — TELEPHONE ENCOUNTER
I need to know what his dose is on the warfarin so I can send an exact dose to the pharmacy please

## 2021-07-19 NOTE — TELEPHONE ENCOUNTER
Pharmacy Name: [facility] - [address] [facility] [address] - [phone]  - [phone]        Pharmacy representative phone number: [phone]    What medication are you calling in regards to: WARFARIN     What question does the pharmacy have: PHARMACY STATES THAT THE INSTRUCTIONS ARE ONLY TO TAKE AS DIRECTED. THEY ARE NEEDING SOMETHING MORE SPECIFIC THAN THAT.   Who is the provider that prescribed the medication: DR. STRICKLAND

## 2021-07-20 LAB
ALBUMIN UR-MCNC: <1.2 MG/DL
BACTERIA UR QL AUTO: NORMAL /HPF
BILIRUB UR QL STRIP: NEGATIVE
CLARITY UR: CLEAR
COLOR UR: YELLOW
CREAT UR-MCNC: 26.3 MG/DL
GLUCOSE UR STRIP-MCNC: NEGATIVE MG/DL
HGB UR QL STRIP.AUTO: NEGATIVE
HYALINE CASTS UR QL AUTO: NORMAL /LPF
KETONES UR QL STRIP: NEGATIVE
LEUKOCYTE ESTERASE UR QL STRIP.AUTO: NEGATIVE
MICROALBUMIN/CREAT UR: NORMAL MG/G{CREAT}
NITRITE UR QL STRIP: NEGATIVE
PH UR STRIP.AUTO: 6 [PH] (ref 5–8)
PROT UR QL STRIP: NEGATIVE
PSA SERPL-MCNC: 2.32 NG/ML (ref 0–4)
RBC # UR: NORMAL /HPF
REF LAB TEST METHOD: NORMAL
SP GR UR STRIP: 1.01 (ref 1–1.03)
SQUAMOUS #/AREA URNS HPF: NORMAL /HPF
TSH SERPL DL<=0.05 MIU/L-ACNC: 2.05 UIU/ML (ref 0.27–4.2)
UROBILINOGEN UR QL STRIP: NORMAL
WBC UR QL AUTO: NORMAL /HPF

## 2021-07-21 ENCOUNTER — TELEPHONE (OUTPATIENT)
Dept: INTERNAL MEDICINE | Facility: CLINIC | Age: 76
End: 2021-07-21

## 2021-07-21 DIAGNOSIS — E78.2 MIXED HYPERLIPIDEMIA: Primary | ICD-10-CM

## 2021-07-21 RX ORDER — MELATONIN
1000 DAILY
Qty: 60 TABLET | Refills: 5
Start: 2021-07-21

## 2021-07-21 RX ORDER — CHOLECALCIFEROL (VITAMIN D3) 125 MCG
500 CAPSULE ORAL DAILY
Qty: 90 TABLET | Refills: 1
Start: 2021-07-21

## 2021-07-21 RX ORDER — PRAVASTATIN SODIUM 20 MG
20 TABLET ORAL NIGHTLY
Qty: 90 TABLET | Refills: 1 | Status: SHIPPED | OUTPATIENT
Start: 2021-07-21 | End: 2021-12-02

## 2021-07-21 NOTE — TELEPHONE ENCOUNTER
Pt called returning Sunita's call.  PT asked for a call back at the phone number below:  [phone]

## 2021-07-21 NOTE — TELEPHONE ENCOUNTER
Patient was agreeable to repeat lab work in [id number] to allow time for a response on dieting and exercising after deferring from use of statin medication.

## 2021-07-21 NOTE — TELEPHONE ENCOUNTER
Patient was informed of lab work and to decrease vit d, stay on folic acid and add B12 to medication regime.  Pt vu but will defer from pravastatin 20mg due to having complications in the past with statin drugs but agreed to increase exercise and try to lose weight.  Pt also agreeable to go to get fasting cholesterol lab in 6-8wks

## 2021-07-21 NOTE — TELEPHONE ENCOUNTER
L/m for pt to call back re: lab work and new medication         ----- Message from Barbi Carlson MD sent at 7/21/2021  8:49 AM EDT -----  Please call patient later today to go over medicine changes and see if he has any questions.    PSA is stable at 2.320.  A year ago it was 2.400.  Thyroid function, urine microalbumin, urinalysis, kidney and liver function, electrolytes, vitamin D level, and blood cell counts, are all acceptable.  You may decrease your vitamin D3 dose to 1000 units daily.  Continue folic acid tablet daily.  Also add an over-the-counter B12 500 mcg tablet daily.  MCV on your blood counts was elevated.  This goes with a low B12 level.Blood sugar is mildly elevated at 104.    LDL (bad cholesterol) has increased from 108 a year ago ,to 131 six months ago ,and now up to 148.  Goal is less than 100.  Total cholesterol is elevated at 231.  It was 189 a year ago.HDL (good cholesterol) is very good at 71.  Goal is greater than 45 in men.Triglycerides are very good at 68.  Goal is less than 150.    We need to start a low-dose statin to improve the cholesterol and decrease cardiac risk.  A prescription for pravastatin 20 mg tablet every evening was sent to the pharmacy.    Also increasing aerobic exercise and losing some weight will help with the blood sugar and the cholesterol.    Go by the [facility] any day in  6 to 8 weeks fasting to recheck cholesterol levels.

## 2021-07-21 NOTE — TELEPHONE ENCOUNTER
Okay since he is not going to be taking the statin in improving exercise and diet, tell him to give it 3 months before he rechecks to make sure there is time for a response

## 2021-07-29 ENCOUNTER — LAB (OUTPATIENT)
Dept: LAB | Facility: HOSPITAL | Age: 76
End: 2021-07-29

## 2021-07-29 DIAGNOSIS — Z51.81 ENCOUNTER FOR THERAPEUTIC DRUG MONITORING: Primary | ICD-10-CM

## 2021-07-29 DIAGNOSIS — Z79.01 ANTICOAGULATED ON COUMADIN: ICD-10-CM

## 2021-07-29 LAB
INR PPP: 2.73 (ref 0.85–1.16)
PROTHROMBIN TIME: 27.8 SECONDS (ref 11.4–14.4)

## 2021-07-29 PROCEDURE — 85610 PROTHROMBIN TIME: CPT

## 2021-07-29 PROCEDURE — 93793 ANTICOAG MGMT PT WARFARIN: CPT | Performed by: INTERNAL MEDICINE

## 2021-09-02 ENCOUNTER — LAB (OUTPATIENT)
Dept: LAB | Facility: HOSPITAL | Age: 76
End: 2021-09-02

## 2021-09-02 DIAGNOSIS — Z79.01 ANTICOAGULATED ON COUMADIN: ICD-10-CM

## 2021-09-02 DIAGNOSIS — Z51.81 ENCOUNTER FOR THERAPEUTIC DRUG MONITORING: Primary | ICD-10-CM

## 2021-09-02 LAB
INR PPP: 2.94 (ref 0.85–1.16)
PROTHROMBIN TIME: 29.4 SECONDS (ref 11.4–14.4)

## 2021-09-02 PROCEDURE — 85610 PROTHROMBIN TIME: CPT

## 2021-09-02 PROCEDURE — 93793 ANTICOAG MGMT PT WARFARIN: CPT | Performed by: INTERNAL MEDICINE

## 2021-10-07 ENCOUNTER — LAB (OUTPATIENT)
Dept: LAB | Facility: HOSPITAL | Age: 76
End: 2021-10-07

## 2021-10-07 DIAGNOSIS — Z51.81 ENCOUNTER FOR THERAPEUTIC DRUG MONITORING: Primary | ICD-10-CM

## 2021-10-07 DIAGNOSIS — Z79.01 ANTICOAGULATED ON COUMADIN: ICD-10-CM

## 2021-10-07 LAB
INR PPP: 2.28 (ref 0.85–1.16)
PROTHROMBIN TIME: 24.2 SECONDS (ref 11.4–14.4)

## 2021-10-07 PROCEDURE — 93793 ANTICOAG MGMT PT WARFARIN: CPT | Performed by: INTERNAL MEDICINE

## 2021-10-07 PROCEDURE — 85610 PROTHROMBIN TIME: CPT

## 2021-10-16 ENCOUNTER — PATIENT MESSAGE (OUTPATIENT)
Dept: INTERNAL MEDICINE | Facility: CLINIC | Age: 76
End: 2021-10-16

## 2021-10-18 RX ORDER — ALLOPURINOL 100 MG/1
100 TABLET ORAL DAILY
Qty: 90 TABLET | Refills: 3 | Status: SHIPPED | OUTPATIENT
Start: 2021-10-18 | End: 2022-10-07

## 2021-10-18 RX ORDER — DOXAZOSIN MESYLATE 4 MG/1
4 TABLET ORAL NIGHTLY
Qty: 90 TABLET | Refills: 3 | Status: SHIPPED | OUTPATIENT
Start: 2021-10-18 | End: 2022-07-22 | Stop reason: SDUPTHER

## 2021-10-18 NOTE — TELEPHONE ENCOUNTER
From: Uriel Valverde  To: Barbi Carlson MD  Sent: 10/16/2021 2:53 AM EDT  Subject: Refill    I need 90-day refills on  Doxazosin Mesylate 4 mg  Allopurinol 100 mg    Thank you.    Uriel Valverde

## 2021-11-04 ENCOUNTER — LAB (OUTPATIENT)
Dept: LAB | Facility: HOSPITAL | Age: 76
End: 2021-11-04

## 2021-11-04 DIAGNOSIS — Z79.01 ANTICOAGULATED ON COUMADIN: ICD-10-CM

## 2021-11-04 DIAGNOSIS — Z51.81 ENCOUNTER FOR THERAPEUTIC DRUG MONITORING: Primary | ICD-10-CM

## 2021-11-04 LAB
INR PPP: 2.6 (ref 0.85–1.16)
PROTHROMBIN TIME: 26.7 SECONDS (ref 11.4–14.4)

## 2021-11-04 PROCEDURE — 93793 ANTICOAG MGMT PT WARFARIN: CPT | Performed by: INTERNAL MEDICINE

## 2021-11-04 PROCEDURE — 85610 PROTHROMBIN TIME: CPT

## 2021-11-06 ENCOUNTER — PATIENT MESSAGE (OUTPATIENT)
Dept: INTERNAL MEDICINE | Facility: CLINIC | Age: 76
End: 2021-11-06

## 2021-11-08 RX ORDER — WARFARIN SODIUM 4 MG/1
TABLET ORAL
Qty: 64 TABLET | Refills: 0 | Status: SHIPPED | OUTPATIENT
Start: 2021-11-08 | End: 2022-01-24 | Stop reason: SDUPTHER

## 2021-11-08 NOTE — TELEPHONE ENCOUNTER
From: Uriel Valverde  To: Barbi Carlson MD  Sent: 11/6/2021 6:28 AM EDT  Subject: Warfarin 4 mg    I need a 90-day refill of this prescription. Thank you.    Uriel Valverde

## 2021-12-02 ENCOUNTER — LAB (OUTPATIENT)
Dept: LAB | Facility: HOSPITAL | Age: 76
End: 2021-12-02

## 2021-12-02 DIAGNOSIS — Z51.81 ENCOUNTER FOR THERAPEUTIC DRUG MONITORING: Primary | ICD-10-CM

## 2021-12-02 DIAGNOSIS — Z79.01 ANTICOAGULATED ON COUMADIN: ICD-10-CM

## 2021-12-02 LAB
INR PPP: 2.34 (ref 0.85–1.16)
PROTHROMBIN TIME: 24.7 SECONDS (ref 11.4–14.4)

## 2021-12-02 PROCEDURE — 93793 ANTICOAG MGMT PT WARFARIN: CPT | Performed by: INTERNAL MEDICINE

## 2021-12-02 PROCEDURE — 85610 PROTHROMBIN TIME: CPT

## 2022-01-03 ENCOUNTER — LAB (OUTPATIENT)
Dept: LAB | Facility: HOSPITAL | Age: 77
End: 2022-01-03

## 2022-01-03 DIAGNOSIS — Z51.81 ENCOUNTER FOR THERAPEUTIC DRUG MONITORING: ICD-10-CM

## 2022-01-03 DIAGNOSIS — Z51.81 ENCOUNTER FOR THERAPEUTIC DRUG MONITORING: Primary | ICD-10-CM

## 2022-01-03 DIAGNOSIS — Z79.01 ANTICOAGULATED ON COUMADIN: ICD-10-CM

## 2022-01-03 DIAGNOSIS — E78.2 MIXED HYPERLIPIDEMIA: ICD-10-CM

## 2022-01-03 LAB
ALBUMIN SERPL-MCNC: 4.3 G/DL (ref 3.5–5.2)
ALBUMIN/GLOB SERPL: 1.8 G/DL
ALP SERPL-CCNC: 69 U/L (ref 39–117)
ALT SERPL W P-5'-P-CCNC: 19 U/L (ref 1–41)
ANION GAP SERPL CALCULATED.3IONS-SCNC: 3.9 MMOL/L (ref 5–15)
AST SERPL-CCNC: 16 U/L (ref 1–40)
BILIRUB SERPL-MCNC: 0.4 MG/DL (ref 0–1.2)
BUN SERPL-MCNC: 22 MG/DL (ref 8–23)
BUN/CREAT SERPL: 21.6 (ref 7–25)
CALCIUM SPEC-SCNC: 9.3 MG/DL (ref 8.6–10.5)
CHLORIDE SERPL-SCNC: 102 MMOL/L (ref 98–107)
CHOLEST SERPL-MCNC: 216 MG/DL (ref 0–200)
CO2 SERPL-SCNC: 28.1 MMOL/L (ref 22–29)
CREAT SERPL-MCNC: 1.02 MG/DL (ref 0.76–1.27)
GFR SERPL CREATININE-BSD FRML MDRD: 71 ML/MIN/1.73
GLOBULIN UR ELPH-MCNC: 2.4 GM/DL
GLUCOSE SERPL-MCNC: 115 MG/DL (ref 65–99)
HCYS SERPL-MCNC: 11.3 UMOL/L (ref 0–15)
HDLC SERPL-MCNC: 80 MG/DL (ref 40–60)
INR PPP: 1.98 (ref 0.85–1.16)
LDLC SERPL CALC-MCNC: 127 MG/DL (ref 0–100)
LDLC/HDLC SERPL: 1.58 {RATIO}
POTASSIUM SERPL-SCNC: 4.5 MMOL/L (ref 3.5–5.2)
PROT SERPL-MCNC: 6.7 G/DL (ref 6–8.5)
PROTHROMBIN TIME: 21.8 SECONDS (ref 11.4–14.4)
SODIUM SERPL-SCNC: 134 MMOL/L (ref 136–145)
TRIGL SERPL-MCNC: 49 MG/DL (ref 0–150)
VLDLC SERPL-MCNC: 9 MG/DL (ref 5–40)

## 2022-01-03 PROCEDURE — 80053 COMPREHEN METABOLIC PANEL: CPT

## 2022-01-03 PROCEDURE — 83090 ASSAY OF HOMOCYSTEINE: CPT

## 2022-01-03 PROCEDURE — 85610 PROTHROMBIN TIME: CPT

## 2022-01-03 PROCEDURE — 80061 LIPID PANEL: CPT

## 2022-01-21 ENCOUNTER — OFFICE VISIT (OUTPATIENT)
Dept: INTERNAL MEDICINE | Facility: CLINIC | Age: 77
End: 2022-01-21

## 2022-01-21 VITALS
RESPIRATION RATE: 14 BRPM | HEART RATE: 63 BPM | TEMPERATURE: 97.7 F | BODY MASS INDEX: 28.1 KG/M2 | SYSTOLIC BLOOD PRESSURE: 104 MMHG | DIASTOLIC BLOOD PRESSURE: 68 MMHG | WEIGHT: 212 LBS | OXYGEN SATURATION: 98 % | HEIGHT: 73 IN

## 2022-01-21 DIAGNOSIS — I48.20 CHRONIC ATRIAL FIBRILLATION: ICD-10-CM

## 2022-01-21 DIAGNOSIS — I10 BENIGN ESSENTIAL HYPERTENSION: ICD-10-CM

## 2022-01-21 DIAGNOSIS — I83.893 VARICOSE VEINS OF BILATERAL LOWER EXTREMITIES WITH OTHER COMPLICATIONS: ICD-10-CM

## 2022-01-21 DIAGNOSIS — E78.2 MIXED HYPERLIPIDEMIA: Chronic | ICD-10-CM

## 2022-01-21 DIAGNOSIS — E66.3 OVERWEIGHT WITH BODY MASS INDEX (BMI) OF 28 TO 28.9 IN ADULT: Chronic | ICD-10-CM

## 2022-01-21 DIAGNOSIS — Z13.6 SCREENING FOR AAA (ABDOMINAL AORTIC ANEURYSM): Primary | ICD-10-CM

## 2022-01-21 DIAGNOSIS — Z79.01 ANTICOAGULATED ON COUMADIN: ICD-10-CM

## 2022-01-21 PROCEDURE — 99214 OFFICE O/P EST MOD 30 MIN: CPT | Performed by: INTERNAL MEDICINE

## 2022-01-21 NOTE — PATIENT INSTRUCTIONS
Patient Instructions   Problem List Items Addressed This Visit        Cardiac and Vasculature    Mixed hyperlipidemia (Chronic)    Overview     1/21/2022 Barbi Carlson MD    Continue regular exercise.  Continue to improve the low-fat low sugar diet.  Maintain weight loss.         Benign essential hypertension    Overview     1/21/2022 Barbi Carlson MD    Continue olmesartan daily and doxazosin every evening.           Relevant Medications    olmesartan (Benicar) 20 MG tablet    doxazosin (CARDURA) 4 MG tablet    Chronic atrial fibrillation    Overview     1/21/2022 Barbi Carlson MD    Continue anticoagulation with Coumadin and regular PT/INR checks.           Varicose veins of lower extremity    Overview     Varicose veins with edema    1/21/2022 Barbi Carlson MD     Continue wearing support socks daily.              Coag and Thromboembolic    Anticoagulated on Coumadin    Overview     1/21/2022 Barbi Carlson MD    Continue regular PT/INR.            Other    Overweight with body mass index (BMI) of 28 to 28.9 in adult (Chronic)    Overview     1/21/2022 Barbi Carlson MD    He has lost 20 pounds over the last 6 months.      Continue regular exercise and walking.  Continue low-fat low sugar diet.             Other Visit Diagnoses     Screening for AAA (abdominal aortic aneurysm)    -  Primary    Relevant Orders    [place] aaa screen limited             BMI for Adults  What is BMI?  Body mass index (BMI) is a number that is calculated from a person's weight and height. BMI can help estimate how much of a person's weight is composed of fat. BMI does not measure body fat directly. Rather, it is an alternative to procedures that directly measure body fat, which can be difficult and expensive.  BMI can help identify people who may be at higher risk for certain medical problems.  What are BMI measurements used for?  BMI is used as a screening tool to identify possible weight problems. It helps determine  "whether a person is obese, overweight, a healthy weight, or underweight.  BMI is useful for:  · Identifying a weight problem that may be related to a medical condition or may increase the risk for medical problems.  · Promoting changes, such as changes in diet and exercise, to help reach a healthy weight. BMI screening can be repeated to see if these changes are working.  How is BMI calculated?  BMI involves measuring your weight in relation to your height. Both height and weight are measured, and the BMI is calculated from those numbers. This can be done either in English ([place] or metric measurements. Note that charts and online BMI calculators are available to help you find your BMI quickly and easily without having to do these calculations yourself.  To calculate your BMI in English ([place] measurements:    1. Measure your weight in pounds (lb).  2. Multiply the number of pounds by 703.  ? For example, for a person who weighs 180 lb, multiply that number by 703, which equals 126,540.  3. Measure your height in inches. Then multiply that number by itself to get a measurement called \"inches squared.\"  ? For example, for a person who is 70 inches tall, the \"inches squared\" measurement is 70 inches x 70 inches, which equals 4,900 inches squared.  4. Divide the total from step 2 (number of lb x 703) by the total from step 3 (inches squared): 126,540 ÷ 4,900 = 25.8. This is your BMI.    To calculate your BMI in metric measurements:  1. Measure your weight in kilograms (kg).  2. Measure your height in meters (m). Then multiply that number by itself to get a measurement called \"meters squared.\"  ? For example, for a person who is 1.75 m tall, the \"meters squared\" measurement is 1.75 m x 1.75 m, which is equal to 3.1 meters squared.  3. Divide the number of kilograms (your weight) by the meters squared number. In this example: 70 ÷ 3.1 = 22.6. This is your BMI.  What do the results mean?  BMI charts are used to identify " whether you are underweight, normal weight, overweight, or obese. The following guidelines will be used:  · Underweight: BMI less than 18.5.  · Normal weight: BMI between 18.5 and 24.9.  · Overweight: BMI between 25 and 29.9.  · Obese: BMI of 30 or above.  Keep these notes in mind:  · Weight includes both fat and muscle, so someone with a muscular build, such as an athlete, may have a BMI that is higher than 24.9. In cases like these, BMI is not an accurate measure of body fat.  · To determine if excess body fat is the cause of a BMI of 25 or higher, further assessments may need to be done by a health care provider.  · BMI is usually interpreted in the same way for men and women.  Where to find more information  For more information about BMI, including tools to quickly calculate your BMI, go to these websites:  · Centers for Disease Control and Prevention: www.cdc.gov  · American Heart Association: www.heart.org  · National Heart, Lung, and Blood [employer]: www.nhlbi.nih.gov  Summary  · Body mass index (BMI) is a number that is calculated from a person's weight and height.  · BMI may help estimate how much of a person's weight is composed of fat. BMI can help identify those who may be at higher risk for certain medical problems.  · BMI can be measured using English measurements or metric measurements.  · BMI charts are used to identify whether you are underweight, normal weight, overweight, or obese.  This information is not intended to replace advice given to you by your health care provider. Make sure you discuss any questions you have with your health care provider.  Document Revised: 09/09/2020 Document Reviewed: 07/17/2020  Elsevier Patient Education © 2021 [employer]      Calorie Counting for Weight Loss  Calories are units of energy. Your body needs a certain number of calories from food to keep going throughout the day. When you eat or drink more calories than your body needs, your body stores the extra  calories mostly as fat. When you eat or drink fewer calories than your body needs, your body burns fat to get the energy it needs.  Calorie counting means keeping track of how many calories you eat and drink each day. Calorie counting can be helpful if you need to lose weight. If you eat fewer calories than your body needs, you should lose weight. Ask your health care provider what a healthy weight is for you.  For calorie counting to work, you will need to eat the right number of calories each day to lose a healthy amount of weight per week. A dietitian can help you figure out how many calories you need in a day and will suggest ways to reach your calorie goal.  · A healthy amount of weight to lose each week is usually 1-2 lb (0.5-0.9 kg). This usually means that your daily calorie intake should be reduced by 500-750 calories.  · Eating 1,200-1,500 calories a day can help most women lose weight.  · Eating 1,500-1,800 calories a day can help most men lose weight.  What do I need to know about calorie counting?  Work with your health care provider or dietitian to determine how many calories you should get each day. To meet your daily calorie goal, you will need to:  · Find out how many calories are in each food that you would like to eat. Try to do this before you eat.  · Decide how much of the food you plan to eat.  · Keep a food log. Do this by writing down what you ate and how many calories it had.  To successfully lose weight, it is important to balance calorie counting with a healthy lifestyle that includes regular activity.  Where do I find calorie information?    The number of calories in a food can be found on a Nutrition Facts label. If a food does not have a Nutrition Facts label, try to look up the calories online or ask your dietitian for help.  Remember that calories are listed per serving. If you choose to have more than one serving of a food, you will have to multiply the calories per serving by the  number of servings you plan to eat. For example, the label on a package of bread might say that a serving size is 1 slice and that there are 90 calories in a serving. If you eat 1 slice, you will have eaten 90 calories. If you eat 2 slices, you will have eaten 180 calories.  How do I keep a food log?  After each time that you eat, record the following in your food log as soon as possible:  · What you ate. Be sure to include toppings, sauces, and other extras on the food.  · How much you ate. This can be measured in cups, ounces, or number of items.  · How many calories were in each food and drink.  · The total number of calories in the food you ate.  Keep your food log near you, such as in a pocket-sized notebook or on an zuly or website on your mobile phone. Some programs will calculate calories for you and show you how many calories you have left to meet your daily goal.  What are some portion-control tips?  · Know how many calories are in a serving. This will help you know how many servings you can have of a certain food.  · Use a measuring cup to measure serving sizes. You could also try weighing out portions on a kitchen scale. With time, you will be able to estimate serving sizes for some foods.  · Take time to put servings of different foods on your favorite plates or in your favorite bowls and cups so you know what a serving looks like.  · Try not to eat straight from a food's packaging, such as from a bag or box. Eating straight from the package makes it hard to see how much you are eating and can lead to overeating. Put the amount you would like to eat in a cup or on a plate to make sure you are eating the right portion.  · Use smaller plates, glasses, and bowls for smaller portions and to prevent overeating.  · Try not to multitask. For example, avoid watching TV or using your computer while eating. If it is time to eat, sit down at a table and enjoy your food. This will help you recognize when you are  full. It will also help you be more mindful of what and how much you are eating.  What are tips for following this plan?  Reading food labels  · Check the calorie count compared with the serving size. The serving size may be smaller than what you are used to eating.  · Check the source of the calories. Try to choose foods that are high in protein, fiber, and vitamins, and low in saturated fat, trans fat, and sodium.  Shopping  · Read nutrition labels while you shop. This will help you make healthy decisions about which foods to buy.  · Pay attention to nutrition labels for low-fat or fat-free foods. These foods sometimes have the same number of calories or more calories than the full-fat versions. They also often have added sugar, starch, or salt to make up for flavor that was removed with the fat.  · Make a grocery list of lower-calorie foods and stick to it.  Cooking  · Try to cook your favorite foods in a healthier way. For example, try baking instead of frying.  · Use low-fat dairy products.  Meal planning  · Use more fruits and vegetables. One-half of your plate should be fruits and vegetables.  · Include lean proteins, such as chicken, turkey, and fish.  Lifestyle  Each week, aim to do one of the following:  · 150 minutes of moderate exercise, such as walking.  · 75 minutes of vigorous exercise, such as running.  General information  · Know how many calories are in the foods you eat most often. This will help you calculate calorie counts faster.  · Find a way of tracking calories that works for you. Get creative. Try different apps or programs if writing down calories does not work for you.  What foods should I eat?    · Eat nutritious foods. It is better to have a nutritious, high-calorie food, such as an avocado, than a food with few nutrients, such as a bag of potato chips.  · Use your calories on foods and drinks that will fill you up and will not leave you hungry soon after eating.  ? Examples of foods  that fill you up are nuts and nut butters, vegetables, lean proteins, and high-fiber foods such as whole grains. High-fiber foods are foods with more than 5 g of fiber per serving.  · Pay attention to calories in drinks. Low-calorie drinks include water and unsweetened drinks.  The items listed above may not be a complete list of foods and beverages you can eat. Contact a dietitian for more information.  What foods should I limit?  Limit foods or drinks that are not good sources of vitamins, minerals, or protein or that are high in unhealthy fats. These include:  · Candy.  · Other sweets.  · Sodas, specialty coffee drinks, alcohol, and juice.  The items listed above may not be a complete list of foods and beverages you should avoid. Contact a dietitian for more information.  How do I count calories when eating out?  · Pay attention to portions. Often, portions are much larger when eating out. Try these tips to keep portions smaller:  ? Consider sharing a meal instead of getting your own.  ? If you get your own meal, eat only half of it. Before you start eating, ask for a container and put half of your meal into it.  ? When available, consider ordering smaller portions from the menu instead of full portions.  · Pay attention to your food and drink choices. Knowing the way food is cooked and what is included with the meal can help you eat fewer calories.  ? If calories are listed on the menu, choose the lower-calorie options.  ? Choose dishes that include vegetables, fruits, whole grains, low-fat dairy products, and lean proteins.  ? Choose items that are boiled, broiled, grilled, or steamed. Avoid items that are buttered, battered, fried, or served with cream sauce. Items labeled as crispy are usually fried, unless stated otherwise.  ? Choose water, low-fat milk, unsweetened iced tea, or other drinks without added sugar. If you want an alcoholic beverage, choose a lower-calorie option, such as a glass of wine or  light beer.  ? Ask for dressings, sauces, and syrups on the side. These are usually high in calories, so you should limit the amount you eat.  ? If you want a salad, choose a garden salad and ask for grilled meats. Avoid extra toppings such as vargas, cheese, or fried items. Ask for the dressing on the side, or ask for olive oil and vinegar or lemon to use as dressing.  · Estimate how many servings of a food you are given. Knowing serving sizes will help you be aware of how much food you are eating at restaurants.  Where to find more information  · Centers for Disease Control and Prevention: www.cdc.gov  · [place] Department of Agriculture: myplate.gov  Summary  · Calorie counting means keeping track of how many calories you eat and drink each day. If you eat fewer calories than your body needs, you should lose weight.  · A healthy amount of weight to lose per week is usually 1-2 lb (0.5-0.9 kg). This usually means reducing your daily calorie intake by 500-750 calories.  · The number of calories in a food can be found on a Nutrition Facts label. If a food does not have a Nutrition Facts label, try to look up the calories online or ask your dietitian for help.  · Use smaller plates, glasses, and bowls for smaller portions and to prevent overeating.  · Use your calories on foods and drinks that will fill you up and not leave you hungry shortly after a meal.  This information is not intended to replace advice given to you by your health care provider. Make sure you discuss any questions you have with your health care provider.  Document Revised: 01/28/2021 Document Reviewed: 01/28/2021  [employer]vier Patient Education © 2021 [employer]

## 2022-01-21 NOTE — PROGRESS NOTES
[facility]     Uriel Valverde  1945   [id number]      Patient Care Team:  Barbi Carlson MD as PCP - General (Internal Medicine)    Chief Complaint   Patient presents with   • Hypertension   • Hyperlipidemia   • Atrial Fibrillation            HPI  Patient is a 76 y.o. male presents with hypertension, hyperlipidemia, A. fib    CHRONIC CONDITIONS  Lost 20 lb by walking more and walking the golf course and eating the big meal in middle of the day.  For hypertension, he is taking 20 mg of olmesartan daily and doxazosin 4 mg nightly.  Blood pressures have been very well controlled.    For A. fib, he has been on warfarin for many years.  He does not have any palpitations or rapid heartbeat or chest pain.    Past Medical History:   Diagnosis Date   • A-fib (HCC) 02/11/2004    chronic coumadin; failed cardioversion; normal echo and exercise nuclear stress test.  He opted not to have ablation.   • A-fib (HCC)     failed cardioversion, normal echo and exercise nuclear stress test.  He opted not to have ablation   • Abnormal CT scan, lung 2/9/2017    RLL opacity with air bronchograms    • Allergic Soy    Wheat   • Allergic rhinitis    • Allergy to wheat     as well as soybean   • Anemia    • BPH (benign prostatic hyperplasia)    • Cobalamin deficiency 11/15/2016   • Cough 2015    sec/to inflammatory pnuemonitis   • Elbow effusion, left 09/2015    presumed sec/to gout   • Gout 2015    right ankle and righ knee (also of right hand-remote); colchicine prn.Uric acid lowering with allopurinol   • H/O eye surgery 2003   • Hayfever     as child; resolved when family moved to [place] from [place]   • Hypertension    • 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 Rod Whitten   • Macrocytic anemia 2016   • Medial meniscus tear 2003    right; arthroscopy knee   • Sensitivity to sunlight     sun sensitivity rash; sun avoidance, for many years   • Shoulder fracture, right     childhood   • Spongiotic dermatitis    • Tonsillitis     childhood; Tonsillectomy   • Vitamin B12 deficiency        Past Surgical History:   Procedure Laterality Date   • COLONOSCOPY     • HERNIA REPAIR     • KNEE ARTHROSCOPY Right     due to meniscus tear   • TONSILLECTOMY      as a child   • VASECTOMY         Family History   Problem Relation Age of Onset   • Heart failure Father    • Coronary artery disease Father         COD   • Stroke Sister 63   • Cancer Sister         Lung Cancer       Social History     Socioeconomic History   • Marital status:    Tobacco Use   • Smoking status: Former Smoker     Packs/day: 0.50     Years: 22.00     Pack years: 11.00     Types: Cigarettes     Start date: 1966     Quit date: 1978     Years since quittin.1   • Smokeless tobacco: Never Used   • Tobacco comment: no passive smoke exposure, quit 79   Substance and Sexual Activity   • Alcohol use: Yes     Alcohol/week: 2.0 standard drinks     Types: 2 Glasses of wine per week     Comment: daily   • Drug use: No   • Sexual activity: Never       Allergies   Allergen Reactions   • Other Rash     Soy, mold, and wheat       Review of Systems:     Review of Systems    Vital Signs  Vitals:    22 1604   BP: 104/68   BP Location: Left arm   Patient Position: Sitting   Cuff Size: Adult   Pulse: 63   Resp: 14   Temp: 97.7 °F (36.5 °C)   TempSrc: Infrared   SpO2: 98%   Weight: 96.2 kg (212 lb)   Height: 185.4 cm (73\")   PainSc: 0-No pain     Body mass index is 27.97 kg/m².  Patient's Body mass index is 27.97 kg/m². indicating that he is overweight (BMI 25-29.9). Obesity-related health conditions include the following: hypertension and " dyslipidemias. Obesity is improving with lifestyle modifications. BMI is is above average; BMI management plan is completed. We discussed low calorie, low carb based diet program, portion control and increasing exercise..        Current Outpatient Medications:   •  allopurinol (ZYLOPRIM) 100 MG tablet, Take 1 tablet by mouth Daily., Disp: 90 tablet, Rfl: 3  •  cholecalciferol (Vitamin D, Cholecalciferol,) 25 MCG (1000 UT) tablet, Take 1 tablet by mouth Daily., Disp: 60 tablet, Rfl: 5  •  doxazosin (CARDURA) 4 MG tablet, Take 1 tablet by mouth Every Night., Disp: 90 tablet, Rfl: 3  •  fexofenadine (ALLEGRA) 180 MG tablet, Take 180 mg by mouth Daily., Disp: , Rfl:   •  folic acid (FOLVITE) 1 MG tablet, Take 1 tablet by mouth Daily., Disp: , Rfl:   •  olmesartan (Benicar) 20 MG tablet, Take 1 tablet by mouth Daily., Disp: 90 tablet, Rfl: 3  •  vitamin B-12 (CYANOCOBALAMIN) 500 MCG tablet, Take 1 tablet by mouth Daily., Disp: 90 tablet, Rfl: 1  •  warfarin (COUMADIN) 10 MG tablet, Take 10 mg on Thursdays and Sundays.  Take 9 mg all other days., Disp: 30 tablet, Rfl: 5  •  warfarin (COUMADIN) 4 MG tablet, TAKE 10MG DAILY FOR 2 DAYS A WEEK AND 9MG DAILY FOR 5 DAYS A WEEK, Disp: 64 tablet, Rfl: 0  •  warfarin (COUMADIN) 5 MG tablet, TAKE 10MG FOR 2 DAYS A WEEK AND 9MG FOR 5 DAYS A WEEK, Disp: 90 tablet, Rfl: 0    Physical Exam:    Physical Exam  Vitals and nursing note reviewed.   Constitutional:       Appearance: He is well-developed.   HENT:      Head: Normocephalic.   Eyes:      Conjunctiva/sclera: Conjunctivae normal.      Pupils: Pupils are equal, round, and reactive to light.   Neck:      Thyroid: No thyromegaly.   Cardiovascular:      Rate and Rhythm: Normal rate and regular rhythm.      Heart sounds: Normal heart sounds.   Pulmonary:      Effort: Pulmonary effort is normal.      Breath sounds: Normal breath sounds. No wheezing.   Musculoskeletal:         General: Normal range of motion.      Cervical back: Normal  range of motion and neck supple.   Lymphadenopathy:      Cervical: No cervical adenopathy.   Skin:     General: Skin is warm and dry.   Neurological:      Mental Status: He is alert and oriented to person, place, and time.   Psychiatric:         Attention and Perception: Attention normal.         Mood and Affect: Mood normal.         Thought Content: Thought content normal.          ACE III MINI        Results Review:    I reviewed the patient's new clinical results.    CMP:  Lab Results   Component Value Date    BUN 22 01/03/2022    CREATININE 1.02 01/03/2022    EGFRIFNONA 71 01/03/2022    BCR 21.6 01/03/2022     (L) 01/03/2022    K 4.5 01/03/2022    CO2 28.1 01/03/2022    CALCIUM 9.3 01/03/2022    ALBUMIN 4.30 01/03/2022    BILITOT 0.4 01/03/2022    ALKPHOS 69 01/03/2022    AST 16 01/03/2022    ALT 19 01/03/2022     HbA1c:  No results found for: HGBA1C  Microalbumin:  Lab Results   Component Value Date    MICROALBUR <1.2 07/19/2021     Lipid Panel  Lab Results   Component Value Date    CHOL 216 (H) 01/03/2022    TRIG 49 01/03/2022    HDL 80 (H) 01/03/2022     (H) 01/03/2022    AST 16 01/03/2022    ALT 19 01/03/2022       Medication Review: Medications reviewed and noted  Patient Instructions   Problem List Items Addressed This Visit        Cardiac and Vasculature    Mixed hyperlipidemia (Chronic)    Overview     1/21/2022 Barbi Carlson MD    Continue regular exercise.  Continue to improve the low-fat low sugar diet.  Maintain weight loss.         Benign essential hypertension    Overview     1/21/2022 Barbi Carlson MD    Continue olmesartan daily and doxazosin every evening.           Relevant Medications    olmesartan (Benicar) 20 MG tablet    doxazosin (CARDURA) 4 MG tablet    Chronic atrial fibrillation    Overview     1/21/2022 Barbi Carlson MD    Continue anticoagulation with Coumadin and regular PT/INR checks.           Varicose veins of lower extremity    Overview     Varicose veins  with edema    1/21/2022 Barbi Carlson MD     Continue wearing support socks daily.              Coag and Thromboembolic    Anticoagulated on Coumadin    Overview     1/21/2022 Barbi Carlson MD    Continue regular PT/INR.            Other    Overweight with body mass index (BMI) of 28 to 28.9 in adult (Chronic)    Overview     1/21/2022 Barbi Carlson MD    He has lost 20 pounds over the last 6 months.      Continue regular exercise and walking.  Continue low-fat low sugar diet.             Other Visit Diagnoses     Screening for AAA (abdominal aortic aneurysm)    -  Primary    Relevant Orders    [place] aaa screen limited             Diagnosis Plan   1. Screening for AAA (abdominal aortic aneurysm)  [place] aaa screen limited   2. Benign essential hypertension     3. Chronic atrial fibrillation     4. Mixed hyperlipidemia     5. Anticoagulated on Coumadin     6. Overweight with body mass index (BMI) of 28 to 28.9 in adult     7. Varicose veins of bilateral lower extremities with other complications             Plan of care reviewed with patient at the conclusion of today's visit. Education was provided regarding diagnosis, management, and any prescribed or recommended OTC medications.Patient verbalizes understanding of and agreement with management plan.         Barbi Carlson MD

## 2022-01-23 ENCOUNTER — PATIENT MESSAGE (OUTPATIENT)
Dept: INTERNAL MEDICINE | Facility: CLINIC | Age: 77
End: 2022-01-23

## 2022-01-24 RX ORDER — WARFARIN SODIUM 5 MG/1
TABLET ORAL
Qty: 90 TABLET | Refills: 0 | Status: SHIPPED | OUTPATIENT
Start: 2022-01-24 | End: 2022-04-25

## 2022-01-24 RX ORDER — WARFARIN SODIUM 4 MG/1
TABLET ORAL
Qty: 64 TABLET | Refills: 0 | Status: SHIPPED | OUTPATIENT
Start: 2022-01-24 | End: 2022-01-24 | Stop reason: SDUPTHER

## 2022-01-24 RX ORDER — WARFARIN SODIUM 4 MG/1
TABLET ORAL
Qty: 64 TABLET | Refills: 0 | Status: SHIPPED | OUTPATIENT
Start: 2022-01-24 | End: 2022-02-09 | Stop reason: SDUPTHER

## 2022-01-24 NOTE — TELEPHONE ENCOUNTER
From: Uriel Valverde  To: Barbi Carlson MD  Sent: 1/23/2022 4:10 AM EST  Subject: prescription refill    Within three weeks, I will need 90-day refills for Warfarin 4 mg and Warfarin 5 mg. Thank you.    Uriel Valverde

## 2022-01-24 NOTE — TELEPHONE ENCOUNTER
Rx Refill Note  Requested Prescriptions     Pending Prescriptions Disp Refills   • warfarin (COUMADIN) 4 MG tablet 64 tablet 0     Sig: TAKE 10MG DAILY FOR 2 DAYS A WEEK AND 9MG DAILY FOR 5 DAYS A WEEK   • warfarin (COUMADIN) 5 MG tablet 90 tablet 0     Sig: TAKE 10MG FOR 2 DAYS A WEEK AND 9MG FOR 5 DAYS A WEEK      Last INR 1/3/22  Last office visit with prescribing clinician: 1/21/2022      Next office visit with prescribing clinician: 7/22/2022            Pearl Loomis RN  01/24/22, 09:02 EST

## 2022-01-31 ENCOUNTER — HOSPITAL ENCOUNTER (OUTPATIENT)
Dept: ULTRASOUND IMAGING | Facility: HOSPITAL | Age: 77
Discharge: HOME | End: 2022-01-31

## 2022-01-31 ENCOUNTER — LAB (OUTPATIENT)
Dept: LAB | Facility: HOSPITAL | Age: 77
End: 2022-01-31

## 2022-01-31 DIAGNOSIS — Z79.01 ANTICOAGULATED ON COUMADIN: ICD-10-CM

## 2022-01-31 DIAGNOSIS — Z51.81 ENCOUNTER FOR THERAPEUTIC DRUG MONITORING: Primary | ICD-10-CM

## 2022-01-31 DIAGNOSIS — Z51.81 ENCOUNTER FOR THERAPEUTIC DRUG MONITORING: ICD-10-CM

## 2022-01-31 DIAGNOSIS — Z13.6 SCREENING FOR AAA (ABDOMINAL AORTIC ANEURYSM): ICD-10-CM

## 2022-01-31 LAB
INR PPP: 2.13 (ref 0.85–1.16)
PROTHROMBIN TIME: 23 SECONDS (ref 11.4–14.4)

## 2022-01-31 PROCEDURE — 93793 ANTICOAG MGMT PT WARFARIN: CPT | Performed by: INTERNAL MEDICINE

## 2022-01-31 PROCEDURE — 85610 PROTHROMBIN TIME: CPT

## 2022-01-31 PROCEDURE — 76706 US ABDL AORTA SCREEN AAA: CPT

## 2022-02-09 ENCOUNTER — PATIENT MESSAGE (OUTPATIENT)
Dept: INTERNAL MEDICINE | Facility: CLINIC | Age: 77
End: 2022-02-09

## 2022-02-09 RX ORDER — WARFARIN SODIUM 4 MG/1
TABLET ORAL
Qty: 90 TABLET | Refills: 1 | Status: SHIPPED | OUTPATIENT
Start: 2022-02-09 | End: 2022-07-22 | Stop reason: SDUPTHER

## 2022-02-09 NOTE — TELEPHONE ENCOUNTER
From: Uriel Valverde  To: Barbi Carlson MD  Sent: 2/9/2022 7:53 AM EST  Subject: Refill needed    I need a 90-day refill with 3 refills of Warfarin 4 mg. I have just 5 doses remaining.    Uriel Valverde

## 2022-02-24 ENCOUNTER — LAB (OUTPATIENT)
Dept: LAB | Facility: HOSPITAL | Age: 77
End: 2022-02-24

## 2022-02-24 DIAGNOSIS — Z51.81 ENCOUNTER FOR THERAPEUTIC DRUG MONITORING: ICD-10-CM

## 2022-02-24 DIAGNOSIS — Z79.01 ANTICOAGULATED ON COUMADIN: ICD-10-CM

## 2022-02-24 LAB
INR PPP: 2.38 (ref 0.85–1.16)
PROTHROMBIN TIME: 25 SECONDS (ref 11.4–14.4)

## 2022-02-24 PROCEDURE — 93793 ANTICOAG MGMT PT WARFARIN: CPT | Performed by: INTERNAL MEDICINE

## 2022-02-24 PROCEDURE — 85610 PROTHROMBIN TIME: CPT

## 2022-03-24 ENCOUNTER — LAB (OUTPATIENT)
Dept: LAB | Facility: HOSPITAL | Age: 77
End: 2022-03-24

## 2022-03-24 DIAGNOSIS — Z51.81 ENCOUNTER FOR THERAPEUTIC DRUG MONITORING: Primary | ICD-10-CM

## 2022-03-24 DIAGNOSIS — Z79.01 ANTICOAGULATED ON COUMADIN: ICD-10-CM

## 2022-03-24 LAB
INR PPP: 2.02 (ref 0.85–1.16)
PROTHROMBIN TIME: 22.1 SECONDS (ref 11.4–14.4)

## 2022-03-24 PROCEDURE — 85610 PROTHROMBIN TIME: CPT

## 2022-03-24 PROCEDURE — 93793 ANTICOAG MGMT PT WARFARIN: CPT | Performed by: INTERNAL MEDICINE

## 2022-04-21 ENCOUNTER — LAB (OUTPATIENT)
Dept: LAB | Facility: HOSPITAL | Age: 77
End: 2022-04-21

## 2022-04-21 DIAGNOSIS — Z79.01 ANTICOAGULATED ON COUMADIN: ICD-10-CM

## 2022-04-21 DIAGNOSIS — Z51.81 ENCOUNTER FOR THERAPEUTIC DRUG MONITORING: ICD-10-CM

## 2022-04-21 LAB
INR PPP: 2.53 (ref 0.84–1.13)
PROTHROMBIN TIME: 27.4 SECONDS (ref 11.4–14.4)

## 2022-04-21 PROCEDURE — 93793 ANTICOAG MGMT PT WARFARIN: CPT | Performed by: INTERNAL MEDICINE

## 2022-04-21 PROCEDURE — 85610 PROTHROMBIN TIME: CPT

## 2022-04-25 RX ORDER — WARFARIN SODIUM 5 MG/1
TABLET ORAL
Qty: 90 TABLET | Refills: 0 | Status: SHIPPED | OUTPATIENT
Start: 2022-04-25 | End: 2022-07-22 | Stop reason: SDUPTHER

## 2022-04-25 NOTE — TELEPHONE ENCOUNTER
Rx Refill Note  Requested Prescriptions     Pending Prescriptions Disp Refills   • warfarin (COUMADIN) 5 MG tablet [Pharmacy Med Name: WARFARIN SODIUM 5 MG TABLET] 90 tablet 0     Sig: TAKE 2 TABLETS BY MOUTH FOR 2 DAYS A WEEK AND 9MG FOR 5 DAYS A WEEK     Last INR:  4/21/21  Last office visit with prescribing clinician:  1/21/22  Next office visit with prescribing clinician: 7/22/22           Pearl Loomis RN  04/25/22, 11:47 EDT

## 2022-05-19 ENCOUNTER — LAB (OUTPATIENT)
Dept: LAB | Facility: HOSPITAL | Age: 77
End: 2022-05-19

## 2022-05-19 DIAGNOSIS — Z51.81 ENCOUNTER FOR THERAPEUTIC DRUG MONITORING: Primary | ICD-10-CM

## 2022-05-19 DIAGNOSIS — Z79.01 ANTICOAGULATED ON COUMADIN: ICD-10-CM

## 2022-05-19 LAB
INR PPP: 2.32 (ref 0.84–1.13)
PROTHROMBIN TIME: 25.6 SECONDS (ref 11.4–14.4)

## 2022-05-19 PROCEDURE — 93793 ANTICOAG MGMT PT WARFARIN: CPT | Performed by: INTERNAL MEDICINE

## 2022-05-19 PROCEDURE — 85610 PROTHROMBIN TIME: CPT

## 2022-06-10 ENCOUNTER — LAB (OUTPATIENT)
Dept: LAB | Facility: HOSPITAL | Age: 77
End: 2022-06-10

## 2022-06-10 DIAGNOSIS — Z51.81 ENCOUNTER FOR THERAPEUTIC DRUG MONITORING: Primary | ICD-10-CM

## 2022-06-10 DIAGNOSIS — Z79.01 ANTICOAGULATED ON COUMADIN: ICD-10-CM

## 2022-06-10 LAB
INR PPP: 2.41 (ref 0.84–1.13)
PROTHROMBIN TIME: 26.4 SECONDS (ref 11.4–14.4)

## 2022-06-10 PROCEDURE — 85610 PROTHROMBIN TIME: CPT

## 2022-06-10 PROCEDURE — 93793 ANTICOAG MGMT PT WARFARIN: CPT | Performed by: INTERNAL MEDICINE

## 2022-07-14 ENCOUNTER — PATIENT MESSAGE (OUTPATIENT)
Dept: INTERNAL MEDICINE | Facility: CLINIC | Age: 77
End: 2022-07-14

## 2022-07-14 DIAGNOSIS — E78.2 MIXED HYPERLIPIDEMIA: ICD-10-CM

## 2022-07-14 DIAGNOSIS — R73.9 HYPERGLYCEMIA: ICD-10-CM

## 2022-07-14 DIAGNOSIS — R00.1 BRADYCARDIA: Primary | ICD-10-CM

## 2022-07-14 DIAGNOSIS — I10 BENIGN ESSENTIAL HYPERTENSION: ICD-10-CM

## 2022-07-14 DIAGNOSIS — R53.83 OTHER FATIGUE: ICD-10-CM

## 2022-07-14 NOTE — TELEPHONE ENCOUNTER
From: Uriel Valverde  To: Barbi Carlson MD  Sent: 7/14/2022 4:42 PM EDT  Subject: labs for 7/22/2022 appointment    Please send the lab work over to [facility] so I can take care of this before my appointment on 7/22/2022. Thank you.    Uriel Valverde

## 2022-07-19 ENCOUNTER — LAB (OUTPATIENT)
Dept: LAB | Facility: HOSPITAL | Age: 77
End: 2022-07-19

## 2022-07-19 DIAGNOSIS — Z51.81 ENCOUNTER FOR THERAPEUTIC DRUG MONITORING: ICD-10-CM

## 2022-07-19 DIAGNOSIS — R53.83 OTHER FATIGUE: ICD-10-CM

## 2022-07-19 DIAGNOSIS — I10 BENIGN ESSENTIAL HYPERTENSION: ICD-10-CM

## 2022-07-19 DIAGNOSIS — R00.1 BRADYCARDIA: ICD-10-CM

## 2022-07-19 DIAGNOSIS — R73.9 HYPERGLYCEMIA: ICD-10-CM

## 2022-07-19 DIAGNOSIS — Z79.01 ANTICOAGULATED ON COUMADIN: ICD-10-CM

## 2022-07-19 DIAGNOSIS — E78.2 MIXED HYPERLIPIDEMIA: ICD-10-CM

## 2022-07-19 LAB
ALBUMIN SERPL-MCNC: 4.2 G/DL (ref 3.5–5.2)
ALBUMIN UR-MCNC: <1.2 MG/DL
ALBUMIN/GLOB SERPL: 1.9 G/DL
ALP SERPL-CCNC: 59 U/L (ref 39–117)
ALT SERPL W P-5'-P-CCNC: 19 U/L (ref 1–41)
ANION GAP SERPL CALCULATED.3IONS-SCNC: 8 MMOL/L (ref 5–15)
AST SERPL-CCNC: 24 U/L (ref 1–40)
BASOPHILS # BLD AUTO: 0.03 10*3/MM3 (ref 0–0.2)
BASOPHILS NFR BLD AUTO: 0.5 % (ref 0–1.5)
BILIRUB SERPL-MCNC: 0.6 MG/DL (ref 0–1.2)
BUN SERPL-MCNC: 26 MG/DL (ref 8–23)
BUN/CREAT SERPL: 23.6 (ref 7–25)
CALCIUM SPEC-SCNC: 9.5 MG/DL (ref 8.6–10.5)
CHLORIDE SERPL-SCNC: 105 MMOL/L (ref 98–107)
CHOLEST SERPL-MCNC: 207 MG/DL (ref 0–200)
CO2 SERPL-SCNC: 26 MMOL/L (ref 22–29)
CREAT SERPL-MCNC: 1.1 MG/DL (ref 0.76–1.27)
CREAT UR-MCNC: 28.2 MG/DL
DEPRECATED RDW RBC AUTO: 45.8 FL (ref 37–54)
EGFRCR SERPLBLD CKD-EPI 2021: 69.6 ML/MIN/1.73
EOSINOPHIL # BLD AUTO: 0.22 10*3/MM3 (ref 0–0.4)
EOSINOPHIL NFR BLD AUTO: 3.5 % (ref 0.3–6.2)
ERYTHROCYTE [DISTWIDTH] IN BLOOD BY AUTOMATED COUNT: 13.1 % (ref 12.3–15.4)
GLOBULIN UR ELPH-MCNC: 2.2 GM/DL
GLUCOSE SERPL-MCNC: 96 MG/DL (ref 65–99)
HBA1C MFR BLD: 5.8 % (ref 4.8–5.6)
HCT VFR BLD AUTO: 37.2 % (ref 37.5–51)
HDLC SERPL-MCNC: 74 MG/DL (ref 40–60)
HGB BLD-MCNC: 12.9 G/DL (ref 13–17.7)
IMM GRANULOCYTES # BLD AUTO: 0.02 10*3/MM3 (ref 0–0.05)
IMM GRANULOCYTES NFR BLD AUTO: 0.3 % (ref 0–0.5)
INR PPP: 2.46 (ref 0.84–1.13)
LDLC SERPL CALC-MCNC: 123 MG/DL (ref 0–100)
LDLC/HDLC SERPL: 1.64 {RATIO}
LYMPHOCYTES # BLD AUTO: 3.18 10*3/MM3 (ref 0.7–3.1)
LYMPHOCYTES NFR BLD AUTO: 51 % (ref 19.6–45.3)
MAGNESIUM SERPL-MCNC: 2.2 MG/DL (ref 1.6–2.4)
MCH RBC QN AUTO: 34.1 PG (ref 26.6–33)
MCHC RBC AUTO-ENTMCNC: 34.7 G/DL (ref 31.5–35.7)
MCV RBC AUTO: 98.4 FL (ref 79–97)
MICROALBUMIN/CREAT UR: NORMAL MG/G{CREAT}
MONOCYTES # BLD AUTO: 0.32 10*3/MM3 (ref 0.1–0.9)
MONOCYTES NFR BLD AUTO: 5.1 % (ref 5–12)
NEUTROPHILS NFR BLD AUTO: 2.47 10*3/MM3 (ref 1.7–7)
NEUTROPHILS NFR BLD AUTO: 39.6 % (ref 42.7–76)
NRBC BLD AUTO-RTO: 0 /100 WBC (ref 0–0.2)
PLATELET # BLD AUTO: 149 10*3/MM3 (ref 140–450)
PMV BLD AUTO: 11.4 FL (ref 6–12)
POTASSIUM SERPL-SCNC: 4.8 MMOL/L (ref 3.5–5.2)
PROT SERPL-MCNC: 6.4 G/DL (ref 6–8.5)
PROTHROMBIN TIME: 26.8 SECONDS (ref 11.4–14.4)
RBC # BLD AUTO: 3.78 10*6/MM3 (ref 4.14–5.8)
SODIUM SERPL-SCNC: 139 MMOL/L (ref 136–145)
TRIGL SERPL-MCNC: 57 MG/DL (ref 0–150)
TSH SERPL DL<=0.05 MIU/L-ACNC: 2.25 UIU/ML (ref 0.27–4.2)
VLDLC SERPL-MCNC: 10 MG/DL (ref 5–40)
WBC NRBC COR # BLD: 6.24 10*3/MM3 (ref 3.4–10.8)

## 2022-07-19 PROCEDURE — 83735 ASSAY OF MAGNESIUM: CPT

## 2022-07-19 PROCEDURE — 85025 COMPLETE CBC W/AUTO DIFF WBC: CPT

## 2022-07-19 PROCEDURE — 84443 ASSAY THYROID STIM HORMONE: CPT

## 2022-07-19 PROCEDURE — 82570 ASSAY OF URINE CREATININE: CPT

## 2022-07-19 PROCEDURE — 83036 HEMOGLOBIN GLYCOSYLATED A1C: CPT

## 2022-07-19 PROCEDURE — 85610 PROTHROMBIN TIME: CPT

## 2022-07-19 PROCEDURE — 80053 COMPREHEN METABOLIC PANEL: CPT

## 2022-07-19 PROCEDURE — 80061 LIPID PANEL: CPT

## 2022-07-19 PROCEDURE — 82043 UR ALBUMIN QUANTITATIVE: CPT

## 2022-07-22 ENCOUNTER — OFFICE VISIT (OUTPATIENT)
Dept: INTERNAL MEDICINE | Facility: CLINIC | Age: 77
End: 2022-07-22

## 2022-07-22 VITALS
BODY MASS INDEX: 28.86 KG/M2 | DIASTOLIC BLOOD PRESSURE: 84 MMHG | HEART RATE: 60 BPM | OXYGEN SATURATION: 99 % | TEMPERATURE: 98.7 F | WEIGHT: 217.8 LBS | SYSTOLIC BLOOD PRESSURE: 126 MMHG | HEIGHT: 73 IN

## 2022-07-22 DIAGNOSIS — Z87.39 PERSONAL HISTORY OF GOUT: Chronic | ICD-10-CM

## 2022-07-22 DIAGNOSIS — Z23 NEED FOR VACCINATION: ICD-10-CM

## 2022-07-22 DIAGNOSIS — E78.2 MIXED HYPERLIPIDEMIA: ICD-10-CM

## 2022-07-22 DIAGNOSIS — Z12.5 PROSTATE CANCER SCREENING: ICD-10-CM

## 2022-07-22 DIAGNOSIS — Z00.00 MEDICARE ANNUAL WELLNESS VISIT, SUBSEQUENT: Primary | ICD-10-CM

## 2022-07-22 DIAGNOSIS — I48.20 CHRONIC ATRIAL FIBRILLATION: ICD-10-CM

## 2022-07-22 DIAGNOSIS — D64.9 MILD ANEMIA: ICD-10-CM

## 2022-07-22 DIAGNOSIS — H90.3 SENSORINEURAL HEARING LOSS (SNHL) OF BOTH EARS: ICD-10-CM

## 2022-07-22 DIAGNOSIS — I10 BENIGN ESSENTIAL HYPERTENSION: ICD-10-CM

## 2022-07-22 DIAGNOSIS — Z00.00 ANNUAL PHYSICAL EXAM: ICD-10-CM

## 2022-07-22 DIAGNOSIS — Z79.01 ANTICOAGULATED ON COUMADIN: ICD-10-CM

## 2022-07-22 DIAGNOSIS — E66.3 OVERWEIGHT WITH BODY MASS INDEX (BMI) OF 28 TO 28.9 IN ADULT: Chronic | ICD-10-CM

## 2022-07-22 PROCEDURE — G0439 PPPS, SUBSEQ VISIT: HCPCS | Performed by: INTERNAL MEDICINE

## 2022-07-22 PROCEDURE — 1159F MED LIST DOCD IN RCRD: CPT | Performed by: INTERNAL MEDICINE

## 2022-07-22 PROCEDURE — 90677 PCV20 VACCINE IM: CPT | Performed by: INTERNAL MEDICINE

## 2022-07-22 PROCEDURE — G0009 ADMIN PNEUMOCOCCAL VACCINE: HCPCS | Performed by: INTERNAL MEDICINE

## 2022-07-22 PROCEDURE — 1170F FXNL STATUS ASSESSED: CPT | Performed by: INTERNAL MEDICINE

## 2022-07-22 PROCEDURE — 99397 PER PM REEVAL EST PAT 65+ YR: CPT | Performed by: INTERNAL MEDICINE

## 2022-07-22 RX ORDER — WARFARIN SODIUM 10 MG/1
TABLET ORAL
Qty: 30 TABLET | Refills: 5 | Status: SHIPPED | OUTPATIENT
Start: 2022-07-22

## 2022-07-22 RX ORDER — OLMESARTAN MEDOXOMIL 20 MG/1
20 TABLET ORAL DAILY
Qty: 90 TABLET | Refills: 3 | Status: SHIPPED | OUTPATIENT
Start: 2022-07-22

## 2022-07-22 RX ORDER — WARFARIN SODIUM 4 MG/1
TABLET ORAL
Qty: 90 TABLET | Refills: 1 | Status: SHIPPED | OUTPATIENT
Start: 2022-07-22 | End: 2023-03-29

## 2022-07-22 RX ORDER — DOXAZOSIN MESYLATE 4 MG/1
4 TABLET ORAL NIGHTLY
Qty: 90 TABLET | Refills: 3 | Status: SHIPPED | OUTPATIENT
Start: 2022-07-22

## 2022-07-22 RX ORDER — WARFARIN SODIUM 5 MG/1
TABLET ORAL
Qty: 90 TABLET | Refills: 0 | Status: SHIPPED | OUTPATIENT
Start: 2022-07-22 | End: 2023-02-14 | Stop reason: SDUPTHER

## 2022-07-22 NOTE — ASSESSMENT & PLAN NOTE
Continue to decrease fats and carbohydrates in the diet. Continue regular walking and golfing. Eat small portions at mealtime.

## 2022-07-22 NOTE — ASSESSMENT & PLAN NOTE
He is up to date on all vaccinations. Pneumovax 20 valent was given today. He is up to date on colonoscopy.

## 2022-07-22 NOTE — PROGRESS NOTES
The ABCs of the Annual Wellness Visit  Initial Medicare Wellness Visit    Chief Complaint   Patient presents with   • Medicare Wellness-subsequent   • Hypertension       Subjective   History of Present Illness:  Uriel Valverde is a 77 y.o. male who presents for an Initial Medicare Wellness Visit.    The patient presents today for an annual wellness visit.    Health maintenance  The patient states he feels well and has not had any recent falls. He is still working but is not full-time. The patient saw Dr. Barron, ophthalmology, in 2021 and was told he would eventually need cataract surgery. He reports daily exercise. He says his hearing aids have helped greatly and denies any chest pains or palpitations with exertion. He denies any swelling and shares he always wears support socks. His blood pressure today is 126/84 mmHg.  He denies any abdominal pain, heartburn, indigestion or fatigue. He shares that he eats red meat approximately once a week.  He eats a lot of fish and chicken and vegetables.  He denies any noticed medication side effects from his Olmesartan, allopurinol or doxazosin. He denies any knee pain. He is fully vaccinated for COVID-19 and has received 2 booster injections.     CHRONIC CONDITIONS:    HEALTH RISK ASSESSMENT    Recent Hospitalizations:  No hospitalization(s) within the last year.    Current Medical Providers:  Patient Care Team:  Barbi Carlson MD as PCP - General (Internal Medicine)    Smoking Status:  Social History     Tobacco Use   Smoking Status Former Smoker   • Packs/day: 0.50   • Years: 22.00   • Pack years: 11.00   • Types: Cigarettes   • Start date: 1966   • Quit date: 1978   • Years since quittin.6   Smokeless Tobacco Never Used   Tobacco Comment    no passive smoke exposure, quit 79       Alcohol Consumption:  Social History     Substance and Sexual Activity   Alcohol Use Yes   • Alcohol/week: 2.0 standard drinks   • Types: 2 Glasses of wine per week     Comment: daily       Depression Screen:   PHQ-2/PHQ-9 Depression Screening 7/22/2022   Retired PHQ-9 Total Score -   Retired Total Score -   Little Interest or Pleasure in Doing Things 0-->not at all   Feeling Down, Depressed or Hopeless 0-->not at all   PHQ-9: Brief Depression Severity Measure Score 0       Fall Risk Screen:  JONNY Fall Risk Assessment was completed, and patient is at LOW risk for falls.Assessment completed on:7/22/2022    Health Habits and Functional and Cognitive Screening:  Functional & Cognitive Status 7/22/2022   Do you have difficulty preparing food and eating? No   Do you have difficulty bathing yourself, getting dressed or grooming yourself? No   Do you have difficulty using the toilet? No   Do you have difficulty moving around from place to place? No   Do you have trouble with steps or getting out of a bed or a chair? No   Current Diet Well Balanced Diet   Dental Exam Up to date   Eye Exam Up to date   Exercise (times per week) 4 times per week   Current Exercises Include Walking   Current Exercise Activities Include -   Do you need help using the phone?  No   Are you deaf or do you have serious difficulty hearing?  No   Do you need help with transportation? No   Do you need help shopping? No   Do you need help preparing meals?  No   Do you need help with housework?  No   Do you need help with laundry? No   Do you need help taking your medications? No   Do you need help managing money? No   Do you ever drive or ride in a car without wearing a seat belt? No   Have you felt unusual stress, anger or loneliness in the last month? No   Who do you live with? Spouse   If you need help, do you have trouble finding someone available to you? No   Have you been bothered in the last four weeks by sexual problems? No   Do you have difficulty concentrating, remembering or making decisions? No       Does the patient have evidence of cognitive impairment? No    Asprin use counseling:Does not need ASA  (and currently is not on it)    Age-appropriate Screening Schedule:  Refer to the list below for future screening recommendations based on patient's age, sex and/or medical conditions. Orders for these recommended tests are listed in the plan section. The patient has been provided with a written plan.    Health Maintenance   Topic Date Due   • INFLUENZA VACCINE  10/01/2022   • LIPID PANEL  07/19/2023   • TDAP/TD VACCINES (4 - Td or Tdap) 01/17/2030   • ZOSTER VACCINE  Completed        The following portions of the patient's history were reviewed and updated as appropriate: allergies, current medications, past family history, past medical history, past social history, past surgical history and problem list.    Outpatient Medications Prior to Visit   Medication Sig Dispense Refill   • allopurinol (ZYLOPRIM) 100 MG tablet Take 1 tablet by mouth Daily. 90 tablet 3   • cholecalciferol (Vitamin D, Cholecalciferol,) 25 MCG (1000 UT) tablet Take 1 tablet by mouth Daily. 60 tablet 5   • fexofenadine (ALLEGRA) 180 MG tablet Take 180 mg by mouth Daily.     • folic acid (FOLVITE) 1 MG tablet Take 1 tablet by mouth Daily.     • vitamin B-12 (CYANOCOBALAMIN) 500 MCG tablet Take 1 tablet by mouth Daily. 90 tablet 1   • doxazosin (CARDURA) 4 MG tablet Take 1 tablet by mouth Every Night. 90 tablet 3   • olmesartan (Benicar) 20 MG tablet Take 1 tablet by mouth Daily. 90 tablet 3   • warfarin (COUMADIN) 10 MG tablet Take 10 mg on Thursdays and Sundays.  Take 9 mg all other days. 30 tablet 5   • warfarin (COUMADIN) 4 MG tablet TAKE 10MG DAILY FOR 2 DAYS A WEEK AND 9MG DAILY FOR 5 DAYS A WEEK 90 tablet 1   • warfarin (COUMADIN) 5 MG tablet TAKE 2 TABLETS BY MOUTH FOR 2 DAYS A WEEK AND 9MG FOR 5 DAYS A WEEK 90 tablet 0     No facility-administered medications prior to visit.       Patient Active Problem List   Diagnosis   • Benign essential hypertension   • Chronic atrial fibrillation   • Allergic rhinitis   • Primary insomnia   • Other  "fatigue   • Varicose veins of lower extremity   • Bilateral hearing loss   • Allergy to mold   • Allergy to wheat   • Vitamin D deficiency   • Mixed hyperlipidemia   • Anticoagulated on Coumadin   • Erectile dysfunction   • Benign prostatic hyperplasia without urinary obstruction   • Adverse effect of anticoagulant   • Chronic right shoulder pain   • Personal history of gout   • Overweight with body mass index (BMI) of 28 to 28.9 in adult   • Encounter for therapeutic drug monitoring   • Annual physical exam   • Medicare annual wellness visit, subsequent   • Bradycardia   • Mild anemia       Advanced Care Planning:  ACP discussion was held with the patient during this visit. Patient has an advance directive in EMR which is still valid.     Review of Systems   Constitutional: Negative for chills, fatigue and fever.   HENT: Negative for congestion, ear pain and sinus pressure.    Respiratory: Negative for cough, chest tightness, shortness of breath and wheezing.    Cardiovascular: Negative for chest pain and palpitations.   Gastrointestinal: Negative for abdominal pain, blood in stool and constipation.   Skin: Negative for color change.   Allergic/Immunologic: Negative for environmental allergies.   Neurological: Negative for dizziness, speech difficulty and headaches.   Psychiatric/Behavioral: Negative for confusion. The patient is not nervous/anxious.        Compared to one year ago, the patient feels his physical health is the same.  Compared to one year ago, the patient feels his mental health is the same.    Reviewed chart for potential of high risk medication in the elderly: yes  Reviewed chart for potential of harmful drug interactions in the elderly:yes    Objective         Vitals:    07/22/22 0834   BP: 126/84   BP Location: Left arm   Patient Position: Sitting   Cuff Size: Adult   Pulse: 60   Temp: 98.7 °F (37.1 °C)   TempSrc: Infrared   SpO2: 99%   Weight: 98.8 kg (217 lb 12.8 oz)   Height: 185.4 cm (73\") "   PainSc: 0-No pain     BMI is >= 25 and <30. (Overweight) The following options were offered after discussion;: weight loss educational material (shared in after visit summary), exercise counseling/recommendations and nutrition counseling/recommendations    Body mass index is 28.74 kg/m².  Discussed the patient's BMI with him. The BMI is above average; BMI management plan is completed.    Physical Exam  Vitals and nursing note reviewed.   Constitutional:       Appearance: He is well-developed.   HENT:      Head: Normocephalic.   Eyes:      Conjunctiva/sclera: Conjunctivae normal.      Pupils: Pupils are equal, round, and reactive to light.   Neck:      Thyroid: No thyromegaly.   Cardiovascular:      Rate and Rhythm: Normal rate and regular rhythm.      Heart sounds: Normal heart sounds.   Pulmonary:      Effort: Pulmonary effort is normal.      Breath sounds: Normal breath sounds. No wheezing.   Abdominal:      General: Bowel sounds are normal.      Palpations: Abdomen is soft.      Tenderness: There is no abdominal tenderness.   Musculoskeletal:         General: No tenderness. Normal range of motion.      Cervical back: Normal range of motion and neck supple.   Lymphadenopathy:      Cervical: No cervical adenopathy.   Skin:     General: Skin is warm and dry.      Findings: No rash.   Neurological:      Mental Status: He is alert and oriented to person, place, and time.      Cranial Nerves: No cranial nerve deficit.      Sensory: No sensory deficit.      Coordination: Coordination normal.      Gait: Gait normal.   Psychiatric:         Mood and Affect: Mood normal.         Speech: Speech normal.         Behavior: Behavior normal.         Thought Content: Thought content normal.         Judgment: Judgment normal.      Comments: Normal attention         Lab Results   Component Value Date    TRIG 57 07/19/2022    HDL 74 (H) 07/19/2022     (H) 07/19/2022    VLDL 10 07/19/2022    HGBA1C 5.80 (H) 07/19/2022         Assessment & Plan   Medicare Risks and Personalized Health Plan  CMS Preventative Services Quick Reference  Cardiovascular risk  Fall Risk  Obesity/Overweight     The above risks/problems have been discussed with the patient.  Pertinent information has been shared with the patient in the After Visit Summary.  Follow up plans and orders are seen below in the Assessment/Plan Section.  Patient Instructions   Problem List Items Addressed This Visit        Cardiac and Vasculature    Mixed hyperlipidemia (Chronic)    Overview     Lifestyle control.           Current Assessment & Plan     Continue to decrease fats and carbohydrates in the diet. Continue regular walking and golfing. Eat small portions at mealtime.           Relevant Orders    Homocysteine    Benign essential hypertension    Overview     Taking olmesartan daily and doxazosin every evening.             Current Assessment & Plan     Continue Olmesartan daily and doxazosin every evening. Continue to avoid salt in the diet.           Relevant Medications    olmesartan (Benicar) 20 MG tablet    doxazosin (CARDURA) 4 MG tablet    Chronic atrial fibrillation    Overview     Anticoagulation with Coumadin and regular PT/INR checks.             Current Assessment & Plan     Continue Coumadin with regular PT/INR checks.              Coag and Thromboembolic    Anticoagulated on Coumadin    Overview      regular PT/INR.              ENT    Bilateral hearing loss    Overview     Wears hearing aids.              Health Encounters    Annual physical exam    Medicare annual wellness visit, subsequent - Primary    Current Assessment & Plan     He is up to date on all vaccinations. Pneumovax 20 valent was given today. He is up to date on colonoscopy.              Hematology and Neoplasia    Mild anemia    Current Assessment & Plan     We will check iron levels, and B12, and homocysteine levels. He denies any gastrointestinal symptoms.           Relevant Medications     folic acid (FOLVITE) 1 MG tablet    vitamin B-12 (CYANOCOBALAMIN) 500 MCG tablet    Other Relevant Orders    Iron Profile    Ferritin    Vitamin B12       Musculoskeletal and Injuries    Personal history of gout (Chronic)    Current Assessment & Plan     Continue allopurinol daily. Continue low fat diet.              Other    Overweight with body mass index (BMI) of 28 to 28.9 in adult (Chronic)    Overview     He lost 20 pounds over the  6 months from 7/21 to 1/22.  He has maintained that loss.             Current Assessment & Plan     Continue to decrease fats and carbohydrates in the diet. Continue regular walking and golfing. Eat small portions at mealtime.             Other Visit Diagnoses     Prostate cancer screening        Relevant Orders    PSA Screen    Need for vaccination        Relevant Orders    Pneumococcal Conjugate Vaccine 20-Valent All (Completed)           Follow Up:  No follow-ups on file.     An After Visit Summary and PPPS were given to the patient.       Transcribed from ambient dictation for Barbi Carlson MD by Boyd Elizondo.  07/22/22   11:41 EDT    Patient verbalized consent to the visit recording.  I have personally performed the services described in this document as transcribed by the above individual, and it is both accurate and complete.  Barbi Carlson MD  7/24/2022  08:25 EDT

## 2022-07-22 NOTE — ASSESSMENT & PLAN NOTE
We will check iron levels, and B12, and homocysteine levels. He denies any gastrointestinal symptoms.

## 2022-07-24 NOTE — PATIENT INSTRUCTIONS
Patient Instructions   Problem List Items Addressed This Visit          Cardiac and Vasculature    Mixed hyperlipidemia (Chronic)    Overview     Lifestyle control.           Current Assessment & Plan     Continue to decrease fats and carbohydrates in the diet. Continue regular walking and golfing. Eat small portions at mealtime.           Relevant Orders    Homocysteine    Benign essential hypertension    Overview     Taking olmesartan daily and doxazosin every evening.             Current Assessment & Plan     Continue Olmesartan daily and doxazosin every evening. Continue to avoid salt in the diet.           Relevant Medications    olmesartan (Benicar) 20 MG tablet    doxazosin (CARDURA) 4 MG tablet    Chronic atrial fibrillation    Overview     Anticoagulation with Coumadin and regular PT/INR checks.             Current Assessment & Plan     Continue Coumadin with regular PT/INR checks.              Coag and Thromboembolic    Anticoagulated on Coumadin    Overview      regular PT/INR.              ENT    Bilateral hearing loss    Overview     Wears hearing aids.              Health Encounters    Annual physical exam    Medicare annual wellness visit, subsequent - Primary    Current Assessment & Plan     He is up to date on all vaccinations. Pneumovax 20 valent was given today. He is up to date on colonoscopy.              Hematology and Neoplasia    Mild anemia    Current Assessment & Plan     We will check iron levels, and B12, and homocysteine levels. He denies any gastrointestinal symptoms.           Relevant Medications    folic acid (FOLVITE) 1 MG tablet    vitamin B-12 (CYANOCOBALAMIN) 500 MCG tablet    Other Relevant Orders    Iron Profile    Ferritin    Vitamin B12       Musculoskeletal and Injuries    Personal history of gout (Chronic)    Current Assessment & Plan     Continue allopurinol daily. Continue low fat diet.              Other    Overweight with body mass index (BMI) of 28 to 28.9 in adult  (Chronic)    Overview     He lost 20 pounds over the  6 months from 7/21 to 1/22.  He has maintained that loss.             Current Assessment & Plan     Continue to decrease fats and carbohydrates in the diet. Continue regular walking and golfing. Eat small portions at mealtime.                 Other Visit Diagnoses       Prostate cancer screening        Relevant Orders    PSA Screen    Need for vaccination        Relevant Orders    Pneumococcal Conjugate Vaccine 20-Valent All (Completed)         BMI for Adults  What is BMI?  Body mass index (BMI) is a number that is calculated from a person's weight and height. BMI can help estimate how much of a person's weight is composed of fat. BMI does not measure body fat directly. Rather, it is an alternative to procedures that directly measure body fat, which can be difficult and expensive.  BMI can help identify people who may be at higher risk for certain medical problems.  What are BMI measurements used for?  BMI is used as a screening tool to identify possible weight problems. It helps determine whether a person is obese, overweight, a healthy weight, or underweight.  BMI is useful for:  Identifying a weight problem that may be related to a medical condition or may increase the risk for medical problems.  Promoting changes, such as changes in diet and exercise, to help reach a healthy weight. BMI screening can be repeated to see if these changes are working.  How is BMI calculated?  BMI involves measuring your weight in relation to your height. Both height and weight are measured, and the BMI is calculated from those numbers. This can be done either in English (U[place] or metric measurements. Note that charts and online BMI calculators are available to help you find your BMI quickly and easily without having to do these calculations yourself.  To calculate your BMI in English ([place] measurements:    Measure your weight in pounds (lb).  Multiply the number of pounds by  "703.  For example, for a person who weighs 180 lb, multiply that number by 703, which equals 126,540.  Measure your height in inches. Then multiply that number by itself to get a measurement called \"inches squared.\"  For example, for a person who is 70 inches tall, the \"inches squared\" measurement is 70 inches x 70 inches, which equals 4,900 inches squared.  Divide the total from step 2 (number of lb x 703) by the total from step 3 (inches squared): 126,540 ÷ 4,900 = 25.8. This is your BMI.    To calculate your BMI in metric measurements:  Measure your weight in kilograms (kg).  Measure your height in meters (m). Then multiply that number by itself to get a measurement called \"meters squared.\"  For example, for a person who is 1.75 m tall, the \"meters squared\" measurement is 1.75 m x 1.75 m, which is equal to 3.1 meters squared.  Divide the number of kilograms (your weight) by the meters squared number. In this example: 70 ÷ 3.1 = 22.6. This is your BMI.  What do the results mean?  BMI charts are used to identify whether you are underweight, normal weight, overweight, or obese. The following guidelines will be used:  Underweight: BMI less than 18.5.  Normal weight: BMI between 18.5 and 24.9.  Overweight: BMI between 25 and 29.9.  Obese: BMI of 30 or above.  Keep these notes in mind:  Weight includes both fat and muscle, so someone with a muscular build, such as an athlete, may have a BMI that is higher than 24.9. In cases like these, BMI is not an accurate measure of body fat.  To determine if excess body fat is the cause of a BMI of 25 or higher, further assessments may need to be done by a health care provider.  BMI is usually interpreted in the same way for men and women.  Where to find more information  For more information about BMI, including tools to quickly calculate your BMI, go to these websites:  Centers for Disease Control and Prevention: www.cdc.gov  American Heart Association: www.heart.org  National " Heart, Lung, and Blood [place]: www.nhlbi.nih.gov  Summary  Body mass index (BMI) is a number that is calculated from a person's weight and height.  BMI may help estimate how much of a person's weight is composed of fat. BMI can help identify those who may be at higher risk for certain medical problems.  BMI can be measured using English measurements or metric measurements.  BMI charts are used to identify whether you are underweight, normal weight, overweight, or obese.  This information is not intended to replace advice given to you by your health care provider. Make sure you discuss any questions you have with your health care provider.  Document Revised: 09/09/2020 Document Reviewed: 07/17/2020  [employer] Patient Education © 2021 [employer]  Calorie Counting for Weight Loss  Calories are units of energy. Your body needs a certain number of calories from food to keep going throughout the day. When you eat or drink more calories than your body needs, your body stores the extra calories mostly as fat. When you eat or drink fewer calories than your body needs, your body burns fat to get the energy it needs.  Calorie counting means keeping track of how many calories you eat and drink each day. Calorie counting can be helpful if you need to lose weight. If you eat fewer calories than your body needs, you should lose weight. Ask your health care provider what a healthy weight is for you.  For calorie counting to work, you will need to eat the right number of calories each day to lose a healthy amount of weight per week. A dietitian can help you figure out how many calories you need in a day and will suggest ways to reach your calorie goal.  A healthy amount of weight to lose each week is usually 1-2 lb (0.5-0.9 kg). This usually means that your daily calorie intake should be reduced by 500-750 calories.  Eating 1,200-1,500 calories a day can help most women lose weight.  Eating 1,500-1,800 calories a day can help  most men lose weight.  What do I need to know about calorie counting?  Work with your health care provider or dietitian to determine how many calories you should get each day. To meet your daily calorie goal, you will need to:  Find out how many calories are in each food that you would like to eat. Try to do this before you eat.  Decide how much of the food you plan to eat.  Keep a food log. Do this by writing down what you ate and how many calories it had.  To successfully lose weight, it is important to balance calorie counting with a healthy lifestyle that includes regular activity.  Where do I find calorie information?    The number of calories in a food can be found on a Nutrition Facts label. If a food does not have a Nutrition Facts label, try to look up the calories online or ask your dietitian for help.  Remember that calories are listed per serving. If you choose to have more than one serving of a food, you will have to multiply the calories per serving by the number of servings you plan to eat. For example, the label on a package of bread might say that a serving size is 1 slice and that there are 90 calories in a serving. If you eat 1 slice, you will have eaten 90 calories. If you eat 2 slices, you will have eaten 180 calories.  How do I keep a food log?  After each time that you eat, record the following in your food log as soon as possible:  What you ate. Be sure to include toppings, sauces, and other extras on the food.  How much you ate. This can be measured in cups, ounces, or number of items.  How many calories were in each food and drink.  The total number of calories in the food you ate.  Keep your food log near you, such as in a pocket-sized notebook or on an zuly or website on your mobile phone. Some programs will calculate calories for you and show you how many calories you have left to meet your daily goal.  What are some portion-control tips?  Know how many calories are in a serving. This  will help you know how many servings you can have of a certain food.  Use a measuring cup to measure serving sizes. You could also try weighing out portions on a kitchen scale. With time, you will be able to estimate serving sizes for some foods.  Take time to put servings of different foods on your favorite plates or in your favorite bowls and cups so you know what a serving looks like.  Try not to eat straight from a food's packaging, such as from a bag or box. Eating straight from the package makes it hard to see how much you are eating and can lead to overeating. Put the amount you would like to eat in a cup or on a plate to make sure you are eating the right portion.  Use smaller plates, glasses, and bowls for smaller portions and to prevent overeating.  Try not to multitask. For example, avoid watching TV or using your computer while eating. If it is time to eat, sit down at a table and enjoy your food. This will help you recognize when you are full. It will also help you be more mindful of what and how much you are eating.  What are tips for following this plan?  Reading food labels  Check the calorie count compared with the serving size. The serving size may be smaller than what you are used to eating.  Check the source of the calories. Try to choose foods that are high in protein, fiber, and vitamins, and low in saturated fat, trans fat, and sodium.  Shopping  Read nutrition labels while you shop. This will help you make healthy decisions about which foods to buy.  Pay attention to nutrition labels for low-fat or fat-free foods. These foods sometimes have the same number of calories or more calories than the full-fat versions. They also often have added sugar, starch, or salt to make up for flavor that was removed with the fat.  Make a grocery list of lower-calorie foods and stick to it.  Cooking  Try to cook your favorite foods in a healthier way. For example, try baking instead of frying.  Use low-fat  dairy products.  Meal planning  Use more fruits and vegetables. One-half of your plate should be fruits and vegetables.  Include lean proteins, such as chicken, turkey, and fish.  Lifestyle  Each week, aim to do one of the followin minutes of moderate exercise, such as walking.  75 minutes of vigorous exercise, such as running.  General information  Know how many calories are in the foods you eat most often. This will help you calculate calorie counts faster.  Find a way of tracking calories that works for you. Get creative. Try different apps or programs if writing down calories does not work for you.  What foods should I eat?    Eat nutritious foods. It is better to have a nutritious, high-calorie food, such as an avocado, than a food with few nutrients, such as a bag of potato chips.  Use your calories on foods and drinks that will fill you up and will not leave you hungry soon after eating.  Examples of foods that fill you up are nuts and nut butters, vegetables, lean proteins, and high-fiber foods such as whole grains. High-fiber foods are foods with more than 5 g of fiber per serving.  Pay attention to calories in drinks. Low-calorie drinks include water and unsweetened drinks.  The items listed above may not be a complete list of foods and beverages you can eat. Contact a dietitian for more information.  What foods should I limit?  Limit foods or drinks that are not good sources of vitamins, minerals, or protein or that are high in unhealthy fats. These include:  Candy.  Other sweets.  Sodas, specialty coffee drinks, alcohol, and juice.  The items listed above may not be a complete list of foods and beverages you should avoid. Contact a dietitian for more information.  How do I count calories when eating out?  Pay attention to portions. Often, portions are much larger when eating out. Try these tips to keep portions smaller:  Consider sharing a meal instead of getting your own.  If you get your own  meal, eat only half of it. Before you start eating, ask for a container and put half of your meal into it.  When available, consider ordering smaller portions from the menu instead of full portions.  Pay attention to your food and drink choices. Knowing the way food is cooked and what is included with the meal can help you eat fewer calories.  If calories are listed on the menu, choose the lower-calorie options.  Choose dishes that include vegetables, fruits, whole grains, low-fat dairy products, and lean proteins.  Choose items that are boiled, broiled, grilled, or steamed. Avoid items that are buttered, battered, fried, or served with cream sauce. Items labeled as crispy are usually fried, unless stated otherwise.  Choose water, low-fat milk, unsweetened iced tea, or other drinks without added sugar. If you want an alcoholic beverage, choose a lower-calorie option, such as a glass of wine or light beer.  Ask for dressings, sauces, and syrups on the side. These are usually high in calories, so you should limit the amount you eat.  If you want a salad, choose a garden salad and ask for grilled meats. Avoid extra toppings such as vargas, cheese, or fried items. Ask for the dressing on the side, or ask for olive oil and vinegar or lemon to use as dressing.  Estimate how many servings of a food you are given. Knowing serving sizes will help you be aware of how much food you are eating at restaurants.  Where to find more information  Centers for Disease Control and Prevention: www.cdc.gov  [place] Department of Agriculture: myplate.gov  Summary  Calorie counting means keeping track of how many calories you eat and drink each day. If you eat fewer calories than your body needs, you should lose weight.  A healthy amount of weight to lose per week is usually 1-2 lb (0.5-0.9 kg). This usually means reducing your daily calorie intake by 500-750 calories.  The number of calories in a food can be found on a Nutrition Facts label.  If a food does not have a Nutrition Facts label, try to look up the calories online or ask your dietitian for help.  Use smaller plates, glasses, and bowls for smaller portions and to prevent overeating.  Use your calories on foods and drinks that will fill you up and not leave you hungry shortly after a meal.  This information is not intended to replace advice given to you by your health care provider. Make sure you discuss any questions you have with your health care provider.  Document Revised: 01/28/2021 Document Reviewed: 01/28/2021  [employer] Patient Education © 2021 [employer]

## 2022-08-11 ENCOUNTER — LAB (OUTPATIENT)
Dept: LAB | Facility: HOSPITAL | Age: 77
End: 2022-08-11

## 2022-08-11 DIAGNOSIS — Z79.01 ANTICOAGULATED ON COUMADIN: ICD-10-CM

## 2022-08-11 DIAGNOSIS — Z51.81 ENCOUNTER FOR THERAPEUTIC DRUG MONITORING: ICD-10-CM

## 2022-08-11 DIAGNOSIS — D64.9 MILD ANEMIA: ICD-10-CM

## 2022-08-11 DIAGNOSIS — Z12.5 PROSTATE CANCER SCREENING: ICD-10-CM

## 2022-08-11 DIAGNOSIS — E78.2 MIXED HYPERLIPIDEMIA: ICD-10-CM

## 2022-08-11 LAB
FERRITIN SERPL-MCNC: 426.2 NG/ML (ref 30–400)
HCYS SERPL-MCNC: 14.5 UMOL/L (ref 0–15)
INR PPP: 2.89 (ref 0.84–1.13)
IRON 24H UR-MRATE: 81 MCG/DL (ref 59–158)
IRON SATN MFR SERPL: 25 % (ref 20–50)
PROTHROMBIN TIME: 30.4 SECONDS (ref 11.4–14.4)
PSA SERPL-MCNC: 2.37 NG/ML (ref 0–4)
TIBC SERPL-MCNC: 326 MCG/DL (ref 298–536)
TRANSFERRIN SERPL-MCNC: 219 MG/DL (ref 200–360)
VIT B12 BLD-MCNC: 514 PG/ML (ref 211–946)

## 2022-08-11 PROCEDURE — G0103 PSA SCREENING: HCPCS

## 2022-08-11 PROCEDURE — 85610 PROTHROMBIN TIME: CPT

## 2022-08-11 PROCEDURE — 82728 ASSAY OF FERRITIN: CPT

## 2022-08-11 PROCEDURE — 84466 ASSAY OF TRANSFERRIN: CPT

## 2022-08-11 PROCEDURE — 83540 ASSAY OF IRON: CPT

## 2022-08-11 PROCEDURE — 82607 VITAMIN B-12: CPT

## 2022-08-11 PROCEDURE — 93793 ANTICOAG MGMT PT WARFARIN: CPT | Performed by: INTERNAL MEDICINE

## 2022-08-11 PROCEDURE — 83090 ASSAY OF HOMOCYSTEINE: CPT

## 2022-09-09 ENCOUNTER — LAB (OUTPATIENT)
Dept: LAB | Facility: HOSPITAL | Age: 77
End: 2022-09-09

## 2022-09-09 DIAGNOSIS — Z51.81 ENCOUNTER FOR THERAPEUTIC DRUG MONITORING: ICD-10-CM

## 2022-09-09 DIAGNOSIS — Z79.01 ANTICOAGULATED ON COUMADIN: ICD-10-CM

## 2022-09-09 LAB
INR PPP: 3 (ref 0.84–1.13)
PROTHROMBIN TIME: 31.3 SECONDS (ref 11.4–14.4)

## 2022-09-09 PROCEDURE — 93793 ANTICOAG MGMT PT WARFARIN: CPT | Performed by: INTERNAL MEDICINE

## 2022-09-09 PROCEDURE — 85610 PROTHROMBIN TIME: CPT

## 2022-10-06 ENCOUNTER — LAB (OUTPATIENT)
Dept: LAB | Facility: HOSPITAL | Age: 77
End: 2022-10-06

## 2022-10-06 DIAGNOSIS — Z51.81 ENCOUNTER FOR THERAPEUTIC DRUG MONITORING: ICD-10-CM

## 2022-10-06 DIAGNOSIS — Z79.01 ANTICOAGULATED ON COUMADIN: ICD-10-CM

## 2022-10-06 LAB
INR PPP: 2.59 (ref 0.84–1.13)
PROTHROMBIN TIME: 27.9 SECONDS (ref 11.4–14.4)

## 2022-10-06 PROCEDURE — 85610 PROTHROMBIN TIME: CPT

## 2022-10-06 PROCEDURE — 93793 ANTICOAG MGMT PT WARFARIN: CPT | Performed by: INTERNAL MEDICINE

## 2022-10-07 DIAGNOSIS — H90.3 SENSORINEURAL HEARING LOSS (SNHL) OF BOTH EARS: Primary | ICD-10-CM

## 2022-10-07 RX ORDER — ALLOPURINOL 100 MG/1
TABLET ORAL
Qty: 90 TABLET | Refills: 3 | Status: SHIPPED | OUTPATIENT
Start: 2022-10-07

## 2022-10-07 NOTE — TELEPHONE ENCOUNTER
Rx Refill Note  Requested Prescriptions     Pending Prescriptions Disp Refills   • allopurinol (ZYLOPRIM) 100 MG tablet [Pharmacy Med Name: ALLOPURINOL 100 MG TABLET] 90 tablet 3     Sig: TAKE ONE TABLET BY MOUTH DAILY      Last office visit with prescribing clinician: 7/22/2022      Next office visit with prescribing clinician: 1/25/2023            Leatha Massey LPN  10/07/22, 10:31 EDT

## 2022-11-03 ENCOUNTER — LAB (OUTPATIENT)
Dept: LAB | Facility: HOSPITAL | Age: 77
End: 2022-11-03

## 2022-11-03 DIAGNOSIS — Z79.01 ANTICOAGULATED ON COUMADIN: ICD-10-CM

## 2022-11-03 DIAGNOSIS — Z51.81 ENCOUNTER FOR THERAPEUTIC DRUG MONITORING: Primary | ICD-10-CM

## 2022-11-03 LAB
INR PPP: 2.45 (ref 0.84–1.13)
PROTHROMBIN TIME: 26.7 SECONDS (ref 11.4–14.4)

## 2022-11-03 PROCEDURE — 93793 ANTICOAG MGMT PT WARFARIN: CPT | Performed by: INTERNAL MEDICINE

## 2022-11-03 PROCEDURE — 85610 PROTHROMBIN TIME: CPT

## 2022-12-01 ENCOUNTER — LAB (OUTPATIENT)
Dept: LAB | Facility: HOSPITAL | Age: 77
End: 2022-12-01

## 2022-12-01 ENCOUNTER — TELEPHONE (OUTPATIENT)
Dept: INTERNAL MEDICINE | Facility: CLINIC | Age: 77
End: 2022-12-01

## 2022-12-01 DIAGNOSIS — Z51.81 ENCOUNTER FOR THERAPEUTIC DRUG MONITORING: ICD-10-CM

## 2022-12-01 DIAGNOSIS — Z79.01 ANTICOAGULATED ON COUMADIN: ICD-10-CM

## 2022-12-01 LAB
INR PPP: 3.76 (ref 0.84–1.13)
PROTHROMBIN TIME: 37.5 SECONDS (ref 11.4–14.4)

## 2022-12-01 PROCEDURE — 85610 PROTHROMBIN TIME: CPT

## 2022-12-01 NOTE — TELEPHONE ENCOUNTER
Lab called to report critical INR of 3.76. Pt is currently taking 10mg 2 days a week and 9mg all other days.

## 2022-12-16 ENCOUNTER — LAB (OUTPATIENT)
Dept: LAB | Facility: HOSPITAL | Age: 77
End: 2022-12-16

## 2022-12-16 DIAGNOSIS — Z51.81 ENCOUNTER FOR THERAPEUTIC DRUG MONITORING: ICD-10-CM

## 2022-12-16 DIAGNOSIS — Z79.01 ANTICOAGULATED ON COUMADIN: Primary | ICD-10-CM

## 2022-12-16 DIAGNOSIS — Z79.01 ANTICOAGULATED ON COUMADIN: ICD-10-CM

## 2022-12-16 LAB
INR PPP: 2.58 (ref 0.84–1.13)
PROTHROMBIN TIME: 27.8 SECONDS (ref 11.4–14.4)

## 2022-12-16 PROCEDURE — 36415 COLL VENOUS BLD VENIPUNCTURE: CPT

## 2022-12-16 PROCEDURE — 85610 PROTHROMBIN TIME: CPT

## 2022-12-16 PROCEDURE — 93793 ANTICOAG MGMT PT WARFARIN: CPT | Performed by: INTERNAL MEDICINE

## 2023-01-25 ENCOUNTER — OFFICE VISIT (OUTPATIENT)
Dept: INTERNAL MEDICINE | Facility: CLINIC | Age: 78
End: 2023-01-25
Payer: MEDICARE

## 2023-01-25 VITALS
HEART RATE: 65 BPM | SYSTOLIC BLOOD PRESSURE: 118 MMHG | WEIGHT: 224.2 LBS | BODY MASS INDEX: 29.72 KG/M2 | HEIGHT: 73 IN | TEMPERATURE: 97.7 F | OXYGEN SATURATION: 95 % | DIASTOLIC BLOOD PRESSURE: 62 MMHG

## 2023-01-25 DIAGNOSIS — E78.2 MIXED HYPERLIPIDEMIA: Chronic | ICD-10-CM

## 2023-01-25 DIAGNOSIS — I83.893 VARICOSE VEINS OF BILATERAL LOWER EXTREMITIES WITH OTHER COMPLICATIONS: ICD-10-CM

## 2023-01-25 DIAGNOSIS — Z87.39 PERSONAL HISTORY OF GOUT: Chronic | ICD-10-CM

## 2023-01-25 DIAGNOSIS — I48.20 CHRONIC ATRIAL FIBRILLATION: ICD-10-CM

## 2023-01-25 DIAGNOSIS — I10 BENIGN ESSENTIAL HYPERTENSION: Primary | ICD-10-CM

## 2023-01-25 DIAGNOSIS — E55.9 VITAMIN D DEFICIENCY: Chronic | ICD-10-CM

## 2023-01-25 PROCEDURE — 99214 OFFICE O/P EST MOD 30 MIN: CPT | Performed by: INTERNAL MEDICINE

## 2023-01-25 NOTE — PATIENT INSTRUCTIONS
Patient Instructions   Problem List Items Addressed This Visit          Cardiac and Vasculature    Mixed hyperlipidemia (Chronic)    Overview     Lifestyle control.         Current Assessment & Plan     He will continue to avoid sugars, white carbohydrates, and fats in the diet. Continue [place] exercise [place]. Also continue using barbells at home. I also recommend doing some walking on a treadmill or use an exercise bike or walk at the mall on bad weather days.         Relevant Orders    Vitamin B12    Lipid Panel    Benign essential hypertension - Primary    Overview     Taking olmesartan daily and doxazosin every evening.           Current Assessment & Plan     He will continue olmesartan and doxazosin. Continue to avoid salt in the diet.         Relevant Medications    olmesartan (Benicar) 20 MG tablet    doxazosin (CARDURA) 4 MG tablet    Other Relevant Orders    CBC & Differential    Comprehensive Metabolic Panel    Microalbumin / Creatinine Urine Ratio - Urine, Clean Catch    Urinalysis With Microscopic - Urine, Clean Catch    Chronic atrial fibrillation    Overview     Anticoagulation with Coumadin and regular PT/INR checks.           Current Assessment & Plan     He will continue anticoagulation with Coumadin. He will continue regular PT/INR checks.         Varicose veins of lower extremity    Overview     Varicose veins with edema             Current Assessment & Plan     He will continue wearing support hose or support socks daily.            Endocrine and Metabolic    Vitamin D deficiency (Chronic)    Relevant Orders    Vitamin D,25-Hydroxy       Musculoskeletal and Injuries    Personal history of gout (Chronic)    Current Assessment & Plan     We will recheck uric acid level today. He will continue taking allopurinol daily.          Relevant Orders    Uric Acid       BMI for Adults  What is BMI?  Body mass index (BMI) is a number that is calculated from a person's weight and height. BMI can  "help estimate how much of a person's weight is composed of fat. BMI does not measure body fat directly. Rather, it is an alternative to procedures that directly measure body fat, which can be difficult and expensive.  BMI can help identify people who may be at higher risk for certain medical problems.  What are BMI measurements used for?  BMI is used as a screening tool to identify possible weight problems. It helps determine whether a person is obese, overweight, a healthy weight, or underweight.  BMI is useful for:  Identifying a weight problem that may be related to a medical condition or may increase the risk for medical problems.  Promoting changes, such as changes in diet and exercise, to help reach a healthy weight. BMI screening can be repeated to see if these changes are working.  How is BMI calculated?  BMI involves measuring your weight in relation to your height. Both height and weight are measured, and the BMI is calculated from those numbers. This can be done either in English (U[place] or metric measurements. Note that charts and online BMI calculators are available to help you find your BMI quickly and easily without having to do these calculations yourself.  To calculate your BMI in English ([place] measurements:    Measure your weight in pounds (lb).  Multiply the number of pounds by 703.  For example, for a person who weighs 180 lb, multiply that number by 703, which equals 126,540.  Measure your height in inches. Then multiply that number by itself to get a measurement called \"inches squared.\"  For example, for a person who is 70 inches tall, the \"inches squared\" measurement is 70 inches x 70 inches, which equals 4,900 inches squared.  Divide the total from step 2 (number of lb x 703) by the total from step 3 (inches squared): 126,540 ÷ 4,900 = 25.8. This is your BMI.  To calculate your BMI in metric measurements:  Measure your weight in kilograms (kg).  Measure your height in meters (m). Then " "multiply that number by itself to get a measurement called \"meters squared.\"  For example, for a person who is 1.75 m tall, the \"meters squared\" measurement is 1.75 m x 1.75 m, which is equal to 3.1 meters squared.  Divide the number of kilograms (your weight) by the meters squared number. In this example: 70 ÷ 3.1 = 22.6. This is your BMI.  What do the results mean?  BMI charts are used to identify whether you are underweight, normal weight, overweight, or obese. The following guidelines will be used:  Underweight: BMI less than 18.5.  Normal weight: BMI between 18.5 and 24.9.  Overweight: BMI between 25 and 29.9.  Obese: BMI of 30 or above.  Keep these notes in mind:  Weight includes both fat and muscle, so someone with a muscular build, such as an athlete, may have a BMI that is higher than 24.9. In cases like these, BMI is not an accurate measure of body fat.  To determine if excess body fat is the cause of a BMI of 25 or higher, further assessments may need to be done by a health care provider.  BMI is usually interpreted in the same way for men and women.  Where to find more information  For more information about BMI, including tools to quickly calculate your BMI, go to these websites:  Centers for Disease Control and Prevention: www.cdc.gov  American Heart Association: www.heart.org  National Heart, Lung, and Blood [place]: www.nhlbi.nih.gov  Summary  Body mass index (BMI) is a number that is calculated from a person's weight and height.  BMI may help estimate how much of a person's weight is composed of fat. BMI can help identify those who may be at higher risk for certain medical problems.  BMI can be measured using English measurements or metric measurements.  BMI charts are used to identify whether you are underweight, normal weight, overweight, or obese.  This information is not intended to replace advice given to you by your health care provider. Make sure you discuss any questions you have with your " health care provider.  Document Revised: 09/09/2020 Document Reviewed: 07/17/2020  [employer] Patient Education © 2022 [employer]

## 2023-01-25 NOTE — ASSESSMENT & PLAN NOTE
He will continue to avoid sugars, white carbohydrates, and fats in the diet. Continue regular exercise [place] golf [place]. Also continue using barbells at home. I also recommend doing some walking on a treadmill or use an exercise bike or walk at the mall on bad weather days.

## 2023-01-26 ENCOUNTER — LAB (OUTPATIENT)
Dept: LAB | Facility: HOSPITAL | Age: 78
End: 2023-01-26
Payer: MEDICARE

## 2023-01-26 DIAGNOSIS — E78.2 MIXED HYPERLIPIDEMIA: Chronic | ICD-10-CM

## 2023-01-26 DIAGNOSIS — Z79.01 ANTICOAGULATED ON COUMADIN: ICD-10-CM

## 2023-01-26 DIAGNOSIS — I10 BENIGN ESSENTIAL HYPERTENSION: ICD-10-CM

## 2023-01-26 DIAGNOSIS — E55.9 VITAMIN D DEFICIENCY: Chronic | ICD-10-CM

## 2023-01-26 DIAGNOSIS — Z87.39 PERSONAL HISTORY OF GOUT: Chronic | ICD-10-CM

## 2023-01-26 DIAGNOSIS — Z51.81 ENCOUNTER FOR THERAPEUTIC DRUG MONITORING: ICD-10-CM

## 2023-01-26 LAB
25(OH)D3 SERPL-MCNC: 94.9 NG/ML (ref 30–100)
ALBUMIN SERPL-MCNC: 4.4 G/DL (ref 3.5–5.2)
ALBUMIN UR-MCNC: <1.2 MG/DL
ALBUMIN/GLOB SERPL: 2 G/DL
ALP SERPL-CCNC: 75 U/L (ref 39–117)
ALT SERPL W P-5'-P-CCNC: 18 U/L (ref 1–41)
ANION GAP SERPL CALCULATED.3IONS-SCNC: 6.9 MMOL/L (ref 5–15)
AST SERPL-CCNC: 21 U/L (ref 1–40)
BACTERIA UR QL AUTO: NORMAL /HPF
BASOPHILS # BLD AUTO: 0.05 10*3/MM3 (ref 0–0.2)
BASOPHILS NFR BLD AUTO: 0.6 % (ref 0–1.5)
BILIRUB SERPL-MCNC: 0.5 MG/DL (ref 0–1.2)
BILIRUB UR QL STRIP: NEGATIVE
BUN SERPL-MCNC: 23 MG/DL (ref 8–23)
BUN/CREAT SERPL: 20.5 (ref 7–25)
CALCIUM SPEC-SCNC: 9.5 MG/DL (ref 8.6–10.5)
CHLORIDE SERPL-SCNC: 101 MMOL/L (ref 98–107)
CHOLEST SERPL-MCNC: 212 MG/DL (ref 0–200)
CLARITY UR: CLEAR
CO2 SERPL-SCNC: 30.1 MMOL/L (ref 22–29)
COLOR UR: YELLOW
CREAT SERPL-MCNC: 1.12 MG/DL (ref 0.76–1.27)
CREAT UR-MCNC: 127.2 MG/DL
DEPRECATED RDW RBC AUTO: 50 FL (ref 37–54)
EGFRCR SERPLBLD CKD-EPI 2021: 68.1 ML/MIN/1.73
EOSINOPHIL # BLD AUTO: 0.46 10*3/MM3 (ref 0–0.4)
EOSINOPHIL NFR BLD AUTO: 5.9 % (ref 0.3–6.2)
ERYTHROCYTE [DISTWIDTH] IN BLOOD BY AUTOMATED COUNT: 13.8 % (ref 12.3–15.4)
GLOBULIN UR ELPH-MCNC: 2.2 GM/DL
GLUCOSE SERPL-MCNC: 104 MG/DL (ref 65–99)
GLUCOSE UR STRIP-MCNC: NEGATIVE MG/DL
HCT VFR BLD AUTO: 38.9 % (ref 37.5–51)
HDLC SERPL-MCNC: 80 MG/DL (ref 40–60)
HGB BLD-MCNC: 13.5 G/DL (ref 13–17.7)
HGB UR QL STRIP.AUTO: NEGATIVE
HYALINE CASTS UR QL AUTO: NORMAL /LPF
IMM GRANULOCYTES # BLD AUTO: 0.02 10*3/MM3 (ref 0–0.05)
IMM GRANULOCYTES NFR BLD AUTO: 0.3 % (ref 0–0.5)
INR PPP: 3.12 (ref 0.84–1.13)
KETONES UR QL STRIP: NEGATIVE
LDLC SERPL CALC-MCNC: 124 MG/DL (ref 0–100)
LDLC/HDLC SERPL: 1.53 {RATIO}
LEUKOCYTE ESTERASE UR QL STRIP.AUTO: NEGATIVE
LYMPHOCYTES # BLD AUTO: 4.28 10*3/MM3 (ref 0.7–3.1)
LYMPHOCYTES NFR BLD AUTO: 54.5 % (ref 19.6–45.3)
MCH RBC QN AUTO: 34.4 PG (ref 26.6–33)
MCHC RBC AUTO-ENTMCNC: 34.7 G/DL (ref 31.5–35.7)
MCV RBC AUTO: 99 FL (ref 79–97)
MICROALBUMIN/CREAT UR: NORMAL MG/G{CREAT}
MONOCYTES # BLD AUTO: 0.38 10*3/MM3 (ref 0.1–0.9)
MONOCYTES NFR BLD AUTO: 4.8 % (ref 5–12)
NEUTROPHILS NFR BLD AUTO: 2.66 10*3/MM3 (ref 1.7–7)
NEUTROPHILS NFR BLD AUTO: 33.9 % (ref 42.7–76)
NITRITE UR QL STRIP: NEGATIVE
NRBC BLD AUTO-RTO: 0 /100 WBC (ref 0–0.2)
PH UR STRIP.AUTO: 6 [PH] (ref 5–8)
PLATELET # BLD AUTO: 146 10*3/MM3 (ref 140–450)
PMV BLD AUTO: 11.5 FL (ref 6–12)
POTASSIUM SERPL-SCNC: 4.5 MMOL/L (ref 3.5–5.2)
PROT SERPL-MCNC: 6.6 G/DL (ref 6–8.5)
PROT UR QL STRIP: NEGATIVE
PROTHROMBIN TIME: 32.3 SECONDS (ref 11.4–14.4)
RBC # BLD AUTO: 3.93 10*6/MM3 (ref 4.14–5.8)
RBC # UR STRIP: NORMAL /HPF
REF LAB TEST METHOD: NORMAL
SODIUM SERPL-SCNC: 138 MMOL/L (ref 136–145)
SP GR UR STRIP: 1.02 (ref 1–1.03)
SQUAMOUS #/AREA URNS HPF: NORMAL /HPF
TRIGL SERPL-MCNC: 47 MG/DL (ref 0–150)
URATE SERPL-MCNC: 6.2 MG/DL (ref 3.4–7)
UROBILINOGEN UR QL STRIP: NORMAL
VIT B12 BLD-MCNC: 816 PG/ML (ref 211–946)
VLDLC SERPL-MCNC: 8 MG/DL (ref 5–40)
WBC # UR STRIP: NORMAL /HPF
WBC NRBC COR # BLD: 7.85 10*3/MM3 (ref 3.4–10.8)

## 2023-01-26 PROCEDURE — 80053 COMPREHEN METABOLIC PANEL: CPT

## 2023-01-26 PROCEDURE — 85025 COMPLETE CBC W/AUTO DIFF WBC: CPT

## 2023-01-26 PROCEDURE — 82043 UR ALBUMIN QUANTITATIVE: CPT

## 2023-01-26 PROCEDURE — 36415 COLL VENOUS BLD VENIPUNCTURE: CPT

## 2023-01-26 PROCEDURE — 81001 URINALYSIS AUTO W/SCOPE: CPT

## 2023-01-26 PROCEDURE — 80061 LIPID PANEL: CPT

## 2023-01-26 PROCEDURE — 82306 VITAMIN D 25 HYDROXY: CPT

## 2023-01-26 PROCEDURE — 82607 VITAMIN B-12: CPT

## 2023-01-26 PROCEDURE — 85610 PROTHROMBIN TIME: CPT

## 2023-01-26 PROCEDURE — 84550 ASSAY OF BLOOD/URIC ACID: CPT

## 2023-01-26 PROCEDURE — 82570 ASSAY OF URINE CREATININE: CPT

## 2023-02-14 RX ORDER — WARFARIN SODIUM 5 MG/1
TABLET ORAL
Qty: 90 TABLET | Refills: 0 | Status: SHIPPED | OUTPATIENT
Start: 2023-02-14

## 2023-02-14 NOTE — TELEPHONE ENCOUNTER
Rx Refill Note  Requested Prescriptions     Pending Prescriptions Disp Refills   • warfarin (COUMADIN) 5 MG tablet 90 tablet 0     Sig: TAKE 2 TABLETS BY MOUTH FOR 2 DAYS A WEEK AND 9MG FOR 5 DAYS A WEEK      Last office visit with prescribing clinician: 1/25/2023   Last telemedicine visit with prescribing clinician: 7/26/2023   Next office visit with prescribing clinician: 7/26/2023                         Would you like a call back once the refill request has been completed: [] Yes [] No    If the office needs to give you a call back, can they leave a voicemail: [] Yes [] No    Michaelle Xiong LPN  02/14/23, 07:32 EST

## 2023-02-23 ENCOUNTER — LAB (OUTPATIENT)
Dept: LAB | Facility: HOSPITAL | Age: 78
End: 2023-02-23
Payer: MEDICARE

## 2023-02-23 DIAGNOSIS — Z79.01 ANTICOAGULATED ON COUMADIN: ICD-10-CM

## 2023-02-23 DIAGNOSIS — Z51.81 ENCOUNTER FOR THERAPEUTIC DRUG MONITORING: Primary | ICD-10-CM

## 2023-02-23 LAB
INR PPP: 2.93 (ref 0.84–1.13)
PROTHROMBIN TIME: 30.8 SECONDS (ref 11.4–14.4)

## 2023-02-23 PROCEDURE — 36415 COLL VENOUS BLD VENIPUNCTURE: CPT

## 2023-02-23 PROCEDURE — 85610 PROTHROMBIN TIME: CPT

## 2023-03-23 ENCOUNTER — LAB (OUTPATIENT)
Dept: LAB | Facility: HOSPITAL | Age: 78
End: 2023-03-23
Payer: MEDICARE

## 2023-03-23 DIAGNOSIS — Z51.81 ENCOUNTER FOR THERAPEUTIC DRUG MONITORING: ICD-10-CM

## 2023-03-23 DIAGNOSIS — Z79.01 ANTICOAGULATED ON COUMADIN: ICD-10-CM

## 2023-03-23 LAB
INR PPP: 2.3 (ref 0.84–1.13)
PROTHROMBIN TIME: 25.3 SECONDS (ref 11.4–14.4)

## 2023-03-23 PROCEDURE — 36415 COLL VENOUS BLD VENIPUNCTURE: CPT

## 2023-03-23 PROCEDURE — 85610 PROTHROMBIN TIME: CPT

## 2023-03-29 RX ORDER — WARFARIN SODIUM 4 MG/1
TABLET ORAL
Qty: 52 TABLET | Refills: 5 | Status: SHIPPED | OUTPATIENT
Start: 2023-03-29

## 2023-03-29 NOTE — TELEPHONE ENCOUNTER
Rx Refill Note  Requested Prescriptions     Pending Prescriptions Disp Refills   • warfarin (COUMADIN) 4 MG tablet [Pharmacy Med Name: WARFARIN SODIUM 4 MG TABLET] 52 tablet      Sig: TAKE 10MG FOR 2 DAYS A WEEK AND 9MG DAILY FOR 5 DAYS PER WEEK      Last office visit with prescribing clinician: 1/25/2023   Last telemedicine visit with prescribing clinician: 7/26/2023   Next office visit with prescribing clinician: 7/26/2023                         Would you like a call back once the refill request has been completed: [] Yes [] No    If the office needs to give you a call back, can they leave a voicemail: [] Yes [] No    Michaelle Xiong LPN  03/29/23, 07:38 EDT

## 2023-04-20 ENCOUNTER — LAB (OUTPATIENT)
Dept: LAB | Facility: HOSPITAL | Age: 78
End: 2023-04-20
Payer: MEDICARE

## 2023-04-20 DIAGNOSIS — Z51.81 ENCOUNTER FOR THERAPEUTIC DRUG MONITORING: Primary | ICD-10-CM

## 2023-04-20 DIAGNOSIS — Z79.01 ANTICOAGULATED ON COUMADIN: ICD-10-CM

## 2023-04-20 LAB
INR PPP: 3.45 (ref 0.84–1.13)
PROTHROMBIN TIME: 35 SECONDS (ref 11.4–14.4)

## 2023-04-20 PROCEDURE — 93793 ANTICOAG MGMT PT WARFARIN: CPT | Performed by: INTERNAL MEDICINE

## 2023-04-20 PROCEDURE — 36415 COLL VENOUS BLD VENIPUNCTURE: CPT

## 2023-04-20 PROCEDURE — 85610 PROTHROMBIN TIME: CPT

## 2023-04-24 RX ORDER — WARFARIN SODIUM 5 MG/1
TABLET ORAL
Qty: 90 TABLET | Refills: 0 | Status: SHIPPED | OUTPATIENT
Start: 2023-04-24

## 2023-04-24 NOTE — TELEPHONE ENCOUNTER
Rx Refill Note  Requested Prescriptions     Pending Prescriptions Disp Refills   • warfarin (COUMADIN) 5 MG tablet [Pharmacy Med Name: WARFARIN SODIUM 5 MG TABLET] 90 tablet 0     Sig: TAKE 2 TABLETS BY MOUTH TWO DAYS A WEEK AND TAKE 9MG  (ONE 5MG TABLET AND ONE 4MG TABLET) BY MOUTH DAILY FOR 5 DAYS A WEEK      Last office visit with prescribing clinician: 1/25/2023   Last telemedicine visit with prescribing clinician: 7/26/2023   Next office visit with prescribing clinician: 7/26/2023                         Would you like a call back once the refill request has been completed: [] Yes [] No    If the office needs to give you a call back, can they leave a voicemail: [] Yes [] No    Michaelle Xiong LPN  04/24/23, 10:21 EDT

## 2023-05-19 ENCOUNTER — LAB (OUTPATIENT)
Dept: LAB | Facility: HOSPITAL | Age: 78
End: 2023-05-19
Payer: MEDICARE

## 2023-05-19 DIAGNOSIS — Z79.01 ANTICOAGULATED ON COUMADIN: ICD-10-CM

## 2023-05-19 DIAGNOSIS — Z51.81 ENCOUNTER FOR THERAPEUTIC DRUG MONITORING: Primary | ICD-10-CM

## 2023-05-19 LAB
INR PPP: 3.26 (ref 0.89–1.12)
PROTHROMBIN TIME: 33.5 SECONDS (ref 12.2–14.5)

## 2023-05-19 PROCEDURE — 36415 COLL VENOUS BLD VENIPUNCTURE: CPT

## 2023-05-19 PROCEDURE — 85610 PROTHROMBIN TIME: CPT

## 2023-05-19 PROCEDURE — 93793 ANTICOAG MGMT PT WARFARIN: CPT | Performed by: INTERNAL MEDICINE

## 2023-05-19 NOTE — TELEPHONE ENCOUNTER
Called and left vm w/patient asking to callback. We have the following to ask from Dr. Carlson:    Please call patient.  INR is high at 3.26.  Please confirm his current dose of warfarin.

## 2023-05-22 NOTE — TELEPHONE ENCOUNTER
"Patient returned call    He states he has been taking 9 mg 6 days per week and 10 mg 1 day per week    He did not take warfarin Friday and Saturday, he took his regular 9 mg Sunday which \"is what we did last time it was high\"  "

## 2023-05-22 NOTE — TELEPHONE ENCOUNTER
"Called and left detailed voicemail advising the below, requesting a callback for follow-up to go over verbally:    \"decrease his warfarin dose to 9 mg every day.\" - from Dr. Carlson      "

## 2023-05-23 RX ORDER — WARFARIN SODIUM 4 MG/1
TABLET ORAL
Qty: 30 TABLET | Refills: 5 | Status: SHIPPED | OUTPATIENT
Start: 2023-05-23

## 2023-05-23 RX ORDER — WARFARIN SODIUM 5 MG/1
TABLET ORAL
Qty: 30 TABLET | Refills: 5 | Status: SHIPPED | OUTPATIENT
Start: 2023-05-23

## 2023-06-15 ENCOUNTER — LAB (OUTPATIENT)
Dept: LAB | Facility: HOSPITAL | Age: 78
End: 2023-06-15
Payer: MEDICARE

## 2023-06-15 DIAGNOSIS — Z79.01 ANTICOAGULATED ON COUMADIN: ICD-10-CM

## 2023-06-15 DIAGNOSIS — Z51.81 ENCOUNTER FOR THERAPEUTIC DRUG MONITORING: ICD-10-CM

## 2023-06-15 LAB
INR PPP: 2.72 (ref 0.89–1.12)
PROTHROMBIN TIME: 29 SECONDS (ref 12.2–14.5)

## 2023-06-15 PROCEDURE — 93793 ANTICOAG MGMT PT WARFARIN: CPT | Performed by: INTERNAL MEDICINE

## 2023-06-15 PROCEDURE — 36415 COLL VENOUS BLD VENIPUNCTURE: CPT

## 2023-06-15 PROCEDURE — 85610 PROTHROMBIN TIME: CPT

## 2023-08-07 PROBLEM — D64.9 MILD ANEMIA: Status: RESOLVED | Noted: 2022-07-22 | Resolved: 2023-08-07

## 2023-08-08 ENCOUNTER — LAB (OUTPATIENT)
Dept: LAB | Facility: HOSPITAL | Age: 78
End: 2023-08-08
Payer: MEDICARE

## 2023-08-08 DIAGNOSIS — E55.9 VITAMIN D DEFICIENCY: Chronic | ICD-10-CM

## 2023-08-08 DIAGNOSIS — E78.2 MIXED HYPERLIPIDEMIA: Chronic | ICD-10-CM

## 2023-08-08 DIAGNOSIS — Z87.39 PERSONAL HISTORY OF GOUT: Chronic | ICD-10-CM

## 2023-08-08 DIAGNOSIS — Z51.81 ENCOUNTER FOR THERAPEUTIC DRUG MONITORING: ICD-10-CM

## 2023-08-08 DIAGNOSIS — Z79.01 ANTICOAGULATED ON COUMADIN: ICD-10-CM

## 2023-08-08 DIAGNOSIS — I10 BENIGN ESSENTIAL HYPERTENSION: ICD-10-CM

## 2023-08-08 LAB
25(OH)D3 SERPL-MCNC: 78.7 NG/ML (ref 30–100)
ALBUMIN SERPL-MCNC: 4.1 G/DL (ref 3.5–5.2)
ALBUMIN UR-MCNC: <1.2 MG/DL
ALBUMIN/GLOB SERPL: 1.8 G/DL
ALP SERPL-CCNC: 69 U/L (ref 39–117)
ALT SERPL W P-5'-P-CCNC: 15 U/L (ref 1–41)
ANION GAP SERPL CALCULATED.3IONS-SCNC: 11.3 MMOL/L (ref 5–15)
AST SERPL-CCNC: 18 U/L (ref 1–40)
BACTERIA UR QL AUTO: NORMAL /HPF
BASOPHILS # BLD AUTO: 0.03 10*3/MM3 (ref 0–0.2)
BASOPHILS NFR BLD AUTO: 0.4 % (ref 0–1.5)
BILIRUB SERPL-MCNC: 0.6 MG/DL (ref 0–1.2)
BILIRUB UR QL STRIP: NEGATIVE
BUN SERPL-MCNC: 22 MG/DL (ref 8–23)
BUN/CREAT SERPL: 18.8 (ref 7–25)
CALCIUM SPEC-SCNC: 9.6 MG/DL (ref 8.6–10.5)
CHLORIDE SERPL-SCNC: 101 MMOL/L (ref 98–107)
CHOLEST SERPL-MCNC: 199 MG/DL (ref 0–200)
CLARITY UR: CLEAR
CO2 SERPL-SCNC: 24.7 MMOL/L (ref 22–29)
COLOR UR: YELLOW
CREAT SERPL-MCNC: 1.17 MG/DL (ref 0.76–1.27)
CREAT UR-MCNC: 33.1 MG/DL
DEPRECATED RDW RBC AUTO: 49.1 FL (ref 37–54)
EGFRCR SERPLBLD CKD-EPI 2021: 64.2 ML/MIN/1.73
EOSINOPHIL # BLD AUTO: 0.25 10*3/MM3 (ref 0–0.4)
EOSINOPHIL NFR BLD AUTO: 3.7 % (ref 0.3–6.2)
ERYTHROCYTE [DISTWIDTH] IN BLOOD BY AUTOMATED COUNT: 13.6 % (ref 12.3–15.4)
GLOBULIN UR ELPH-MCNC: 2.3 GM/DL
GLUCOSE SERPL-MCNC: 120 MG/DL (ref 65–99)
GLUCOSE UR STRIP-MCNC: NEGATIVE MG/DL
HCT VFR BLD AUTO: 38.7 % (ref 37.5–51)
HDLC SERPL-MCNC: 75 MG/DL (ref 40–60)
HGB BLD-MCNC: 13.1 G/DL (ref 13–17.7)
HGB UR QL STRIP.AUTO: NEGATIVE
HYALINE CASTS UR QL AUTO: NORMAL /LPF
IMM GRANULOCYTES # BLD AUTO: 0.03 10*3/MM3 (ref 0–0.05)
IMM GRANULOCYTES NFR BLD AUTO: 0.4 % (ref 0–0.5)
INR PPP: 2.61 (ref 0.89–1.12)
KETONES UR QL STRIP: NEGATIVE
LDLC SERPL CALC-MCNC: 116 MG/DL (ref 0–100)
LDLC/HDLC SERPL: 1.54 {RATIO}
LEUKOCYTE ESTERASE UR QL STRIP.AUTO: NEGATIVE
LYMPHOCYTES # BLD AUTO: 3.58 10*3/MM3 (ref 0.7–3.1)
LYMPHOCYTES NFR BLD AUTO: 52.7 % (ref 19.6–45.3)
MCH RBC QN AUTO: 33.6 PG (ref 26.6–33)
MCHC RBC AUTO-ENTMCNC: 33.9 G/DL (ref 31.5–35.7)
MCV RBC AUTO: 99.2 FL (ref 79–97)
MICROALBUMIN/CREAT UR: NORMAL MG/G{CREAT}
MONOCYTES # BLD AUTO: 0.38 10*3/MM3 (ref 0.1–0.9)
MONOCYTES NFR BLD AUTO: 5.6 % (ref 5–12)
NEUTROPHILS NFR BLD AUTO: 2.52 10*3/MM3 (ref 1.7–7)
NEUTROPHILS NFR BLD AUTO: 37.2 % (ref 42.7–76)
NITRITE UR QL STRIP: NEGATIVE
NRBC BLD AUTO-RTO: 0 /100 WBC (ref 0–0.2)
PH UR STRIP.AUTO: 6 [PH] (ref 5–8)
PLATELET # BLD AUTO: 145 10*3/MM3 (ref 140–450)
PMV BLD AUTO: 11.4 FL (ref 6–12)
POTASSIUM SERPL-SCNC: 4.5 MMOL/L (ref 3.5–5.2)
PROT SERPL-MCNC: 6.4 G/DL (ref 6–8.5)
PROT UR QL STRIP: NEGATIVE
PROTHROMBIN TIME: 28.2 SECONDS (ref 12.2–14.5)
RBC # BLD AUTO: 3.9 10*6/MM3 (ref 4.14–5.8)
RBC # UR STRIP: NORMAL /HPF
REF LAB TEST METHOD: NORMAL
SODIUM SERPL-SCNC: 137 MMOL/L (ref 136–145)
SP GR UR STRIP: 1.01 (ref 1–1.03)
SQUAMOUS #/AREA URNS HPF: NORMAL /HPF
TRIGL SERPL-MCNC: 43 MG/DL (ref 0–150)
TSH SERPL DL<=0.05 MIU/L-ACNC: 1.36 UIU/ML (ref 0.27–4.2)
URATE SERPL-MCNC: 5.4 MG/DL (ref 3.4–7)
UROBILINOGEN UR QL STRIP: NORMAL
VIT B12 BLD-MCNC: 780 PG/ML (ref 211–946)
VLDLC SERPL-MCNC: 8 MG/DL (ref 5–40)
WBC # UR STRIP: NORMAL /HPF
WBC NRBC COR # BLD: 6.79 10*3/MM3 (ref 3.4–10.8)

## 2023-08-08 PROCEDURE — 85610 PROTHROMBIN TIME: CPT

## 2023-08-08 PROCEDURE — 36415 COLL VENOUS BLD VENIPUNCTURE: CPT

## 2023-08-08 PROCEDURE — 80061 LIPID PANEL: CPT

## 2023-08-08 PROCEDURE — 82607 VITAMIN B-12: CPT

## 2023-08-08 PROCEDURE — 82306 VITAMIN D 25 HYDROXY: CPT

## 2023-08-08 PROCEDURE — 80053 COMPREHEN METABOLIC PANEL: CPT

## 2023-08-08 PROCEDURE — 85025 COMPLETE CBC W/AUTO DIFF WBC: CPT

## 2023-08-08 PROCEDURE — 84443 ASSAY THYROID STIM HORMONE: CPT

## 2023-08-08 PROCEDURE — 81001 URINALYSIS AUTO W/SCOPE: CPT

## 2023-08-08 PROCEDURE — 84550 ASSAY OF BLOOD/URIC ACID: CPT

## 2023-08-08 PROCEDURE — 82570 ASSAY OF URINE CREATININE: CPT

## 2023-08-08 PROCEDURE — 82043 UR ALBUMIN QUANTITATIVE: CPT

## 2023-08-09 ENCOUNTER — OFFICE VISIT (OUTPATIENT)
Dept: INTERNAL MEDICINE | Facility: CLINIC | Age: 78
End: 2023-08-09
Payer: MEDICARE

## 2023-08-09 VITALS
HEART RATE: 71 BPM | SYSTOLIC BLOOD PRESSURE: 158 MMHG | WEIGHT: 227 LBS | BODY MASS INDEX: 30.75 KG/M2 | TEMPERATURE: 98.4 F | DIASTOLIC BLOOD PRESSURE: 70 MMHG | OXYGEN SATURATION: 98 % | HEIGHT: 72 IN

## 2023-08-09 DIAGNOSIS — R00.1 BRADYCARDIA: Chronic | ICD-10-CM

## 2023-08-09 DIAGNOSIS — E55.9 VITAMIN D DEFICIENCY: Chronic | ICD-10-CM

## 2023-08-09 DIAGNOSIS — Z00.00 ANNUAL PHYSICAL EXAM: ICD-10-CM

## 2023-08-09 DIAGNOSIS — I48.20 CHRONIC ATRIAL FIBRILLATION: ICD-10-CM

## 2023-08-09 DIAGNOSIS — Z00.00 MEDICARE ANNUAL WELLNESS VISIT, SUBSEQUENT: Primary | ICD-10-CM

## 2023-08-09 DIAGNOSIS — L98.9 SKIN LESION OF FACE: ICD-10-CM

## 2023-08-09 DIAGNOSIS — I83.893 VARICOSE VEINS OF BILATERAL LOWER EXTREMITIES WITH OTHER COMPLICATIONS: ICD-10-CM

## 2023-08-09 DIAGNOSIS — N40.0 BENIGN PROSTATIC HYPERPLASIA WITHOUT URINARY OBSTRUCTION: ICD-10-CM

## 2023-08-09 DIAGNOSIS — Z79.01 ANTICOAGULATED ON COUMADIN: ICD-10-CM

## 2023-08-09 DIAGNOSIS — E66.3 OVERWEIGHT WITH BODY MASS INDEX (BMI) OF 29 TO 29.9 IN ADULT: Chronic | ICD-10-CM

## 2023-08-09 DIAGNOSIS — E78.2 MIXED HYPERLIPIDEMIA: Chronic | ICD-10-CM

## 2023-08-09 DIAGNOSIS — Z87.39 PERSONAL HISTORY OF GOUT: Chronic | ICD-10-CM

## 2023-08-09 DIAGNOSIS — I10 BENIGN ESSENTIAL HYPERTENSION: ICD-10-CM

## 2023-08-09 DIAGNOSIS — Z12.5 PROSTATE CANCER SCREENING: ICD-10-CM

## 2023-08-09 PROCEDURE — G0439 PPPS, SUBSEQ VISIT: HCPCS | Performed by: INTERNAL MEDICINE

## 2023-08-09 PROCEDURE — 99214 OFFICE O/P EST MOD 30 MIN: CPT | Performed by: INTERNAL MEDICINE

## 2023-08-09 PROCEDURE — 93000 ELECTROCARDIOGRAM COMPLETE: CPT | Performed by: INTERNAL MEDICINE

## 2023-08-09 PROCEDURE — 99397 PER PM REEVAL EST PAT 65+ YR: CPT | Performed by: INTERNAL MEDICINE

## 2023-08-09 PROCEDURE — 1159F MED LIST DOCD IN RCRD: CPT | Performed by: INTERNAL MEDICINE

## 2023-08-09 PROCEDURE — 1160F RVW MEDS BY RX/DR IN RCRD: CPT | Performed by: INTERNAL MEDICINE

## 2023-08-09 PROCEDURE — 3078F DIAST BP <80 MM HG: CPT | Performed by: INTERNAL MEDICINE

## 2023-08-09 PROCEDURE — 3077F SYST BP >= 140 MM HG: CPT | Performed by: INTERNAL MEDICINE

## 2023-08-09 NOTE — PROGRESS NOTES
The ABCs of the Annual Wellness Visit  Initial Medicare Wellness Visit    Chief Complaint   Patient presents with    Medicare Wellness-subsequent    Hypertension       Subjective   History of Present Illness:  Uriel Valverde is a 78 y.o. male who presents for an Initial Medicare Wellness Visit.    Atrial fibrillation  The patient is currently taking warfarin 9 mg daily. His INR is 2.6.    Hypertension  The patient's blood pressure was elevated today at 158/70 mmHg. He just finished playing golf this morning before he came to the office. The patient does not check his blood pressure at home often. When he does check it, his systolic blood pressure is usually in the 130 to 140 mmHg range and his diastolic blood pressure is 69 to 70 mmHg. It does not go over 140 mmHg much.     BPH  The patient denies any prostate symptoms such as trouble urinating.    Hyperlipidemia  The patient's LDL has improved down to 116 mg/dL from 124 mg/dL in 01/2023. His HDL is always good.    Skin lesion of face  The patient had a precancerous lesion on his face years ago. It was removed by his previous dermatologist with liquid nitrogen. The scar tissue is so tender that if he is sweating and touches it, it will break open again and then takes 2 weeks to heal. He was last seen by his dermatologist 4 years ago. He would like a new dermatologist.    Vitamin D deficiency  The patient's vitamin D is good at 78.7 ng/mL. He is taking vitamin D 1000 units.    Knee pain  The patient states that if he goes out and walks a golf course about 9 miles, his knees become stiff for a day.    Health maintenance  The patient has had the bivalent COVID-19 vaccine twice. He will get his influenza vaccine in 09/2023. The patient is up to date on his pneumonia and tetanus vaccines. He is up to date on his colonoscopy.    CHRONIC CONDITIONS:    HEALTH RISK ASSESSMENT    Recent Hospitalizations:  He was not admitted to the hospital during the last year.        Current Medical Providers:  Patient Care Team:  Barbi Carlson MD as PCP - General (Internal Medicine)    Smoking Status:  Social History     Tobacco Use   Smoking Status Former    Packs/day: 0.50    Years: 22.00    Pack years: 11.00    Types: Cigarettes    Start date: 1966    Quit date: 1978    Years since quittin.6   Smokeless Tobacco Never   Tobacco Comments    no passive smoke exposure, quit 79       Alcohol Consumption:  Social History     Substance and Sexual Activity   Alcohol Use Yes    Alcohol/week: 2.0 standard drinks    Types: 2 Glasses of wine per week    Comment: daily       Depression Screen:       2023     1:16 PM   PHQ-2/PHQ-9 Depression Screening   Little Interest or Pleasure in Doing Things 0-->not at all   Feeling Down, Depressed or Hopeless 0-->not at all   PHQ-9: Brief Depression Severity Measure Score 0       Fall Risk Screen:  JONNY Fall Risk Assessment was completed, and patient is at LOW risk for falls.Assessment completed on:2023    Health Habits and Functional and Cognitive Screenin/9/2023     2:26 PM   Functional & Cognitive Status   Do you have difficulty preparing food and eating? No   Do you have difficulty bathing yourself, getting dressed or grooming yourself? No   Do you have difficulty using the toilet? No   Do you have difficulty moving around from place to place? No   Do you have trouble with steps or getting out of a bed or a chair? No   Current Diet Well Balanced Diet   Dental Exam Up to date   Eye Exam Up to date   Exercise (times per week) 5 times per week   Current Exercises Include Weightlifting;Other   Do you need help using the phone?  No   Are you deaf or do you have serious difficulty hearing?  No   Do you need help to go to places out of walking distance? No   Do you need help shopping? No   Do you need help preparing meals?  No   Do you need help with housework?  No   Do you need help with laundry? No   Do you need help  taking your medications? No   Do you need help managing money? No   Do you ever drive or ride in a car without wearing a seat belt? No   Have you felt unusual stress, anger or loneliness in the last month? No   Who do you live with? Spouse   If you need help, do you have trouble finding someone available to you? No   Have you been bothered in the last four weeks by sexual problems? No   Do you have difficulty concentrating, remembering or making decisions? No         Age-appropriate Screening Schedule:  Refer to the list below for future screening recommendations based on patient's age, sex and/or medical conditions. Orders for these recommended tests are listed in the plan section. The patient has been provided with a written plan.    Health Maintenance   Topic Date Due    HEPATITIS C SCREENING  Never done    ANNUAL WELLNESS VISIT  07/22/2023    INFLUENZA VACCINE  10/01/2023    LIPID PANEL  08/08/2024    TDAP/TD VACCINES (4 - Td or Tdap) 01/17/2030    COVID-19 Vaccine  Completed    Pneumococcal Vaccine 65+  Completed    ZOSTER VACCINE  Completed    COLORECTAL CANCER SCREENING  Discontinued        The following portions of the patient's history were reviewed and updated as appropriate: allergies, current medications, past family history, past medical history, past social history, past surgical history, and problem list.    Does the patient have evidence of cognitive impairment? No    Aspirin is not on active medication list.  Aspirin use is not indicated based on review of current medical condition/s. Risk of harm outweighs potential benefits.  .    Outpatient Medications Prior to Visit   Medication Sig Dispense Refill    allopurinol (ZYLOPRIM) 100 MG tablet Take 1 tablet by mouth Daily. 90 tablet 3    cholecalciferol (Vitamin D, Cholecalciferol,) 25 MCG (1000 UT) tablet Take 1 tablet by mouth Daily. 60 tablet 5    doxazosin (CARDURA) 4 MG tablet Take 1 tablet by mouth Every Night. 90 tablet 3    fexofenadine  (ALLEGRA) 180 MG tablet Take 1 tablet by mouth Daily.      folic acid (FOLVITE) 1 MG tablet Take 1 tablet by mouth Daily.      olmesartan (Benicar) 20 MG tablet Take 1 tablet by mouth Daily. 90 tablet 3    vitamin B-12 (CYANOCOBALAMIN) 500 MCG tablet Take 1 tablet by mouth Daily. 90 tablet 1    warfarin (Coumadin) 4 MG tablet Take one tablet by mouth daily (one 4mg and one 5mg totaling 9mg daily) 30 tablet 5    warfarin (Coumadin) 5 MG tablet Take one tablet by mouth daily (one 4mg and one 5mg totaling 9mg daily) 30 tablet 5     No facility-administered medications prior to visit.       Patient Active Problem List   Diagnosis    Benign essential hypertension    Chronic atrial fibrillation    Allergic rhinitis    Primary insomnia    Other fatigue    Varicose veins of lower extremity    Bilateral hearing loss    Allergy to mold    Allergy to wheat    Vitamin D deficiency    Mixed hyperlipidemia    Anticoagulated on Coumadin    Erectile dysfunction    Benign prostatic hyperplasia without urinary obstruction    Adverse effect of anticoagulant    Chronic right shoulder pain    Personal history of gout    Overweight with body mass index (BMI) of 29 to 29.9 in adult    Encounter for therapeutic drug monitoring    Annual physical exam    Medicare annual wellness visit, subsequent    Bradycardia    Skin lesion of face       Advanced Care Planning:  Advance Directive is on file.  ACP discussion was held with the patient during this visit. Patient has an advance directive in EMR which is still valid.     Review of Systems    Compared to one year ago, the patient feels his physical health is the same.  Compared to one year ago, the patient feels his mental health is the same.    Reviewed chart for potential of high risk medication in the elderly: yes  Reviewed chart for potential of harmful drug interactions in the elderly:yes    Objective         Vitals:    08/09/23 1310   BP: 158/70   BP Location: Left arm   Patient Position:  "Sitting   Cuff Size: Adult   Pulse: 71   Temp: 98.4 øF (36.9 øC)   TempSrc: Infrared   SpO2: 98%   Weight: 103 kg (227 lb)   Height: 183.7 cm (72.32\")   PainSc: 0-No pain          Body mass index is 30.51 kg/mý.  Discussed the patient's BMI with him. The BMI is above average; BMI management plan is completed.    Physical Exam  Vitals and nursing note reviewed.   Constitutional:       Appearance: He is well-developed and overweight.   Eyes:      Conjunctiva/sclera: Conjunctivae normal.      Pupils: Pupils are equal, round, and reactive to light.   Neck:      Thyroid: No thyromegaly.   Cardiovascular:      Rate and Rhythm: Normal rate. Rhythm irregularly irregular.      Heart sounds: Normal heart sounds. No murmur heard.  Pulmonary:      Effort: Pulmonary effort is normal.      Breath sounds: Normal breath sounds. No wheezing.   Abdominal:      General: Bowel sounds are normal. There is no distension.      Palpations: Abdomen is soft. There is no mass.      Tenderness: There is no abdominal tenderness.   Musculoskeletal:         General: No tenderness. Normal range of motion.      Cervical back: Normal range of motion and neck supple.   Lymphadenopathy:      Cervical: No cervical adenopathy.   Skin:     General: Skin is warm and dry.      Findings: Lesion present. No rash.      Comments: Skin lesion on the left cheek which is dark and wart like and measures 3 mm. It is raised by at least 1 mm.     Neurological:      Mental Status: He is alert and oriented to person, place, and time.      Cranial Nerves: No cranial nerve deficit.      Sensory: No sensory deficit.      Coordination: Coordination normal.      Gait: Gait normal.   Psychiatric:         Speech: Speech normal.         Behavior: Behavior normal.         Thought Content: Thought content normal.         Judgment: Judgment normal.         ECG 12 Lead    Date/Time: 8/9/2023 2:03 PM  Performed by: Barbi Carlson MD  Authorized by: Barbi Carlson MD "   Comparison: compared with previous ECG   Similar to previous ECG  Comparison to previous ECG: No change and poor R progression.  Rhythm: atrial fibrillation  Rate: bradycardic  BPM: 56  Conduction: conduction normal  ST Segments: ST segments normal  T Waves: T waves normal  QRS axis: normal  Other findings: poor R wave progression    Clinical impression: abnormal EKG        Lab Results   Component Value Date    TRIG 43 08/08/2023    HDL 75 (H) 08/08/2023     (H) 08/08/2023    VLDL 8 08/08/2023        Assessment & Plan   Medicare Risks and Personalized Health Plan  CMS Preventative Services Quick Reference  Cardiovascular risk  Obesity/Overweight     The above risks/problems have been discussed with the patient.  Pertinent information has been shared with the patient in the After Visit Summary.  Follow up plans and orders are seen below in the Assessment/Plan Section.  Patient Instructions   Problem List Items Addressed This Visit          Cardiac and Vasculature    Mixed hyperlipidemia (Chronic)    Overview     Lifestyle control.         Current Assessment & Plan     He will continue lifestyle control with low-fat diet and regular exercise.         Relevant Orders    Lipid Panel (Completed)    TSH (Completed)    Vitamin B12 (Completed)    Bradycardia (Chronic)    Overview     Chronic bradycardia is asymptomatic.         Current Assessment & Plan     We will continue to monitor.         Benign essential hypertension    Overview     Taking olmesartan daily and doxazosin every evening.           Current Assessment & Plan     Continue Olmesartan and doxazosin. Continue to avoid salt in the diet.         Relevant Medications    olmesartan (Benicar) 20 MG tablet    doxazosin (CARDURA) 4 MG tablet    Other Relevant Orders    CBC & Differential (Completed)    Comprehensive Metabolic Panel (Completed)    Microalbumin / Creatinine Urine Ratio - Urine, Clean Catch (Completed)    Urinalysis With Microscopic - Urine,  Clean Catch (Completed)    Chronic atrial fibrillation    Overview     Anticoagulation with Coumadin and regular PT/INR checks.           Current Assessment & Plan     He will continue warfarin and continue PT/INR every month.         Relevant Orders    ECG 12 Lead    Varicose veins of lower extremity    Overview     Varicose veins with edema             Current Assessment & Plan     He will continue wearing support hose daily.            Coag and Thromboembolic    Anticoagulated on Coumadin    Overview      regular PT/INR.         Current Assessment & Plan     He will continue regular PT/INR checks.            Endocrine and Metabolic    Vitamin D deficiency (Chronic)    Current Assessment & Plan     Continue current dose of 1000 units vitamin D3 daily.         Relevant Orders    Vitamin D,25-Hydroxy (Completed)       Genitourinary and Reproductive     Benign prostatic hyperplasia without urinary obstruction    Overview     doxazosin every evening.         Current Assessment & Plan     He will continue doxazosin nightly.            Health Encounters    Annual physical exam    Current Assessment & Plan     He is up to date on immunizations. He is up to date on colonoscopy, which was discontinued by his gastroenterologist after the one in 04/2023.         Medicare annual wellness visit, subsequent - Primary    Current Assessment & Plan     He is up to date on immunizations. He is up to date on colonoscopy, which was discontinued by his gastroenterologist after the one in 04/2023.            Musculoskeletal and Injuries    Personal history of gout (Chronic)    Overview     Taking allopurinol 100mg daily.         Current Assessment & Plan     He continues to have a normal uric acid level. He will continue allopurinol daily.         Relevant Orders    Uric Acid (Completed)       Skin    Skin lesion of face    Current Assessment & Plan     We are referring him to Dr. Holly Gray and Holly is with [facility].         Relevant  Orders    Ambulatory Referral to Dermatology (Completed)       Other    Overweight with body mass index (BMI) of 29 to 29.9 in adult (Chronic)    Current Assessment & Plan     His goal weight is 210 pounds. He could lose about 4 pounds per month by increasing physical activity and getting more aerobic exercise. He will continue walking and playing golf. He could also work out at the gym 3 or 4 days a week. He will also decrease portion sizes at mealtime and decrease carbohydrates and fats in the diet. Eat more vegetables. Drink a big glass of water, but 4 meals to promote a feeling of satiety.          Other Visit Diagnoses       Prostate cancer screening        Relevant Orders    PSA Screen             Follow Up:  Next Medicare Wellness visit to be scheduled in 1 year.    Return in about 6 months (around 2/9/2024) for recheck fasting.    An After Visit Summary and PPPS were given to the patient.     Follow Up   Return in about 6 months (around 2/9/2024) for recheck fasting.  Patient was given instructions and counseling regarding his condition or for health maintenance advice. Please see specific information pulled into the AVS if appropriate.     Transcribed from ambient dictation for Barbi Carlson MD by Siomara Chairez.  08/09/23   15:06 EDT    Patient or patient representative verbalized consent to the visit recording.  I have personally performed the services described in this document as transcribed by the above individual, and it is both accurate and complete.

## 2023-08-09 NOTE — ASSESSMENT & PLAN NOTE
He is up to date on immunizations. He is up to date on colonoscopy, which was discontinued by his gastroenterologist after the one in 04/2023.

## 2023-08-09 NOTE — ASSESSMENT & PLAN NOTE
His goal weight is 210 pounds. He could lose about 4 pounds per month by increasing physical activity and getting more aerobic exercise. He will continue walking and playing golf. He could also work out at the gym 3 or 4 days a week. He will also decrease portion sizes at mealtime and decrease carbohydrates and fats in the diet. Eat more vegetables. Drink a big glass of water, but 4 meals to promote a feeling of satiety.

## 2023-08-15 NOTE — PATIENT INSTRUCTIONS
Patient Instructions  Problem List Items Addressed This Visit          Cardiac and Vasculature    Mixed hyperlipidemia (Chronic)    Overview     Lifestyle control.         Current Assessment & Plan     He will continue lifestyle control with low-fat diet and regular exercise.         Relevant Orders    Lipid Panel (Completed)    TSH (Completed)    Vitamin B12 (Completed)    Bradycardia (Chronic)    Overview     Chronic bradycardia is asymptomatic.         Current Assessment & Plan     We will continue to monitor.         Benign essential hypertension    Overview     Taking olmesartan daily and doxazosin every evening.           Current Assessment & Plan     Continue Olmesartan and doxazosin. Continue to avoid salt in the diet.         Relevant Medications    olmesartan (Benicar) 20 MG tablet    doxazosin (CARDURA) 4 MG tablet    Other Relevant Orders    CBC & Differential (Completed)    Comprehensive Metabolic Panel (Completed)    Microalbumin / Creatinine Urine Ratio - Urine, Clean Catch (Completed)    Urinalysis With Microscopic - Urine, Clean Catch (Completed)    Chronic atrial fibrillation    Overview     Anticoagulation with Coumadin and regular PT/INR checks.           Current Assessment & Plan     He will continue warfarin and continue PT/INR every month.         Relevant Orders    ECG 12 Lead    Varicose veins of lower extremity    Overview     Varicose veins with edema             Current Assessment & Plan     He will continue wearing support hose daily.            Coag and Thromboembolic    Anticoagulated on Coumadin    Overview      regular PT/INR.         Current Assessment & Plan     He will continue regular PT/INR checks.            Endocrine and Metabolic    Vitamin D deficiency (Chronic)    Current Assessment & Plan     Continue current dose of 1000 units vitamin D3 daily.         Relevant Orders    Vitamin D,25-Hydroxy (Completed)       Genitourinary and Reproductive     Benign prostatic hyperplasia  without urinary obstruction    Overview     doxazosin every evening.         Current Assessment & Plan     He will continue doxazosin nightly.            Health Encounters    Annual physical exam    Current Assessment & Plan     He is up to date on immunizations. He is up to date on colonoscopy, which was discontinued by his gastroenterologist after the one in 04/2023.         Medicare annual wellness visit, subsequent - Primary    Current Assessment & Plan     He is up to date on immunizations. He is up to date on colonoscopy, which was discontinued by his gastroenterologist after the one in 04/2023.            Musculoskeletal and Injuries    Personal history of gout (Chronic)    Overview     Taking allopurinol 100mg daily.         Current Assessment & Plan     He continues to have a normal uric acid level. He will continue allopurinol daily.         Relevant Orders    Uric Acid (Completed)       Skin    Skin lesion of face    Current Assessment & Plan     We are referring him to Dr. Holly Gray and Holly is with [facility].         Relevant Orders    Ambulatory Referral to Dermatology (Completed)       Other    Overweight with body mass index (BMI) of 29 to 29.9 in adult (Chronic)    Current Assessment & Plan     His goal weight is 210 pounds. He could lose about 4 pounds per month by increasing physical activity and getting more aerobic exercise. He will continue walking and playing golf. He could also work out at the gym 3 or 4 days a week. He will also decrease portion sizes at mealtime and decrease carbohydrates and fats in the diet. Eat more vegetables. Drink a big glass of water, but 4 meals to promote a feeling of satiety.          Other Visit Diagnoses       Prostate cancer screening        Relevant Orders    PSA Screen           BMI for Adults  What is BMI?  Body mass index (BMI) is a number that is calculated from a person's weight and height. BMI can help estimate how much of a person's weight is composed of  "fat. BMI does not measure body fat directly. Rather, it is an alternative to procedures that directly measure body fat, which can be difficult and expensive.  BMI can help identify people who may be at higher risk for certain medical problems.  What are BMI measurements used for?  BMI is used as a screening tool to identify possible weight problems. It helps determine whether a person is obese, overweight, a healthy weight, or underweight.  BMI is useful for:  Identifying a weight problem that may be related to a medical condition or may increase the risk for medical problems.  Promoting changes, such as changes in diet and exercise, to help reach a healthy weight. BMI screening can be repeated to see if these changes are working.  How is BMI calculated?  BMI involves measuring your weight in relation to your height. Both height and weight are measured, and the BMI is calculated from those numbers. This can be done either in English ([place] or metric measurements. Note that charts and online BMI calculators are available to help you find your BMI quickly and easily without having to do these calculations yourself.  To calculate your BMI in English ([place] measurements:    Measure your weight in pounds (lb).  Multiply the number of pounds by 703.  For example, for a person who weighs 180 lb, multiply that number by 703, which equals [phone].  Measure your height in inches. Then multiply that number by itself to get a measurement called \"inches squared.\"  For example, for a person who is 70 inches tall, the \"inches squared\" measurement is 70 inches x 70 inches, which equals 4,900 inches squared.  Divide the total from step 2 (number of lb x 703) by the total from step 3 (inches squared): 126,540 ö 4,900 = 25.8. This is your BMI.  To calculate your BMI in metric measurements:  Measure your weight in kilograms (kg).  Measure your height in meters (m). Then multiply that number by itself to get a measurement called \"meters " "squared.\"  For example, for a person who is 1.75 m tall, the \"meters squared\" measurement is 1.75 m x 1.75 m, which is equal to 3.1 meters squared.  Divide the number of kilograms (your weight) by the meters squared number. In this example: 70 ö 3.1 = 22.6. This is your BMI.  What do the results mean?  BMI charts are used to identify whether you are underweight, normal weight, overweight, or obese. The following guidelines will be used:  Underweight: BMI less than 18.5.  Normal weight: BMI between 18.5 and 24.9.  Overweight: BMI between 25 and 29.9.  Obese: BMI of 30 or above.  Keep these notes in mind:  Weight includes both fat and muscle, so someone with a muscular build, such as an athlete, may have a BMI that is higher than 24.9. In cases like these, BMI is not an accurate measure of body fat.  To determine if excess body fat is the cause of a BMI of 25 or higher, further assessments may need to be done by a health care provider.  BMI is usually interpreted in the same way for men and women.  Where to find more information  For more information about BMI, including tools to quickly calculate your BMI, go to these websites:  Centers for Disease Control and Prevention: www.cdc.gov  American Heart Association: www.heart.org  National Heart, Lung, and Blood [place]: www.nhlbi.nih.gov  Summary  Body mass index (BMI) is a number that is calculated from a person's weight and height.  BMI may help estimate how much of a person's weight is composed of fat. BMI can help identify those who may be at higher risk for certain medical problems.  BMI can be measured using English measurements or metric measurements.  BMI charts are used to identify whether you are underweight, normal weight, overweight, or obese.  This information is not intended to replace advice given to you by your health care provider. Make sure you discuss any questions you have with your health care provider.  Document Revised: 09/09/2020 Document " Reviewed: 07/17/2020  [employer] Patient Education c 2023 [employer]  Exercising to Lose Weight  Getting regular exercise is important for everyone. It is especially important if you are overweight. Being overweight increases your risk of heart disease, stroke, diabetes, high blood pressure, and several types of cancer. Exercising, and reducing the calories you consume, can help you lose weight and improve fitness and health.  Exercise can be moderate or vigorous intensity. To lose weight, most people need to do a certain amount of moderate or vigorous-intensity exercise each week.  How can exercise affect me?  You lose weight when you exercise enough to burn more calories than you eat. Exercise also reduces body fat and builds muscle. The more muscle you have, the more calories you burn. Exercise also:  Improves mood.  Reduces stress and tension.  Improves your overall fitness, flexibility, and endurance.  Increases bone strength.  Moderate-intensity exercise  A person riding a bicycle wearing a safety helmet.      Moderate-intensity exercise is any activity that gets you moving enough to burn at least three times more energy (calories) than if you were sitting.  Examples of moderate exercise include:  Walking a mile in 15 minutes.  Doing light yard work.  Biking at an easy pace.  Most people should get at least 150 minutes of moderate-intensity exercise a week to maintain their body weight.  Vigorous-intensity exercise  Vigorous-intensity exercise is any activity that gets you moving enough to burn at least six times more calories than if you were sitting. When you exercise at this intensity, you should be working hard enough that you are not able to carry on a conversation.  Examples of vigorous exercise include:  Running.  Playing a team sport, such as football, basketball, and soccer.  Jumping rope.  Most people should get at least 75 minutes a week of vigorous exercise to maintain their body weight.  What  actions can I take to lose weight?  The amount of exercise you need to lose weight depends on:  Your age.  The type of exercise.  Any health conditions you have.  Your overall physical ability.  Talk to your health care provider about how much exercise you need and what types of activities are safe for you.  Nutrition  A plate with healthy, colorful foods.      Make changes to your diet as told by your health care provider or diet and nutrition specialist (dietitian). This may include:  Eating fewer calories.  Eating more protein.  Eating less unhealthy fats.  Eating a diet that includes fresh fruits and vegetables, whole grains, low-fat dairy products, and lean protein.  Avoiding foods with added fat, salt, and sugar.  Drink plenty of water while you exercise to prevent dehydration or heat stroke.  Activity  Choose an activity that you enjoy and set realistic goals. Your health care provider can help you make an exercise plan that works for you.  Exercise at a moderate or vigorous intensity most days of the week.  The intensity of exercise may vary from person to person. You can tell how intense a workout is for you by paying attention to your breathing and heartbeat. Most people will notice their breathing and heartbeat get faster with more intense exercise.  Do resistance training twice each week, such as:  Push-ups.  Sit-ups.  Lifting weights.  Using resistance bands.  Getting short amounts of exercise can be just as helpful as long, structured periods of exercise. If you have trouble finding time to exercise, try doing these things as part of your daily routine:  Get up, stretch, and walk around every 30 minutes throughout the day.  Go for a walk during your lunch break.  Park your car farther away from your destination.  If you take public transportation, get off one stop early and walk the rest of the way.  Make phone calls while standing up and walking around.  Take the stairs instead of elevators or  escalators.  Wear comfortable clothes and shoes with good support.  Do not exercise so much that you hurt yourself, feel dizzy, or get very short of breath.  Where to find more information  [place] Department of Health and Human Services: www.hhs.gov  Centers for Disease Control and Prevention: www.cdc.gov  Contact a health care provider:  Before starting a new exercise program.  If you have questions or concerns about your weight.  If you have a medical problem that keeps you from exercising.  Get help right away if:  You have any of the following while exercising:  Injury.  Dizziness.  Difficulty breathing or shortness of breath that does not go away when you stop exercising.  Chest pain.  Rapid heartbeat.  These symptoms may represent a serious problem that is an emergency. Do not wait to see if the symptoms will go away. Get medical help right away. Call your local emergency services (911 in the [place] Do not drive yourself to the hospital.  Summary  Getting regular exercise is especially important if you are overweight.  Being overweight increases your risk of heart disease, stroke, diabetes, high blood pressure, and several types of cancer.  Losing weight happens when you burn more calories than you eat.  Reducing the amount of calories you eat, and getting regular moderate or vigorous exercise each week, helps you lose weight.  This information is not intended to replace advice given to you by your health care provider. Make sure you discuss any questions you have with your health care provider. Heart-Healthy Eating Plan  Many factors influence your heart (coronary) health, including eating and exercise habits. Coronary risk increases with abnormal blood fat (lipid) levels. Heart-healthy meal planning includes limiting unhealthy fats, increasing healthy fats, and making other diet and lifestyle changes.  What is my plan?  Your health care provider may recommend that you:  Limit your fat intake to _________% or  less of your total calories each day.  Limit your saturated fat intake to _________% or less of your total calories each day.  Limit the amount of cholesterol in your diet to less than _________ mg per day.  What are tips for following this plan?  Cooking  Cook foods using methods other than frying. Baking, boiling, grilling, and broiling are all good options. Other ways to reduce fat include:  Removing the skin from poultry.  Removing all visible fats from meats.  Steaming vegetables in water or broth.  Meal planning  A plate with examples of foods in a healthy diet.      At meals, imagine dividing your plate into fourths:  Fill one-half of your plate with vegetables and green salads.  Fill one-fourth of your plate with whole grains.  Fill one-fourth of your plate with lean protein foods.  Eat 4-5 servings of vegetables per day. One serving equals 1 cup raw or cooked vegetable, or 2 cups raw leafy greens.  Eat 4-5 servings of fruit per day. One serving equals 1 medium whole fruit, ¬ cup dried fruit, « cup fresh, frozen, or canned fruit, or « cup 100% fruit juice.  Eat more foods that contain soluble fiber. Examples include apples, broccoli, carrots, beans, peas, and barley. Aim to get 25-30 g of fiber per day.  Increase your consumption of legumes, nuts, and seeds to 4-5 servings per week. One serving of dried beans or legumes equals « cup cooked, 1 serving of nuts is ¬ cup, and 1 serving of seeds equals 1 tablespoon.  Fats  Choose healthy fats more often. Choose monounsaturated and polyunsaturated fats, such as olive and canola oils, flaxseeds, walnuts, almonds, and seeds.  Eat more omega-3 fats. Choose salmon, mackerel, sardines, tuna, flaxseed oil, and ground flaxseeds. Aim to eat fish at least 2 times each week.  Check food labels carefully to identify foods with trans fats or high amounts of saturated fat.  Limit saturated fats. These are found in animal products, such as meats, butter, and cream. Plant  sources of saturated fats include palm oil, palm kernel oil, and coconut oil.  Avoid foods with partially hydrogenated oils in them. These contain trans fats. Examples are stick margarine, some tub margarines, cookies, crackers, and other baked goods.  Avoid fried foods.  General information  Eat more home-cooked food and less restaurant, buffet, and fast food.  Limit or avoid alcohol.  Limit foods that are high in starch and sugar.  Lose weight if you are overweight. Losing just 5-10% of your body weight can help your overall health and prevent diseases such as diabetes and heart disease.  Monitor your salt (sodium) intake, especially if you have high blood pressure. Talk with your health care provider about your sodium intake.  Try to incorporate more vegetarian meals weekly.  What foods can I eat?  Fruits  All fresh, canned (in natural juice), or frozen fruits.  Vegetables  Fresh or frozen vegetables (raw, steamed, roasted, or grilled). Green salads.  Grains  Most grains. Choose whole wheat and whole grains most of the time. Rice and pasta, including brown rice and pastas made with whole wheat.  Meats and other proteins  Lean, well-trimmed beef, veal, pork, and lamb. Chicken and turkey without skin. All fish and shellfish. Wild duck, rabbit, pheasant, and venison. Egg whites or low-cholesterol egg substitutes. Dried beans, peas, lentils, and tofu. Seeds and most nuts.  Dairy  Low-fat or nonfat cheeses, including ricotta and mozzarella. Skim or 1% milk (liquid, powdered, or evaporated). Buttermilk made with low-fat milk. Nonfat or low-fat yogurt.  Fats and oils  Non-hydrogenated (trans-free) margarines. Vegetable oils, including soybean, sesame, sunflower, olive, peanut, safflower, corn, canola, and cottonseed. Salad dressings or mayonnaise made with a vegetable oil.  Beverages  Water (mineral or sparkling). Coffee and tea. Diet carbonated beverages.  Sweets and desserts  Sherbet, gelatin, and fruit ice. Small  amounts of dark chocolate.  Limit all sweets and desserts.  Seasonings and condiments  All seasonings and condiments.  The items listed above may not be a complete list of foods and beverages you can eat. Contact a dietitian for more options.  What foods are not recommended?  Fruits  Canned fruit in heavy syrup. Fruit in cream or butter sauce. Fried fruit. Limit coconut.  Vegetables  Vegetables cooked in cheese, cream, or butter sauce. Fried vegetables.  Grains  Breads made with saturated or trans fats, oils, or whole milk. Croissants. Sweet rolls. Donuts. High-fat crackers, such as cheese crackers.  Meats and other proteins  Fatty meats, such as hot dogs, ribs, sausage, vargas, rib-eye roast or steak. High-fat deli meats, such as salami and bologna. Caviar. Domestic duck and goose. Organ meats, such as liver.  Dairy  Cream, sour cream, cream cheese, and creamed cottage cheese. Whole-milk cheeses. Whole or 2% milk (liquid, evaporated, or condensed). Whole buttermilk. Cream sauce or high-fat cheese sauce. Whole-milk yogurt.  Fats and oils  Meat fat, or shortening. Cocoa butter, hydrogenated oils, palm oil, coconut oil, palm kernel oil. Solid fats and shortenings, including vargas fat, salt pork, lard, and butter. Nondairy cream substitutes. Salad dressings with cheese or sour cream.  Beverages  Regular sodas and any drinks with added sugar.  Sweets and desserts  Frosting. Pudding. Cookies. Cakes. Pies. Milk chocolate or white chocolate. Buttered syrups. Full-fat ice cream or ice cream drinks.  The items listed above may not be a complete list of foods and beverages to avoid. Contact a dietitian for more information.  Summary  Heart-healthy meal planning includes limiting unhealthy fats, increasing healthy fats, and making other diet and lifestyle changes.  Lose weight if you are overweight. Losing just 5-10% of your body weight can help your overall health and prevent diseases such as diabetes and heart disease.  Focus  on eating a balance of foods, including fruits and vegetables, low-fat or nonfat dairy, lean protein, nuts and legumes, whole grains, and heart-healthy oils and fats.  This information is not intended to replace advice given to you by your health care provider. Make sure you discuss any questions you have with your health care provider.  Document Revised: 04/28/2022 Document Reviewed: 04/28/2022  [employer] Patient Education c 2022 [employer]

## 2023-08-24 RX ORDER — WARFARIN SODIUM 5 MG/1
TABLET ORAL
Qty: 30 TABLET | Refills: 1 | Status: SHIPPED | OUTPATIENT
Start: 2023-08-24

## 2023-08-24 NOTE — TELEPHONE ENCOUNTER
Rx Refill Note  Requested Prescriptions     Pending Prescriptions Disp Refills    warfarin (Coumadin) 5 MG tablet 30 tablet 5     Sig: Take one tablet by mouth daily (one 4mg and one 5mg totaling 9mg daily)      Last office visit with prescribing clinician: 8/9/2023   Last telemedicine visit with prescribing clinician: Visit date not found   Next office visit with prescribing clinician: 2/9/2024                         Would you like a call back once the refill request has been completed: [] Yes [] No    If the office needs to give you a call back, can they leave a voicemail: [] Yes [] No    Michaelle Xiong LPN  08/24/23, 08:08 EDT

## 2023-09-07 ENCOUNTER — LAB (OUTPATIENT)
Dept: LAB | Facility: HOSPITAL | Age: 78
End: 2023-09-07
Payer: MEDICARE

## 2023-09-07 DIAGNOSIS — Z79.01 ANTICOAGULATED ON COUMADIN: ICD-10-CM

## 2023-09-07 DIAGNOSIS — Z51.81 ENCOUNTER FOR THERAPEUTIC DRUG MONITORING: Primary | ICD-10-CM

## 2023-09-07 DIAGNOSIS — Z12.5 PROSTATE CANCER SCREENING: ICD-10-CM

## 2023-09-07 LAB
INR PPP: 2.43 (ref 0.89–1.12)
PROTHROMBIN TIME: 26.6 SECONDS (ref 12.2–14.5)
PSA SERPL-MCNC: 2.24 NG/ML (ref 0–4)

## 2023-09-07 PROCEDURE — 85610 PROTHROMBIN TIME: CPT

## 2023-09-07 PROCEDURE — G0103 PSA SCREENING: HCPCS

## 2023-09-07 PROCEDURE — 36415 COLL VENOUS BLD VENIPUNCTURE: CPT

## 2023-10-02 ENCOUNTER — LAB (OUTPATIENT)
Dept: LAB | Facility: HOSPITAL | Age: 78
End: 2023-10-02
Payer: MEDICARE

## 2023-10-02 DIAGNOSIS — Z51.81 ENCOUNTER FOR THERAPEUTIC DRUG MONITORING: Primary | ICD-10-CM

## 2023-10-02 DIAGNOSIS — Z79.01 ANTICOAGULATED ON COUMADIN: ICD-10-CM

## 2023-10-02 LAB
INR PPP: 2.49 (ref 0.89–1.12)
PROTHROMBIN TIME: 27.1 SECONDS (ref 12.2–14.5)

## 2023-10-02 PROCEDURE — 36415 COLL VENOUS BLD VENIPUNCTURE: CPT

## 2023-10-02 PROCEDURE — 85610 PROTHROMBIN TIME: CPT

## 2023-10-11 DIAGNOSIS — R42 DIZZINESS: ICD-10-CM

## 2023-10-11 DIAGNOSIS — G44.52 NEW DAILY PERSISTENT HEADACHE: Primary | ICD-10-CM

## 2023-11-02 ENCOUNTER — LAB (OUTPATIENT)
Dept: LAB | Facility: HOSPITAL | Age: 78
End: 2023-11-02
Payer: MEDICARE

## 2023-11-02 DIAGNOSIS — Z51.81 ENCOUNTER FOR THERAPEUTIC DRUG MONITORING: ICD-10-CM

## 2023-11-02 DIAGNOSIS — Z79.01 ANTICOAGULATED ON COUMADIN: ICD-10-CM

## 2023-11-02 LAB
INR PPP: 2.89 (ref 0.89–1.12)
PROTHROMBIN TIME: 30.4 SECONDS (ref 12.2–14.5)

## 2023-11-02 PROCEDURE — 85610 PROTHROMBIN TIME: CPT

## 2023-11-02 PROCEDURE — 36415 COLL VENOUS BLD VENIPUNCTURE: CPT

## 2023-11-02 RX ORDER — WARFARIN SODIUM 4 MG/1
TABLET ORAL
Qty: 90 TABLET | Refills: 1 | Status: SHIPPED | OUTPATIENT
Start: 2023-11-02

## 2023-11-02 NOTE — TELEPHONE ENCOUNTER
Rx Refill Note  Requested Prescriptions     Pending Prescriptions Disp Refills    warfarin (COUMADIN) 4 MG tablet [Pharmacy Med Name: WARFARIN SODIUM 4 MG TABLET] 90 tablet      Sig: TAKE 1 TABLET BY MOUTH DAILY TOTAL DOSE OF 9MG      Last office visit with prescribing clinician: 8/9/2023   Last telemedicine visit with prescribing clinician: Visit date not found   Next office visit with prescribing clinician: 2/9/2024                         Would you like a call back once the refill request has been completed: [] Yes [] No    If the office needs to give you a call back, can they leave a voicemail: [] Yes [] No    Michaelle Xiong LPN  11/02/23, 10:45 EDT

## 2023-11-30 ENCOUNTER — LAB (OUTPATIENT)
Dept: LAB | Facility: HOSPITAL | Age: 78
End: 2023-11-30
Payer: MEDICARE

## 2023-11-30 DIAGNOSIS — Z51.81 THERAPEUTIC DRUG MONITORING: Primary | ICD-10-CM

## 2023-11-30 DIAGNOSIS — Z79.01 ANTICOAGULATED ON WARFARIN: ICD-10-CM

## 2023-12-01 ENCOUNTER — LAB (OUTPATIENT)
Dept: LAB | Facility: HOSPITAL | Age: 78
End: 2023-12-01
Payer: MEDICARE

## 2023-12-01 DIAGNOSIS — Z51.81 THERAPEUTIC DRUG MONITORING: ICD-10-CM

## 2023-12-01 DIAGNOSIS — Z79.01 ANTICOAGULATED ON WARFARIN: ICD-10-CM

## 2023-12-01 LAB
INR PPP: 2.95 (ref 0.89–1.12)
PROTHROMBIN TIME: 31 SECONDS (ref 12.2–14.5)

## 2023-12-01 PROCEDURE — 85610 PROTHROMBIN TIME: CPT

## 2023-12-28 ENCOUNTER — LAB (OUTPATIENT)
Dept: LAB | Facility: HOSPITAL | Age: 78
End: 2023-12-28
Payer: MEDICARE

## 2023-12-28 DIAGNOSIS — Z79.01 ANTICOAGULATED ON WARFARIN: ICD-10-CM

## 2023-12-28 DIAGNOSIS — Z51.81 THERAPEUTIC DRUG MONITORING: ICD-10-CM

## 2023-12-28 LAB
INR PPP: 2.45 (ref 0.89–1.12)
PROTHROMBIN TIME: 26.7 SECONDS (ref 12.2–14.5)

## 2023-12-28 PROCEDURE — 85610 PROTHROMBIN TIME: CPT

## 2024-01-23 DIAGNOSIS — K40.91 UNILATERAL RECURRENT INGUINAL HERNIA WITHOUT OBSTRUCTION OR GANGRENE: Primary | ICD-10-CM

## 2024-01-25 ENCOUNTER — LAB (OUTPATIENT)
Dept: LAB | Facility: HOSPITAL | Age: 79
End: 2024-01-25
Payer: MEDICARE

## 2024-01-25 DIAGNOSIS — Z79.01 ANTICOAGULATED ON WARFARIN: ICD-10-CM

## 2024-01-25 DIAGNOSIS — Z51.81 THERAPEUTIC DRUG MONITORING: ICD-10-CM

## 2024-01-25 LAB
INR PPP: 2.43 (ref 0.89–1.12)
PROTHROMBIN TIME: 26.6 SECONDS (ref 12.2–14.5)

## 2024-01-25 PROCEDURE — 85610 PROTHROMBIN TIME: CPT

## 2024-02-09 ENCOUNTER — OFFICE VISIT (OUTPATIENT)
Dept: INTERNAL MEDICINE | Facility: CLINIC | Age: 79
End: 2024-02-09
Payer: MEDICARE

## 2024-02-09 VITALS
SYSTOLIC BLOOD PRESSURE: 160 MMHG | HEIGHT: 72 IN | TEMPERATURE: 97.8 F | BODY MASS INDEX: 31.18 KG/M2 | DIASTOLIC BLOOD PRESSURE: 66 MMHG | OXYGEN SATURATION: 98 % | HEART RATE: 53 BPM | WEIGHT: 230.2 LBS

## 2024-02-09 DIAGNOSIS — E78.2 MIXED HYPERLIPIDEMIA: Chronic | ICD-10-CM

## 2024-02-09 DIAGNOSIS — E55.9 VITAMIN D DEFICIENCY: Chronic | ICD-10-CM

## 2024-02-09 DIAGNOSIS — I10 BENIGN ESSENTIAL HYPERTENSION: Primary | ICD-10-CM

## 2024-02-09 DIAGNOSIS — K40.90 INGUINAL HERNIA OF RIGHT SIDE WITHOUT OBSTRUCTION OR GANGRENE: Chronic | ICD-10-CM

## 2024-02-09 DIAGNOSIS — I48.20 CHRONIC ATRIAL FIBRILLATION: ICD-10-CM

## 2024-02-09 DIAGNOSIS — Z79.01 ANTICOAGULATED ON WARFARIN: ICD-10-CM

## 2024-02-09 RX ORDER — DOXAZOSIN MESYLATE 4 MG/1
4 TABLET ORAL NIGHTLY
Qty: 90 TABLET | Refills: 3 | Status: SHIPPED | OUTPATIENT
Start: 2024-02-09

## 2024-02-09 RX ORDER — ALLOPURINOL 100 MG/1
100 TABLET ORAL DAILY
Qty: 90 TABLET | Refills: 3 | Status: SHIPPED | OUTPATIENT
Start: 2024-02-09

## 2024-02-09 RX ORDER — OLMESARTAN MEDOXOMIL 40 MG/1
40 TABLET ORAL DAILY
Qty: 90 TABLET | Refills: 1 | Status: SHIPPED | OUTPATIENT
Start: 2024-02-09

## 2024-02-09 NOTE — ASSESSMENT & PLAN NOTE
We will increase olmesartan to 40 mg daily. Continue doxazosin 4 mg nightly. Continue to avoid salt in the diet. Increase regular walking and exercise. He will check blood pressures at home and let us know in about 3 weeks how it is running.

## 2024-02-09 NOTE — PROGRESS NOTES
[facility]     Uriel Valverde  1945   [id number]      Patient Care Team:  Barbi Carlson MD as PCP - General (Internal Medicine)    Chief Complaint   Patient presents with    Hyperlipidemia     F/u            HPI  Patient is a 78 y.o. male presents today for a follow-up visit.    Basal cell carcinoma  The patient had a basal cell carcinoma removed from his left cheek approximately 4 weeks ago. It has completely healed.     Inguinal hernia  The patient has a inguinal hernia on the right side of the abdomen. He was referred to see a surgeon and has an appointment on 02/20/2024. He denies any enlargement since when he called. The only time he feels like it strains is when he plays golf, but he wears a truss, which helps. He does not lift anything heavy. He denies any constipation or diarrhea.    Hypertension  The patient's blood pressure is elevated today at 172/72 mmHg. He checks his blood pressure at home occasionally, but not lately. He is taking olmesartan 20 mg and doxazosin. He has been on doxazosin for at least a couple of years. He denies any lower extremity edema. On recheck, his blood pressure was 160/60 mmHg.    Hyperlipidemia  The patient has gained 3 pounds since his last visit. He started a protein and vegetable diet approximately 2 weeks ago. He walks about half the time when he plays golf.     Prostate symptoms  The patient denies any prostate symptoms.    Atrial fibrillation  The patient is currently taking warfarin. He denies any signs of bleeding from his warfarin.    Gout  The patient denies any gout symptoms. He states the allopurinol seemed to completely resolve it.    Health maintenance  The patient's last blood work was done in 08/2023.    Immunizations  The patient is up to date on his immunizations.    CHRONIC CONDITIONS      Past Medical History:   Diagnosis Date    A-fib 02/11/2004    chronic coumadin; failed cardioversion; normal echo and exercise nuclear stress  test.  He opted not to have ablation.    A-fib     failed cardioversion, normal echo and exercise nuclear stress test.  He opted not to have ablation    Abnormal CT scan, lung 02/09/2017    RLL opacity with air bronchograms     Allergic Soy    Wheat    Allergic rhinitis     Allergy to wheat     as well as soybean    Anemia     BPH (benign prostatic hyperplasia)     Cobalamin deficiency 11/15/2016    Cough 2015    sec/to inflammatory pnuemonitis    Elbow effusion, left 09/2015    presumed sec/to gout    Gout 2015    right ankle and righ knee (also of right hand-remote); colchicine prn.Uric acid lowering with allopurinol    H/O eye surgery 2003    Hayfever     as child; resolved when family moved to [place] from [place]    Hypertension     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 Rod Whitten    Macrocytic anemia 05/09/2016    Medial meniscus tear 2003    right; arthroscopy knee    Mild anemia 07/22/2022    Sensitivity to sunlight     sun sensitivity rash; sun avoidance, for many years    Shoulder fracture, right     childhood    Spongiotic dermatitis     Tonsillitis     childhood; Tonsillectomy    Vitamin B12 deficiency     Vitamin D deficiency 09/08/2010       Past Surgical History:   Procedure Laterality Date    COLONOSCOPY      EYE SURGERY  2/1/2023    Cataract Removal - both    HERNIA REPAIR      INGUINAL HERNIA REPAIR  1995    KNEE ARTHROSCOPY Right 2003    due to meniscus tear    TONSILLECTOMY      as a child    VASECTOMY         Family History   Problem Relation Age of Onset    Heart failure Father     Coronary artery disease Father         COD    Stroke Sister 63    Cancer Sister         Lung Cancer       Social History  "    Socioeconomic History    Marital status:    Tobacco Use    Smoking status: Former     Packs/day: 0.50     Years: 22.00     Additional pack years: 0.00     Total pack years: 11.00     Types: Cigarettes     Start date: 1966     Quit date: 1978     Years since quittin.1    Smokeless tobacco: Never    Tobacco comments:     no passive smoke exposure, quit 79   Substance and Sexual Activity    Alcohol use: Yes     Alcohol/week: 2.0 standard drinks of alcohol     Types: 2 Glasses of wine per week     Comment: daily    Drug use: No    Sexual activity: Not Currently     Birth control/protection: Vasectomy       Allergies   Allergen Reactions    Other Rash     Soy, mold, and wheat       Review of Systems:   The appropriate review of systems is included in the HPI.    Vital Signs  Vitals:    24 1333 24 1355   BP: 172/72 160/66   BP Location: Left arm Left arm   Patient Position: Sitting Sitting   Cuff Size: Adult Adult   Pulse: 53    Temp: 97.8 °F (36.6 °C)    TempSrc: Infrared    SpO2: 98%    Weight: 104 kg (230 lb 3.2 oz)    Height: 183.7 cm (72.32\")      Body mass index is 30.94 kg/m².  BMI is >= 30 and <35. (Class 1 Obesity). The following options were offered after discussion;: weight loss educational material (shared in after visit summary), exercise counseling/recommendations, nutrition counseling/recommendations, and Information on healthy weight added to patient's after visit summary.        Current Outpatient Medications:     allopurinol (ZYLOPRIM) 100 MG tablet, Take 1 tablet by mouth Daily., Disp: 90 tablet, Rfl: 3    cholecalciferol (Vitamin D, Cholecalciferol,) 25 MCG (1000 UT) tablet, Take 1 tablet by mouth Daily., Disp: 60 tablet, Rfl: 5    doxazosin (CARDURA) 4 MG tablet, Take 1 tablet by mouth Every Night., Disp: 90 tablet, Rfl: 3    fexofenadine (ALLEGRA) 180 MG tablet, Take 1 tablet by mouth Daily., Disp: , Rfl:     folic acid (FOLVITE) 1 MG tablet, Take 1 tablet " by mouth Daily., Disp: , Rfl:     olmesartan (Benicar) 40 MG tablet, Take 1 tablet by mouth Daily., Disp: 90 tablet, Rfl: 1    vitamin B-12 (CYANOCOBALAMIN) 500 MCG tablet, Take 1 tablet by mouth Daily., Disp: 90 tablet, Rfl: 1    warfarin (COUMADIN) 4 MG tablet, TAKE 1 TABLET BY MOUTH DAILY TOTAL DOSE OF 9MG, Disp: 90 tablet, Rfl: 1    warfarin (Coumadin) 5 MG tablet, Take one tablet by mouth daily (one 4mg and one 5mg totaling 9mg daily), Disp: 30 tablet, Rfl: 1    Physical Exam:    Physical Exam  Vitals and nursing note reviewed.   Constitutional:       Appearance: Normal appearance. He is well-developed.      Comments: Mild overweight.   HENT:      Head: Normocephalic.   Eyes:      Conjunctiva/sclera: Conjunctivae normal.      Pupils: Pupils are equal, round, and reactive to light.   Neck:      Thyroid: No thyromegaly.   Cardiovascular:      Rate and Rhythm: Normal rate and regular rhythm.      Heart sounds: Normal heart sounds.      Comments: No edema.  Pulmonary:      Effort: Pulmonary effort is normal.      Breath sounds: Normal breath sounds. No wheezing.   Abdominal:      Comments: Large inguinal hernia on the right, not reducible, nontender. It measures about 8 to 9 cm.    Musculoskeletal:         General: Normal range of motion.      Cervical back: Normal range of motion and neck supple.   Lymphadenopathy:      Cervical: No cervical adenopathy.   Skin:     General: Skin is warm and dry.   Neurological:      Mental Status: He is alert and oriented to person, place, and time.   Psychiatric:         Thought Content: Thought content normal.        ACE III MINI        Results Review:    None    CMP:  Lab Results   Component Value Date    BUN 22 08/08/2023    CREATININE 1.17 08/08/2023    EGFRIFNONA 71 01/03/2022    BCR 18.8 08/08/2023     08/08/2023    K 4.5 08/08/2023    CO2 24.7 08/08/2023    CALCIUM 9.6 08/08/2023    ALBUMIN 4.1 08/08/2023    BILITOT 0.6 08/08/2023    ALKPHOS 69 08/08/2023    AST 18  08/08/2023    ALT 15 08/08/2023     HbA1c:  Lab Results   Component Value Date    HGBA1C 5.80 (H) 07/19/2022     Microalbumin:  Lab Results   Component Value Date    MICROALBUR <1.2 08/08/2023     Lipid Panel  Lab Results   Component Value Date    CHOL 199 08/08/2023    TRIG 43 08/08/2023    HDL 75 (H) 08/08/2023     (H) 08/08/2023    AST 18 08/08/2023    ALT 15 08/08/2023       Medication Review: Medications reviewed and noted  Patient Instructions   Problem List Items Addressed This Visit          Cardiac and Vasculature    Mixed hyperlipidemia (Chronic)    Overview     Lifestyle control.         Current Assessment & Plan     We will recheck fasting lipids next week. He will continue to improve low-fat, low-carbohydrate diet and increase regular exercise and walking.         Relevant Orders    Lipid Panel    Homocysteine    TSH    Benign essential hypertension - Primary    Overview     Taking olmesartan daily and doxazosin every evening.           Current Assessment & Plan     We will increase olmesartan to 40 mg daily. Continue doxazosin 4 mg nightly. Continue to avoid salt in the diet. Increase regular walking and exercise. He will check blood pressures at home and let us know in about 3 weeks how it is running.         Relevant Medications    doxazosin (CARDURA) 4 MG tablet    olmesartan (Benicar) 40 MG tablet    Other Relevant Orders    CBC & Differential    Comprehensive Metabolic Panel    Microalbumin / Creatinine Urine Ratio - Urine, Clean Catch    Urinalysis With Microscopic - Urine, Clean Catch    Chronic atrial fibrillation    Overview     Anticoagulation with Coumadin and regular PT/INR checks.           Current Assessment & Plan     Continue Coumadin with regular PT/INR checks.            Coag and Thromboembolic    Anticoagulated on warfarin    Overview      regular PT/INR.            Endocrine and Metabolic    Vitamin D deficiency (Chronic)    Current Assessment & Plan     Continue current dose  of vitamin D3 daily.         Relevant Orders    Vitamin D,25-Hydroxy       Gastrointestinal Abdominal     Inguinal hernia of right side without obstruction or gangrene (Chronic)    Current Assessment & Plan     He has an appointment with the surgeon coming up on 02/20/2024.               Diagnosis Plan   1. Benign essential hypertension  olmesartan (Benicar) 40 MG tablet    CBC & Differential    Comprehensive Metabolic Panel    Microalbumin / Creatinine Urine Ratio - Urine, Clean Catch    Urinalysis With Microscopic - Urine, Clean Catch      2. Mixed hyperlipidemia  Lipid Panel    Homocysteine    TSH      3. Inguinal hernia of right side without obstruction or gangrene        4. Anticoagulated on warfarin        5. Chronic atrial fibrillation        6. Vitamin D deficiency  Vitamin D,25-Hydroxy              Follow-up: He will come back to see me in 6 months for annual wellness visit.    Plan of care reviewed with patient at the conclusion of today's visit. Education was provided regarding diagnosis, management, and any prescribed or recommended OTC medications.Patient verbalizes understanding of and agreement with management plan.         Transcribed from ambient dictation for Barbi Carlson MD by Siomara Huerta.  02/09/24   14:39 EST    Patient or patient representative verbalized consent to the visit recording.  I have personally performed the services described in this document as transcribed by the above individual, and it is both accurate and complete.

## 2024-02-09 NOTE — ASSESSMENT & PLAN NOTE
We will recheck fasting lipids next week. He will continue to improve low-fat, low-carbohydrate diet and increase regular exercise and walking.

## 2024-02-10 NOTE — PATIENT INSTRUCTIONS
Patient Instructions  Problem List Items Addressed This Visit          Cardiac and Vasculature    Mixed hyperlipidemia (Chronic)    Overview     Lifestyle control.         Current Assessment & Plan     We will recheck fasting lipids next week. He will continue to improve low-fat, low-carbohydrate diet and increase regular exercise and walking.         Relevant Orders    Lipid Panel    Homocysteine    TSH    Benign essential hypertension - Primary    Overview     Taking olmesartan daily and doxazosin every evening.           Current Assessment & Plan     We will increase olmesartan to 40 mg daily. Continue doxazosin 4 mg nightly. Continue to avoid salt in the diet. Increase regular walking and exercise. He will check blood pressures at home and let us know in about 3 weeks how it is running.         Relevant Medications    doxazosin (CARDURA) 4 MG tablet    olmesartan (Benicar) 40 MG tablet    Other Relevant Orders    CBC & Differential    Comprehensive Metabolic Panel    Microalbumin / Creatinine Urine Ratio - Urine, Clean Catch    Urinalysis With Microscopic - Urine, Clean Catch    Chronic atrial fibrillation    Overview     Anticoagulation with Coumadin and regular PT/INR checks.           Current Assessment & Plan     Continue Coumadin with regular PT/INR checks.            Coag and Thromboembolic    Anticoagulated on warfarin    Overview      regular PT/INR.            Endocrine and Metabolic    Vitamin D deficiency (Chronic)    Current Assessment & Plan     Continue current dose of vitamin D3 daily.         Relevant Orders    Vitamin D,25-Hydroxy       Gastrointestinal Abdominal     Inguinal hernia of right side without obstruction or gangrene (Chronic)    Current Assessment & Plan     He has an appointment with the surgeon coming up on 02/20/2024.            Exercising to Stay Healthy  To become healthy and stay healthy, it is recommended that you do moderate-intensity and vigorous-intensity exercise. You can  tell that you are exercising at a moderate intensity if your heart starts beating faster and you start breathing faster but can still hold a conversation. You can tell that you are exercising at a vigorous intensity if you are breathing much harder and faster and cannot hold a conversation while exercising.  How can exercise benefit me?  Exercising regularly is important. It has many health benefits, such as:  Improving overall fitness, flexibility, and endurance.  Increasing bone density.  Helping with weight control.  Decreasing body fat.  Increasing muscle strength and endurance.  Reducing stress and tension, anxiety, depression, or anger.  Improving overall health.  What guidelines should I follow while exercising?  Before you start a new exercise program, talk with your health care provider.  Do not exercise so much that you hurt yourself, feel dizzy, or get very short of breath.  Wear comfortable clothes and wear shoes with good support.  Drink plenty of water while you exercise to prevent dehydration or heat stroke.  Work out until your breathing and your heartbeat get faster (moderate intensity).  How often should I exercise?  Choose an activity that you enjoy, and set realistic goals. Your health care provider can help you make an activity plan that is individually designed and works best for you.  Exercise regularly as told by your health care provider. This may include:  Doing strength training two times a week, such as:  Lifting weights.  Using resistance bands.  Push-ups.  Sit-ups.  Yoga.  Doing a certain intensity of exercise for a given amount of time. Choose from these options:  A total of 150 minutes of moderate-intensity exercise every week.  A total of 75 minutes of vigorous-intensity exercise every week.  A mix of moderate-intensity and vigorous-intensity exercise every week.  Children, pregnant women, people who have not exercised regularly, people who are overweight, and older adults may need  to talk with a health care provider about what activities are safe to perform. If you have a medical condition, be sure to talk with your health care provider before you start a new exercise program.  What are some exercise ideas?  Moderate-intensity exercise ideas include:  Walking 1 mile (1.6 km) in about 15 minutes.  Biking.  Hiking.  Golfing.  Dancing.  Water aerobics.  Vigorous-intensity exercise ideas include:  Walking 4.5 miles (7.2 km) or more in about 1 hour.  Jogging or running 5 miles (8 km) in about 1 hour.  Biking 10 miles (16.1 km) or more in about 1 hour.  Lap swimming.  Roller-skating or in-line skating.  Cross-country skiing.  Vigorous competitive sports, such as football, basketball, and soccer.  Jumping rope.  Aerobic dancing.  What are some everyday activities that can help me get exercise?  Yard work, such as:  Pushing a .  Raking and bagging leaves.  Washing your car.  Pushing a stroller.  Shoveling snow.  Gardening.  Washing windows or floors.  How can I be more active in my day-to-day activities?  Use stairs instead of an elevator.  Take a walk during your lunch break.  If you drive, park your car farther away from your work or school.  If you take public transportation, get off one stop early and walk the rest of the way.  Stand up or walk around during all of your indoor phone calls.  Get up, stretch, and walk around every 30 minutes throughout the day.  Enjoy exercise with a friend. Support to continue exercising will help you keep a regular routine of activity.  Where to find more information  You can find more information about exercising to stay healthy from:  [place] Department of Health and Human Services: www.hhs.gov  Centers for Disease Control and Prevention (CDC): www.cdc.gov  Summary  Exercising regularly is important. It will improve your overall fitness, flexibility, and endurance.  Regular exercise will also improve your overall health. It can help you control your  "weight, reduce stress, and improve your bone density.  Do not exercise so much that you hurt yourself, feel dizzy, or get very short of breath.  Before you start a new exercise program, talk with your health care provider.  This information is not intended to replace advice given to you by your health care provider. Make sure you discuss any questions you have with your health care provider.  Document Revised: 04/15/2022 Document Reviewed: 04/15/2022  Elsevier Patient Education © 2023 [employer] BMI for Adults  Body mass index (BMI) is a number found using a person's weight and height. BMI can help tell how much of a person's weight is made up of fat. BMI does not measure body fat directly. It is used instead of tests that directly measure body fat, which can be difficult and expensive.  What are BMI measurements used for?  BMI is useful to:  Find out if your weight puts you at higher risk for medical problems.  Help recommend changes, such as in diet and exercise. This can help you reach a healthy weight. BMI screening can be done again to see if these changes are working.  How is BMI calculated?  Your height and weight are measured. The BMI is found from those numbers. This can be done with [place] or metric measurements. Note that charts and online BMI calculators are available to help you find your BMI quickly and easily without doing these calculations.  To calculate your BMI in [place] measurements:  Measure your weight in pounds (lb).  Multiply the number of pounds by 703.  So, for an adult who weighs 150 lb, multiply that number by 703: 150 x 703, which equals 105,450.  Measure your height in inches. Then multiply that number by itself to get a measurement called \"inches squared.\"  So, for an adult who is 70 inches tall, the \"inches squared\" measurement is 70 inches x 70 inches, which equals 4,900 inches squared.  Divide the total from step 2 (number of lb x 703) by the total from step 3 (inches squared): " "105,450 ÷ 4,900 = 21.5. This is your BMI.  To calculate your BMI in metric measurements:    Measure your weight in kilograms (kg).  For this example, the weight is 70 kg.  Measure your height in meters (m). Then multiply that number by itself to get a measurement called \"meters squared.\"  So, for an adult who is 1.75 m tall, the \"meters squared\" measurement is 1.75 m x 1.75 m, which equals 3.1 meters squared.  Divide the number of kilograms (your weight) by the meters squared number. In this example: 70 ÷ 3.1 = 22.6. This is your BMI.  What do the results mean?  BMI charts are used to see if you are underweight, normal weight, overweight, or obese. The following guidelines will be used:  Underweight: BMI less than 18.5.  Normal weight: BMI between 18.5 and 24.9.  Overweight: BMI between 25 and 29.9.  Obese: BMI of 30 or above.  BMI is a tool and cannot diagnose a condition. Talk with your health care provider about what your BMI means for you. Keep these notes in mind:  Weight includes fat and muscle. Someone with a muscular build, such as an athlete, may have a BMI that is higher than 24.9. In cases like these, BMI is not a correct measure of body fat.  If you have a BMI of 25 or higher, your provider may need to do more testing to find out if excess body fat is the cause.  BMI is measured the same way for males and females. Females usually have more body fat than males of the same height and weight.  Where to find more information  For more information about BMI, including tools to quickly find your BMI, go to:  Centers for Disease Control and Prevention: cdc.gov  American Heart Association: heart.org  National Heart, Lung, and Blood [place]: nhlbi.nih.gov  This information is not intended to replace advice given to you by your health care provider. Make sure you discuss any questions you have with your health care provider.  Document Revised: 09/07/2023 Document Reviewed: 08/31/2023  [employer] Patient Education " © 2023 [employer] Inc[employer]  Heart-Healthy Eating Plan  Many factors influence your heart (coronary) health, including eating and exercise habits. Coronary risk increases with abnormal blood fat (lipid) levels. Heart-healthy meal planning includes limiting unhealthy fats, increasing healthy fats, and making other diet and lifestyle changes.  What is my plan?  Your health care provider may recommend that you:  Limit your fat intake to _________% or less of your total calories each day.  Limit your saturated fat intake to _________% or less of your total calories each day.  Limit the amount of cholesterol in your diet to less than _________ mg per day.  What are tips for following this plan?  Cooking  Cook foods using methods other than frying. Baking, boiling, grilling, and broiling are all good options. Other ways to reduce fat include:  Removing the skin from poultry.  Removing all visible fats from meats.  Steaming vegetables in water or broth.  Meal planning  A plate with examples of foods in a healthy diet.      At meals, imagine dividing your plate into fourths:  Fill one-half of your plate with vegetables and green salads.  Fill one-fourth of your plate with whole grains.  Fill one-fourth of your plate with lean protein foods.  Eat 4-5 servings of vegetables per day. One serving equals 1 cup raw or cooked vegetable, or 2 cups raw leafy greens.  Eat 4-5 servings of fruit per day. One serving equals 1 medium whole fruit, ¼ cup dried fruit, ½ cup fresh, frozen, or canned fruit, or ½ cup 100% fruit juice.  Eat more foods that contain soluble fiber. Examples include apples, broccoli, carrots, beans, peas, and barley. Aim to get 25-30 g of fiber per day.  Increase your consumption of legumes, nuts, and seeds to 4-5 servings per week. One serving of dried beans or legumes equals ½ cup cooked, 1 serving of nuts is ¼ cup, and 1 serving of seeds equals 1 tablespoon.  Fats  Choose healthy fats more often. Choose monounsaturated  and polyunsaturated fats, such as olive and canola oils, flaxseeds, walnuts, almonds, and seeds.  Eat more omega-3 fats. Choose salmon, mackerel, sardines, tuna, flaxseed oil, and ground flaxseeds. Aim to eat fish at least 2 times each week.  Check food labels carefully to identify foods with trans fats or high amounts of saturated fat.  Limit saturated fats. These are found in animal products, such as meats, butter, and cream. Plant sources of saturated fats include palm oil, palm kernel oil, and coconut oil.  Avoid foods with partially hydrogenated oils in them. These contain trans fats. Examples are stick margarine, some tub margarines, cookies, crackers, and other baked goods.  Avoid fried foods.  General information  Eat more home-cooked food and less restaurant, buffet, and fast food.  Limit or avoid alcohol.  Limit foods that are high in starch and sugar.  Lose weight if you are overweight. Losing just 5-10% of your body weight can help your overall health and prevent diseases such as diabetes and heart disease.  Monitor your salt (sodium) intake, especially if you have high blood pressure. Talk with your health care provider about your sodium intake.  Try to incorporate more vegetarian meals weekly.  What foods can I eat?  Fruits  All fresh, canned (in natural juice), or frozen fruits.  Vegetables  Fresh or frozen vegetables (raw, steamed, roasted, or grilled). Green salads.  Grains  Most grains. Choose whole wheat and whole grains most of the time. Rice and pasta, including brown rice and pastas made with whole wheat.  Meats and other proteins  Lean, well-trimmed beef, veal, pork, and lamb. Chicken and turkey without skin. All fish and shellfish. Wild duck, rabbit, pheasant, and venison. Egg whites or low-cholesterol egg substitutes. Dried beans, peas, lentils, and tofu. Seeds and most nuts.  Dairy  Low-fat or nonfat cheeses, including ricotta and mozzarella. Skim or 1% milk (liquid, powdered, or  evaporated). Buttermilk made with low-fat milk. Nonfat or low-fat yogurt.  Fats and oils  Non-hydrogenated (trans-free) margarines. Vegetable oils, including soybean, sesame, sunflower, olive, peanut, safflower, corn, canola, and cottonseed. Salad dressings or mayonnaise made with a vegetable oil.  Beverages  Water (mineral or sparkling). Coffee and tea. Diet carbonated beverages.  Sweets and desserts  Sherbet, gelatin, and fruit ice. Small amounts of dark chocolate.  Limit all sweets and desserts.  Seasonings and condiments  All seasonings and condiments.  The items listed above may not be a complete list of foods and beverages you can eat. Contact a dietitian for more options.  What foods are not recommended?  Fruits  Canned fruit in heavy syrup. Fruit in cream or butter sauce. Fried fruit. Limit coconut.  Vegetables  Vegetables cooked in cheese, cream, or butter sauce. Fried vegetables.  Grains  Breads made with saturated or trans fats, oils, or whole milk. Croissants. Sweet rolls. Donuts. High-fat crackers, such as cheese crackers.  Meats and other proteins  Fatty meats, such as hot dogs, ribs, sausage, vargas, rib-eye roast or steak. High-fat deli meats, such as salami and bologna. Caviar. Domestic duck and goose. Organ meats, such as liver.  Dairy  Cream, sour cream, cream cheese, and creamed cottage cheese. Whole-milk cheeses. Whole or 2% milk (liquid, evaporated, or condensed). Whole buttermilk. Cream sauce or high-fat cheese sauce. Whole-milk yogurt.  Fats and oils  Meat fat, or shortening. Cocoa butter, hydrogenated oils, palm oil, coconut oil, palm kernel oil. Solid fats and shortenings, including vargas fat, salt pork, lard, and butter. Nondairy cream substitutes. Salad dressings with cheese or sour cream.  Beverages  Regular sodas and any drinks with added sugar.  Sweets and desserts  Frosting. Pudding. Cookies. Cakes. Pies. Milk chocolate or white chocolate. Buttered syrups. Full-fat ice cream or ice  cream drinks.  The items listed above may not be a complete list of foods and beverages to avoid. Contact a dietitian for more information.  Summary  Heart-healthy meal planning includes limiting unhealthy fats, increasing healthy fats, and making other diet and lifestyle changes.  Lose weight if you are overweight. Losing just 5-10% of your body weight can help your overall health and prevent diseases such as diabetes and heart disease.  Focus on eating a balance of foods, including fruits and vegetables, low-fat or nonfat dairy, lean protein, nuts and legumes, whole grains, and heart-healthy oils and fats.  This information is not intended to replace advice given to you by your health care provider. Make sure you discuss any questions you have with your health care provider.  Document Revised: 04/28/2022 Document Reviewed: 04/28/2022  Elsevier Patient Education © 2022 [employer] Inc.

## 2024-02-22 ENCOUNTER — LAB (OUTPATIENT)
Dept: LAB | Facility: HOSPITAL | Age: 79
End: 2024-02-22
Payer: MEDICARE

## 2024-02-22 ENCOUNTER — TRANSCRIBE ORDERS (OUTPATIENT)
Dept: ADMINISTRATIVE | Facility: HOSPITAL | Age: 79
End: 2024-02-22
Payer: MEDICARE

## 2024-02-22 DIAGNOSIS — E55.9 VITAMIN D DEFICIENCY: Chronic | ICD-10-CM

## 2024-02-22 DIAGNOSIS — Z51.81 THERAPEUTIC DRUG MONITORING: ICD-10-CM

## 2024-02-22 DIAGNOSIS — E78.2 MIXED HYPERLIPIDEMIA: Chronic | ICD-10-CM

## 2024-02-22 DIAGNOSIS — K40.90 UNILATERAL INGUINAL HERNIA WITHOUT OBSTRUCTION OR GANGRENE, RECURRENCE NOT SPECIFIED: Primary | ICD-10-CM

## 2024-02-22 DIAGNOSIS — Z79.01 ANTICOAGULATED ON WARFARIN: ICD-10-CM

## 2024-02-22 DIAGNOSIS — I10 BENIGN ESSENTIAL HYPERTENSION: ICD-10-CM

## 2024-02-22 LAB
25(OH)D3 SERPL-MCNC: 87.8 NG/ML (ref 30–100)
ALBUMIN SERPL-MCNC: 4.2 G/DL (ref 3.5–5.2)
ALBUMIN UR-MCNC: <1.2 MG/DL
ALBUMIN/GLOB SERPL: 1.9 G/DL
ALP SERPL-CCNC: 55 U/L (ref 39–117)
ALT SERPL W P-5'-P-CCNC: 21 U/L (ref 1–41)
ANION GAP SERPL CALCULATED.3IONS-SCNC: 11.5 MMOL/L (ref 5–15)
AST SERPL-CCNC: 22 U/L (ref 1–40)
BACTERIA UR QL AUTO: NORMAL /HPF
BASOPHILS # BLD AUTO: 0.04 10*3/MM3 (ref 0–0.2)
BASOPHILS NFR BLD AUTO: 0.7 % (ref 0–1.5)
BILIRUB SERPL-MCNC: 0.6 MG/DL (ref 0–1.2)
BILIRUB UR QL STRIP: NEGATIVE
BUN SERPL-MCNC: 26 MG/DL (ref 8–23)
BUN/CREAT SERPL: 22.8 (ref 7–25)
CALCIUM SPEC-SCNC: 9.5 MG/DL (ref 8.6–10.5)
CHLORIDE SERPL-SCNC: 105 MMOL/L (ref 98–107)
CHOLEST SERPL-MCNC: 179 MG/DL (ref 0–200)
CLARITY UR: CLEAR
CO2 SERPL-SCNC: 24.5 MMOL/L (ref 22–29)
COLOR UR: YELLOW
CREAT SERPL-MCNC: 1.14 MG/DL (ref 0.76–1.27)
CREAT UR-MCNC: 114.1 MG/DL
DEPRECATED RDW RBC AUTO: 45.5 FL (ref 37–54)
EGFRCR SERPLBLD CKD-EPI 2021: 65.8 ML/MIN/1.73
EOSINOPHIL # BLD AUTO: 0.54 10*3/MM3 (ref 0–0.4)
EOSINOPHIL NFR BLD AUTO: 8.8 % (ref 0.3–6.2)
ERYTHROCYTE [DISTWIDTH] IN BLOOD BY AUTOMATED COUNT: 13 % (ref 12.3–15.4)
GLOBULIN UR ELPH-MCNC: 2.2 GM/DL
GLUCOSE SERPL-MCNC: 90 MG/DL (ref 65–99)
GLUCOSE UR STRIP-MCNC: NEGATIVE MG/DL
HCT VFR BLD AUTO: 35.5 % (ref 37.5–51)
HCYS SERPL-MCNC: 14.5 UMOL/L (ref 0–15)
HDLC SERPL-MCNC: 73 MG/DL (ref 40–60)
HGB BLD-MCNC: 11.7 G/DL (ref 13–17.7)
HGB UR QL STRIP.AUTO: NEGATIVE
HYALINE CASTS UR QL AUTO: NORMAL /LPF
IMM GRANULOCYTES # BLD AUTO: 0.01 10*3/MM3 (ref 0–0.05)
IMM GRANULOCYTES NFR BLD AUTO: 0.2 % (ref 0–0.5)
INR PPP: 2.22 (ref 0.89–1.12)
KETONES UR QL STRIP: NEGATIVE
LDLC SERPL CALC-MCNC: 96 MG/DL (ref 0–100)
LDLC/HDLC SERPL: 1.31 {RATIO}
LEUKOCYTE ESTERASE UR QL STRIP.AUTO: NEGATIVE
LYMPHOCYTES # BLD AUTO: 2.75 10*3/MM3 (ref 0.7–3.1)
LYMPHOCYTES NFR BLD AUTO: 44.9 % (ref 19.6–45.3)
MCH RBC QN AUTO: 32.2 PG (ref 26.6–33)
MCHC RBC AUTO-ENTMCNC: 33 G/DL (ref 31.5–35.7)
MCV RBC AUTO: 97.8 FL (ref 79–97)
MICROALBUMIN/CREAT UR: NORMAL MG/G{CREAT}
MONOCYTES # BLD AUTO: 0.35 10*3/MM3 (ref 0.1–0.9)
MONOCYTES NFR BLD AUTO: 5.7 % (ref 5–12)
NEUTROPHILS NFR BLD AUTO: 2.43 10*3/MM3 (ref 1.7–7)
NEUTROPHILS NFR BLD AUTO: 39.7 % (ref 42.7–76)
NITRITE UR QL STRIP: NEGATIVE
NRBC BLD AUTO-RTO: 0 /100 WBC (ref 0–0.2)
PH UR STRIP.AUTO: 5.5 [PH] (ref 5–8)
PLATELET # BLD AUTO: 149 10*3/MM3 (ref 140–450)
PMV BLD AUTO: 11.3 FL (ref 6–12)
POTASSIUM SERPL-SCNC: 4.2 MMOL/L (ref 3.5–5.2)
PROT SERPL-MCNC: 6.4 G/DL (ref 6–8.5)
PROT UR QL STRIP: NEGATIVE
PROTHROMBIN TIME: 24.8 SECONDS (ref 12.2–14.5)
RBC # BLD AUTO: 3.63 10*6/MM3 (ref 4.14–5.8)
RBC # UR STRIP: NORMAL /HPF
REF LAB TEST METHOD: NORMAL
SODIUM SERPL-SCNC: 141 MMOL/L (ref 136–145)
SP GR UR STRIP: 1.02 (ref 1–1.03)
SQUAMOUS #/AREA URNS HPF: NORMAL /HPF
TRIGL SERPL-MCNC: 51 MG/DL (ref 0–150)
TSH SERPL DL<=0.05 MIU/L-ACNC: 2.52 UIU/ML (ref 0.27–4.2)
UROBILINOGEN UR QL STRIP: NORMAL
VLDLC SERPL-MCNC: 10 MG/DL (ref 5–40)
WBC # UR STRIP: NORMAL /HPF
WBC NRBC COR # BLD AUTO: 6.12 10*3/MM3 (ref 3.4–10.8)

## 2024-02-22 PROCEDURE — 85610 PROTHROMBIN TIME: CPT

## 2024-02-22 PROCEDURE — 81001 URINALYSIS AUTO W/SCOPE: CPT

## 2024-02-22 PROCEDURE — 82570 ASSAY OF URINE CREATININE: CPT

## 2024-02-22 PROCEDURE — 85025 COMPLETE CBC W/AUTO DIFF WBC: CPT

## 2024-02-22 PROCEDURE — 82306 VITAMIN D 25 HYDROXY: CPT

## 2024-02-22 PROCEDURE — 84443 ASSAY THYROID STIM HORMONE: CPT

## 2024-02-22 PROCEDURE — 80053 COMPREHEN METABOLIC PANEL: CPT

## 2024-02-22 PROCEDURE — 82043 UR ALBUMIN QUANTITATIVE: CPT

## 2024-02-22 PROCEDURE — 83090 ASSAY OF HOMOCYSTEINE: CPT

## 2024-02-22 PROCEDURE — 80061 LIPID PANEL: CPT

## 2024-02-25 DIAGNOSIS — D64.9 MILD ANEMIA: Primary | ICD-10-CM

## 2024-02-27 ENCOUNTER — PATIENT MESSAGE (OUTPATIENT)
Dept: INTERNAL MEDICINE | Facility: CLINIC | Age: 79
End: 2024-02-27
Payer: MEDICARE

## 2024-02-27 NOTE — TELEPHONE ENCOUNTER
From: Uriel Valverde  Sent: 2/27/2024 10:40 AM EST  To: [facility] Clinical Pool  Subject: Blood Pressure Readings    I can send you the charts in color if you give me an email address.    Uriel Valverde

## 2024-03-09 ENCOUNTER — HOSPITAL ENCOUNTER (OUTPATIENT)
Dept: CT IMAGING | Facility: HOSPITAL | Age: 79
Discharge: HOME | End: 2024-03-09
Payer: MEDICARE

## 2024-03-09 DIAGNOSIS — K40.90 UNILATERAL INGUINAL HERNIA WITHOUT OBSTRUCTION OR GANGRENE, RECURRENCE NOT SPECIFIED: ICD-10-CM

## 2024-03-09 PROCEDURE — 72192 CT PELVIS W/O DYE: CPT

## 2024-03-21 ENCOUNTER — LAB (OUTPATIENT)
Dept: LAB | Facility: HOSPITAL | Age: 79
End: 2024-03-21
Payer: MEDICARE

## 2024-03-21 DIAGNOSIS — D64.9 MILD ANEMIA: ICD-10-CM

## 2024-03-21 DIAGNOSIS — Z79.01 ANTICOAGULATED ON WARFARIN: ICD-10-CM

## 2024-03-21 DIAGNOSIS — Z51.81 THERAPEUTIC DRUG MONITORING: ICD-10-CM

## 2024-03-21 LAB
BASOPHILS # BLD AUTO: 0.04 10*3/MM3 (ref 0–0.2)
BASOPHILS NFR BLD AUTO: 0.7 % (ref 0–1.5)
DEPRECATED RDW RBC AUTO: 45.9 FL (ref 37–54)
EOSINOPHIL # BLD AUTO: 0.5 10*3/MM3 (ref 0–0.4)
EOSINOPHIL NFR BLD AUTO: 8.5 % (ref 0.3–6.2)
ERYTHROCYTE [DISTWIDTH] IN BLOOD BY AUTOMATED COUNT: 13 % (ref 12.3–15.4)
FERRITIN SERPL-MCNC: 313 NG/ML (ref 30–400)
HCT VFR BLD AUTO: 38.2 % (ref 37.5–51)
HGB BLD-MCNC: 12.5 G/DL (ref 13–17.7)
IMM GRANULOCYTES # BLD AUTO: 0.01 10*3/MM3 (ref 0–0.05)
IMM GRANULOCYTES NFR BLD AUTO: 0.2 % (ref 0–0.5)
INR PPP: 2.01 (ref 0.89–1.12)
IRON 24H UR-MRATE: 70 MCG/DL (ref 59–158)
IRON SATN MFR SERPL: 22 % (ref 20–50)
LYMPHOCYTES # BLD AUTO: 3.23 10*3/MM3 (ref 0.7–3.1)
LYMPHOCYTES NFR BLD AUTO: 54.7 % (ref 19.6–45.3)
MCH RBC QN AUTO: 32.1 PG (ref 26.6–33)
MCHC RBC AUTO-ENTMCNC: 32.7 G/DL (ref 31.5–35.7)
MCV RBC AUTO: 98.2 FL (ref 79–97)
MONOCYTES # BLD AUTO: 0.22 10*3/MM3 (ref 0.1–0.9)
MONOCYTES NFR BLD AUTO: 3.7 % (ref 5–12)
NEUTROPHILS NFR BLD AUTO: 1.91 10*3/MM3 (ref 1.7–7)
NEUTROPHILS NFR BLD AUTO: 32.2 % (ref 42.7–76)
NRBC BLD AUTO-RTO: 0 /100 WBC (ref 0–0.2)
PLATELET # BLD AUTO: 161 10*3/MM3 (ref 140–450)
PMV BLD AUTO: 11.4 FL (ref 6–12)
PROTHROMBIN TIME: 22.9 SECONDS (ref 12.2–14.5)
RBC # BLD AUTO: 3.89 10*6/MM3 (ref 4.14–5.8)
TIBC SERPL-MCNC: 317 MCG/DL (ref 298–536)
TRANSFERRIN SERPL-MCNC: 213 MG/DL (ref 200–360)
VIT B12 BLD-MCNC: 767 PG/ML (ref 211–946)
WBC NRBC COR # BLD AUTO: 5.91 10*3/MM3 (ref 3.4–10.8)

## 2024-03-21 PROCEDURE — 82607 VITAMIN B-12: CPT

## 2024-03-21 PROCEDURE — 82728 ASSAY OF FERRITIN: CPT

## 2024-03-21 PROCEDURE — 85610 PROTHROMBIN TIME: CPT

## 2024-03-21 PROCEDURE — 85025 COMPLETE CBC W/AUTO DIFF WBC: CPT

## 2024-03-21 PROCEDURE — 83540 ASSAY OF IRON: CPT

## 2024-03-21 PROCEDURE — 84466 ASSAY OF TRANSFERRIN: CPT

## 2024-04-15 NOTE — TELEPHONE ENCOUNTER
Rx Refill Note  Requested Prescriptions     Pending Prescriptions Disp Refills    warfarin (COUMADIN) 5 MG tablet [Pharmacy Med Name: WARFARIN SODIUM 5 MG TABLET] 30 tablet 1     Sig: TAKE 1 TABLET BY MOUTH DAILY TOTAL DOSE OF 9MG      Last office visit with prescribing clinician: 2/9/2024   Last telemedicine visit with prescribing clinician: Visit date not found   Next office visit with prescribing clinician: 8/12/2024                         Would you like a call back once the refill request has been completed: [] Yes [] No    If the office needs to give you a call back, can they leave a voicemail: [] Yes [] No    Huong Leonard MA  04/15/24, 09:02 EDT

## 2024-04-16 RX ORDER — WARFARIN SODIUM 5 MG/1
TABLET ORAL
Qty: 30 TABLET | Refills: 1 | Status: SHIPPED | OUTPATIENT
Start: 2024-04-16

## 2024-04-18 ENCOUNTER — LAB (OUTPATIENT)
Dept: LAB | Facility: HOSPITAL | Age: 79
End: 2024-04-18
Payer: MEDICARE

## 2024-04-18 DIAGNOSIS — Z51.81 THERAPEUTIC DRUG MONITORING: ICD-10-CM

## 2024-04-18 DIAGNOSIS — Z79.01 ANTICOAGULATED ON WARFARIN: ICD-10-CM

## 2024-04-18 LAB
INR PPP: 2.05 (ref 0.89–1.12)
PROTHROMBIN TIME: 23.3 SECONDS (ref 12.2–14.5)

## 2024-04-18 PROCEDURE — 85610 PROTHROMBIN TIME: CPT

## 2024-05-13 ENCOUNTER — PRE-ADMISSION TESTING (OUTPATIENT)
Dept: PREADMISSION TESTING | Facility: HOSPITAL | Age: 79
End: 2024-05-13
Payer: MEDICARE

## 2024-05-13 VITALS — HEIGHT: 72 IN | BODY MASS INDEX: 29.35 KG/M2 | WEIGHT: 216.71 LBS

## 2024-05-13 LAB
ALBUMIN SERPL-MCNC: 4 G/DL (ref 3.5–5.2)
ALBUMIN/GLOB SERPL: 1.5 G/DL
ALP SERPL-CCNC: 78 U/L (ref 39–117)
ALT SERPL W P-5'-P-CCNC: 25 U/L (ref 1–41)
ANION GAP SERPL CALCULATED.3IONS-SCNC: 8 MMOL/L (ref 5–15)
AST SERPL-CCNC: 25 U/L (ref 1–40)
BILIRUB SERPL-MCNC: 0.3 MG/DL (ref 0–1.2)
BUN SERPL-MCNC: 34 MG/DL (ref 8–23)
BUN/CREAT SERPL: 32.1 (ref 7–25)
CALCIUM SPEC-SCNC: 9.3 MG/DL (ref 8.6–10.5)
CHLORIDE SERPL-SCNC: 108 MMOL/L (ref 98–107)
CO2 SERPL-SCNC: 26 MMOL/L (ref 22–29)
CREAT SERPL-MCNC: 1.06 MG/DL (ref 0.76–1.27)
DEPRECATED RDW RBC AUTO: 52.7 FL (ref 37–54)
EGFRCR SERPLBLD CKD-EPI 2021: 71.8 ML/MIN/1.73
ERYTHROCYTE [DISTWIDTH] IN BLOOD BY AUTOMATED COUNT: 14.4 % (ref 12.3–15.4)
GLOBULIN UR ELPH-MCNC: 2.6 GM/DL
GLUCOSE SERPL-MCNC: 87 MG/DL (ref 65–99)
HCT VFR BLD AUTO: 36 % (ref 37.5–51)
HGB BLD-MCNC: 12 G/DL (ref 13–17.7)
INR PPP: 1.94 (ref 0.89–1.12)
MCH RBC QN AUTO: 33.8 PG (ref 26.6–33)
MCHC RBC AUTO-ENTMCNC: 33.3 G/DL (ref 31.5–35.7)
MCV RBC AUTO: 101.4 FL (ref 79–97)
PLATELET # BLD AUTO: 144 10*3/MM3 (ref 140–450)
PMV BLD AUTO: 10.9 FL (ref 6–12)
POTASSIUM SERPL-SCNC: 4.7 MMOL/L (ref 3.5–5.2)
PROT SERPL-MCNC: 6.6 G/DL (ref 6–8.5)
PROTHROMBIN TIME: 22.3 SECONDS (ref 12.2–14.5)
RBC # BLD AUTO: 3.55 10*6/MM3 (ref 4.14–5.8)
SODIUM SERPL-SCNC: 142 MMOL/L (ref 136–145)
WBC NRBC COR # BLD AUTO: 5.84 10*3/MM3 (ref 3.4–10.8)

## 2024-05-13 PROCEDURE — 85610 PROTHROMBIN TIME: CPT

## 2024-05-13 PROCEDURE — 36415 COLL VENOUS BLD VENIPUNCTURE: CPT

## 2024-05-13 PROCEDURE — 85027 COMPLETE CBC AUTOMATED: CPT

## 2024-05-13 PROCEDURE — 93005 ELECTROCARDIOGRAM TRACING: CPT

## 2024-05-13 PROCEDURE — 80053 COMPREHEN METABOLIC PANEL: CPT

## 2024-05-13 NOTE — PAT
An arrival time for procedure was not provided during PAT visit. If patient had any questions or concerns about their arrival time, they were instructed to contact their surgeon/physician.  Additionally, if the patient referred to an arrival time that was acquired from their my chart account, patient was encouraged to verify that time with their surgeon/physician. Arrival times are NOT provided in Pre Admission Testing Department.    Patient viewed general PAT education video as instructed in their preoperative information received from their surgeon.  Patient stated the general PAT education video was viewed in its entirety and survey completed.  Copies of PAT general education handouts (Incentive Spirometry, Meds to Beds Program, Patient Belongings, Pre-op skin preparation instructions, Blood Glucose testing, Visitor policy, Surgery FAQ, Code H) distributed to patient if not printed. Education related to the PAT pass and skin preparation for surgery (if applicable) completed in PAT as a reinforcement to PAT education video. Patient instructed to return PAT pass provided today as well as completed skin preparation sheet (if applicable) on the day of procedure.     Additionally if patient had not viewed video yet but intended to view it at home or in our waiting area, then referred them to the handout with QR code/link provided during PAT visit.  Instructed patient to complete survey after viewing the video in its entirety.  Encouraged patient/family to read PAT general education handouts thoroughly and notify PAT staff with any questions or concerns. Patient verbalized understanding of all information and priority content.    Patient denies any current skin issues.     Patient to apply Chlorhexadine wipes  to surgical area (as instructed) the night before procedure and the AM of procedure. Wipes provided.    Per Anesthesia Request, patient instructed not to take their ACE/ARB medications on the AM of  surgery.    Bactroban to be applied to each nostril during PAT visit if surgery the following day.  If surgery NOT the following day, then Bactroban supplied to patient with instructions both written and verbally to insert Bactroban into each nares the night before surgery.    Pt instructed to stop Coumadin 2 days prior to surgery per MD

## 2024-05-14 NOTE — PROGRESS NOTES
[facility]     Uriel Valverde  1945   [id number]      Patient Care Team:  Barbi Carlson MD as PCP - General (Internal Medicine)    Chief Complaint   Patient presents with   • Hyperlipidemia            HPI  Patient is a 77 y.o. male who presents today for a follow-up visit.    Atrial fibrillation  The patient is taking warfarin regularly. He is getting his PT/INR checked regularly. He denies any hematochezia, blood when he brushes his teeth, or dyspepsia. He denies any tachycardia, chest pain, shortness of breath, or swelling in his ankles. The patient denies side effects to his medications.    Gout  The patient is taking allopurinol to prevent gout. He has not had any gout episodes in years.    Hypertension  The patient is taking doxazosin at night. He denies any lightheadedness or dizziness. He is taking olmesartan in the morning. He rarely checks his blood pressure at home. Today, his blood pressure is 118/62 mmHg. His systolic blood pressure is usually in the 120s mmHg.    Weight gain  The patient has gained 7 pounds in the last 6 months. He has not been exercising for the last month, but he feels he could do more. The patient will play golf 4 times per week when the weather improves, and he walks when the ground is dry.  He denies joint pain when he ambulates. The patient does not go to the gym. He has barbells at home, but he has not been able to use them due to his cataracts. The patient does not have a treadmill at home. He eats a low-fat, low-sugar diet. He takes B12 and vitamin D.    Varicose veins  The patient denies any burning or pain in his legs.    Immunizations  The patient is up to date on his immunizations.        CHRONIC CONDITIONS      Past Medical History:   Diagnosis Date   • A-fib (HCC) 02/11/2004    chronic coumadin; failed cardioversion; normal echo and exercise nuclear stress test.  He opted not to have ablation.   • A-fib (HCC)     failed cardioversion, normal echo  and exercise nuclear stress test.  He opted not to have ablation   • Abnormal CT scan, lung 2/9/2017    RLL opacity with air bronchograms    • Allergic Soy    Wheat   • Allergic rhinitis    • Allergy to wheat     as well as soybean   • Anemia    • BPH (benign prostatic hyperplasia)    • Cobalamin deficiency 11/15/2016   • Cough 2015    sec/to inflammatory pnuemonitis   • Elbow effusion, left 09/2015    presumed sec/to gout   • Gout 2015    right ankle and righ knee (also of right hand-remote); colchicine prn.Uric acid lowering with allopurinol   • H/O eye surgery 2003   • Hayfever     as child; resolved when family moved to [place] from [place]   • Hypertension    • 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 Rod Whitten   • Macrocytic anemia 5/9/2016   • Medial meniscus tear 2003    right; arthroscopy knee   • Sensitivity to sunlight     sun sensitivity rash; sun avoidance, for many years   • Shoulder fracture, right     childhood   • Spongiotic dermatitis    • Tonsillitis     childhood; Tonsillectomy   • Vitamin B12 deficiency        Past Surgical History:   Procedure Laterality Date   • COLONOSCOPY     • HERNIA REPAIR     • KNEE ARTHROSCOPY Right 2003    due to meniscus tear   • TONSILLECTOMY      as a child   • VASECTOMY         Family History   Problem Relation Age of Onset   • Heart failure Father    • Coronary artery disease Father         COD   • Stroke Sister 63   • Cancer Sister         Lung Cancer       Social History     Socioeconomic History   • Marital status:    Tobacco Use   • Smoking status: Former     Packs/day: 0.50     Years: 22.00     Pack years: 11.00     Types: Cigarettes     Start date: 1/1/1966  "    Quit date: 1978     Years since quittin.1   • Smokeless tobacco: Never   • Tobacco comments:     no passive smoke exposure, quit 79   Substance and Sexual Activity   • Alcohol use: Yes     Alcohol/week: 2.0 standard drinks     Types: 2 Glasses of wine per week     Comment: daily   • Drug use: No   • Sexual activity: Never       Allergies   Allergen Reactions   • Other Rash     Soy, mold, and wheat         Vital Signs  Vitals:    23 0804   BP: 118/62   BP Location: Left arm   Patient Position: Sitting   Cuff Size: Adult   Pulse: 65   Temp: 97.7 °F (36.5 °C)   TempSrc: Infrared   SpO2: 95%   Weight: 102 kg (224 lb 3.2 oz)   Height: 185.4 cm (73\")   PainSc: 0-No pain     Body mass index is 29.58 kg/m².  BMI is >= 25 and <30. (Overweight) The following options were offered after discussion;: weight loss educational material (shared in after visit summary), exercise counseling/recommendations and nutrition counseling/recommendations        Current Outpatient Medications:   •  allopurinol (ZYLOPRIM) 100 MG tablet, TAKE ONE TABLET BY MOUTH DAILY, Disp: 90 tablet, Rfl: 3  •  cholecalciferol (Vitamin D, Cholecalciferol,) 25 MCG (1000 UT) tablet, Take 1 tablet by mouth Daily., Disp: 60 tablet, Rfl: 5  •  doxazosin (CARDURA) 4 MG tablet, Take 1 tablet by mouth Every Night., Disp: 90 tablet, Rfl: 3  •  fexofenadine (ALLEGRA) 180 MG tablet, Take 180 mg by mouth Daily., Disp: , Rfl:   •  folic acid (FOLVITE) 1 MG tablet, Take 1 tablet by mouth Daily., Disp: , Rfl:   •  olmesartan (Benicar) 20 MG tablet, Take 1 tablet by mouth Daily., Disp: 90 tablet, Rfl: 3  •  vitamin B-12 (CYANOCOBALAMIN) 500 MCG tablet, Take 1 tablet by mouth Daily., Disp: 90 tablet, Rfl: 1  •  warfarin (COUMADIN) 10 MG tablet, Take 10 mg on  and Sundays.  Take 9 mg all other days., Disp: 30 tablet, Rfl: 5  •  warfarin (COUMADIN) 4 MG tablet, TAKE 10MG DAILY FOR 2 DAYS A WEEK AND 9MG DAILY FOR 5 DAYS A WEEK, Disp: 90 tablet, " Rfl: 1  •  warfarin (COUMADIN) 5 MG tablet, TAKE 2 TABLETS BY MOUTH FOR 2 DAYS A WEEK AND 9MG FOR 5 DAYS A WEEK, Disp: 90 tablet, Rfl: 0    Physical Exam:    Physical Exam  Vitals and nursing note reviewed.   Constitutional:       Appearance: He is well-developed.      Comments: Overweight.   HENT:      Head: Normocephalic.   Eyes:      Conjunctiva/sclera: Conjunctivae normal.      Pupils: Pupils are equal, round, and reactive to light.   Neck:      Thyroid: No thyromegaly.   Cardiovascular:      Rate and Rhythm: Normal rate and regular rhythm.      Heart sounds: Normal heart sounds.   Pulmonary:      Effort: Pulmonary effort is normal.      Breath sounds: Normal breath sounds.   Musculoskeletal:         General: Normal range of motion.      Cervical back: Normal range of motion and neck supple.   Lymphadenopathy:      Cervical: No cervical adenopathy.   Neurological:      Mental Status: He is alert and oriented to person, place, and time.   Psychiatric:         Thought Content: Thought content normal.          ACE III MINI        Results Review:    None    CMP:  Lab Results   Component Value Date    BUN 26 (H) 07/19/2022    CREATININE 1.10 07/19/2022    EGFRIFNONA 71 01/03/2022    BCR 23.6 07/19/2022     07/19/2022    K 4.8 07/19/2022    CO2 26.0 07/19/2022    CALCIUM 9.5 07/19/2022    ALBUMIN 4.20 07/19/2022    BILITOT 0.6 07/19/2022    ALKPHOS 59 07/19/2022    AST 24 07/19/2022    ALT 19 07/19/2022     HbA1c:  Lab Results   Component Value Date    HGBA1C 5.80 (H) 07/19/2022     Microalbumin:  Lab Results   Component Value Date    MICROALBUR <1.2 07/19/2022     Lipid Panel  Lab Results   Component Value Date    CHOL 207 (H) 07/19/2022    TRIG 57 07/19/2022    HDL 74 (H) 07/19/2022     (H) 07/19/2022    AST 24 07/19/2022    ALT 19 07/19/2022       Medication Review: Medications reviewed and noted  Patient Instructions   Problem List Items Addressed This Visit        Cardiac and Vasculature    Mixed  hyperlipidemia (Chronic)    Overview     Lifestyle control.         Current Assessment & Plan     He will continue to avoid sugars, white carbohydrates, and fats in the diet. Continue regular exercise on the golf course. Also continue using barbells at home. I also recommend doing some walking on a treadmill or use an exercise bike or walk at the mall on bad weather days.         Relevant Orders    Vitamin B12    Lipid Panel    Benign essential hypertension - Primary    Overview     Taking olmesartan daily and doxazosin every evening.           Current Assessment & Plan     He will continue olmesartan and doxazosin. Continue to avoid salt in the diet.         Relevant Medications    olmesartan (Benicar) 20 MG tablet    doxazosin (CARDURA) 4 MG tablet    Other Relevant Orders    CBC & Differential    Comprehensive Metabolic Panel    Microalbumin / Creatinine Urine Ratio - Urine, Clean Catch    Urinalysis With Microscopic - Urine, Clean Catch    Chronic atrial fibrillation    Overview     Anticoagulation with Coumadin and regular PT/INR checks.           Current Assessment & Plan     He will continue anticoagulation with Coumadin. He will continue regular PT/INR checks.         Varicose veins of lower extremity    Overview     Varicose veins with edema             Current Assessment & Plan     He will continue wearing support hose or support socks daily.            Endocrine and Metabolic    Vitamin D deficiency (Chronic)    Relevant Orders    Vitamin D,25-Hydroxy       Musculoskeletal and Injuries    Personal history of gout (Chronic)    Current Assessment & Plan     We will recheck uric acid level today. He will continue taking allopurinol daily.          Relevant Orders    Uric Acid          Diagnosis Plan   1. Benign essential hypertension  CBC & Differential    Comprehensive Metabolic Panel    Microalbumin / Creatinine Urine Ratio - Urine, Clean Catch    Urinalysis With Microscopic - Urine, Clean Catch      2.  Mixed hyperlipidemia  Vitamin B12    Lipid Panel      3. 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 Vitamin D deficiency  Vitamin D,25-Hydroxy      5. Personal history of gout  Uric Acid      6. Chronic atrial fibrillation          Follow-up: He will come back to see me in 6 months for annual wellness visit.        Plan of care reviewed with patient at the conclusion of today's visit. Education was provided regarding diagnosis, management, and any prescribed or recommended OTC medications.Patient verbalizes understanding of and agreement with management plan.         Barbi Carlson MD      Transcribed from ambient dictation for Barbi Carlson MD by Rowan Velasquez.  01/25/23   09:14 EST    Patient or patient representative verbalized consent to the visit recording.  I have personally performed the services described in this document as transcribed by the above individual, and it is both accurate and complete.       78

## 2024-05-15 LAB
QT INTERVAL: 452 MS
QTC INTERVAL: 395 MS

## 2024-05-20 ENCOUNTER — ANESTHESIA EVENT (OUTPATIENT)
Dept: PERIOP | Facility: HOSPITAL | Age: 79
End: 2024-05-20
Payer: MEDICARE

## 2024-05-20 ENCOUNTER — ANESTHESIA EVENT CONVERTED (OUTPATIENT)
Dept: ANESTHESIOLOGY | Facility: HOSPITAL | Age: 79
End: 2024-05-20
Payer: MEDICARE

## 2024-05-20 ENCOUNTER — ANESTHESIA (OUTPATIENT)
Dept: PERIOP | Facility: HOSPITAL | Age: 79
End: 2024-05-20
Payer: MEDICARE

## 2024-05-20 ENCOUNTER — HOSPITAL ENCOUNTER (OUTPATIENT)
Facility: HOSPITAL | Age: 79
Discharge: HOME | End: 2024-05-21
Attending: SURGERY | Admitting: SURGERY
Payer: MEDICARE

## 2024-05-20 DIAGNOSIS — K42.9 UMBILICAL HERNIA WITHOUT OBSTRUCTION OR GANGRENE: ICD-10-CM

## 2024-05-20 DIAGNOSIS — K40.91 RECURRENT RIGHT INGUINAL HERNIA: Primary | ICD-10-CM

## 2024-05-20 PROBLEM — K40.90 RIGHT INGUINAL HERNIA: Status: ACTIVE | Noted: 2024-05-20

## 2024-05-20 LAB
INR PPP: 1.72 (ref 0.89–1.12)
PROTHROMBIN TIME: 20.4 SECONDS (ref 12.2–14.5)

## 2024-05-20 PROCEDURE — 25010000002 DEXAMETHASONE SODIUM PHOSPHATE 10 MG/ML SOLUTION: Performed by: NURSE ANESTHETIST, CERTIFIED REGISTERED

## 2024-05-20 PROCEDURE — 63710000001 FAMOTIDINE 20 MG TABLET: Performed by: SURGERY

## 2024-05-20 PROCEDURE — 25810000003 LACTATED RINGERS PER 1000 ML: Performed by: ANESTHESIOLOGY

## 2024-05-20 PROCEDURE — C1781 MESH (IMPLANTABLE): HCPCS | Performed by: SURGERY

## 2024-05-20 PROCEDURE — 25010000002 BUPIVACAINE (PF) 0.25 % SOLUTION: Performed by: NURSE ANESTHETIST, CERTIFIED REGISTERED

## 2024-05-20 PROCEDURE — 25010000002 PROPOFOL 10 MG/ML EMULSION: Performed by: ANESTHESIOLOGY

## 2024-05-20 PROCEDURE — 63710000001 TERAZOSIN 5 MG CAPSULE: Performed by: SURGERY

## 2024-05-20 PROCEDURE — A9270 NON-COVERED ITEM OR SERVICE: HCPCS | Performed by: SURGERY

## 2024-05-20 PROCEDURE — 25810000003 LACTATED RINGERS PER 1000 ML: Performed by: SURGERY

## 2024-05-20 PROCEDURE — 63710000001 MELOXICAM 7.5 MG TABLET: Performed by: SURGERY

## 2024-05-20 PROCEDURE — 25010000002 ONDANSETRON PER 1 MG: Performed by: ANESTHESIOLOGY

## 2024-05-20 PROCEDURE — 63710000001 MAGNESIUM HYDROXIDE 400 MG/5ML SUSPENSION: Performed by: SURGERY

## 2024-05-20 PROCEDURE — G0378 HOSPITAL OBSERVATION PER HR: HCPCS

## 2024-05-20 PROCEDURE — 25010000002 SUGAMMADEX 200 MG/2ML SOLUTION: Performed by: ANESTHESIOLOGY

## 2024-05-20 PROCEDURE — 85610 PROTHROMBIN TIME: CPT | Performed by: ANESTHESIOLOGY

## 2024-05-20 PROCEDURE — 25010000002 CEFAZOLIN PER 500 MG: Performed by: SURGERY

## 2024-05-20 PROCEDURE — 88302 TISSUE EXAM BY PATHOLOGIST: CPT | Performed by: SURGERY

## 2024-05-20 PROCEDURE — 25010000002 GLYCOPYRROLATE 1 MG/5ML SOLUTION: Performed by: ANESTHESIOLOGY

## 2024-05-20 PROCEDURE — 63710000001 LOSARTAN 50 MG TABLET: Performed by: SURGERY

## 2024-05-20 PROCEDURE — 25010000002 DEXAMETHASONE PER 1 MG: Performed by: ANESTHESIOLOGY

## 2024-05-20 PROCEDURE — 25010000002 FENTANYL CITRATE (PF) 50 MCG/ML SOLUTION

## 2024-05-20 PROCEDURE — 25010000002 PHYTONADIONE 10 MG/ML SOLUTION: Performed by: SURGERY

## 2024-05-20 PROCEDURE — 25010000002 FENTANYL CITRATE (PF) 100 MCG/2ML SOLUTION: Performed by: ANESTHESIOLOGY

## 2024-05-20 DEVICE — VENTRALEX ST HERNIA PATCH
Type: IMPLANTABLE DEVICE | Site: ABDOMEN | Status: FUNCTIONAL
Brand: VENTRALEX ST HERNIA PATCH

## 2024-05-20 DEVICE — FLOSEAL WITH RECOTHROM - 5ML
Type: IMPLANTABLE DEVICE | Site: ABDOMEN | Status: FUNCTIONAL
Brand: FLOSEAL HEMOSTATIC MATRIX

## 2024-05-20 DEVICE — MESH FLUT SHT 3X6IN: Type: IMPLANTABLE DEVICE | Site: ABDOMEN | Status: FUNCTIONAL

## 2024-05-20 RX ORDER — LIDOCAINE HYDROCHLORIDE 10 MG/ML
0.5 INJECTION, SOLUTION EPIDURAL; INFILTRATION; INTRACAUDAL; PERINEURAL ONCE AS NEEDED
Status: COMPLETED | OUTPATIENT
Start: 2024-05-20 | End: 2024-05-20

## 2024-05-20 RX ORDER — HYDROMORPHONE HYDROCHLORIDE 1 MG/ML
0.5 INJECTION, SOLUTION INTRAMUSCULAR; INTRAVENOUS; SUBCUTANEOUS
Status: DISCONTINUED | OUTPATIENT
Start: 2024-05-20 | End: 2024-05-20 | Stop reason: HOSPADM

## 2024-05-20 RX ORDER — SODIUM CHLORIDE 9 MG/ML
40 INJECTION, SOLUTION INTRAVENOUS AS NEEDED
Status: DISCONTINUED | OUTPATIENT
Start: 2024-05-20 | End: 2024-05-20 | Stop reason: HOSPADM

## 2024-05-20 RX ORDER — FAMOTIDINE 10 MG/ML
20 INJECTION, SOLUTION INTRAVENOUS ONCE
Status: CANCELLED | OUTPATIENT
Start: 2024-05-20 | End: 2024-05-20

## 2024-05-20 RX ORDER — MELOXICAM 7.5 MG/1
7.5 TABLET ORAL DAILY
Status: DISCONTINUED | OUTPATIENT
Start: 2024-05-20 | End: 2024-05-21 | Stop reason: HOSPADM

## 2024-05-20 RX ORDER — LOSARTAN POTASSIUM 50 MG/1
100 TABLET ORAL
Status: DISCONTINUED | OUTPATIENT
Start: 2024-05-20 | End: 2024-05-21 | Stop reason: HOSPADM

## 2024-05-20 RX ORDER — LIDOCAINE HYDROCHLORIDE 10 MG/ML
INJECTION, SOLUTION EPIDURAL; INFILTRATION; INTRACAUDAL; PERINEURAL AS NEEDED
Status: DISCONTINUED | OUTPATIENT
Start: 2024-05-20 | End: 2024-05-20 | Stop reason: SURG

## 2024-05-20 RX ORDER — ACETAMINOPHEN 325 MG/1
650 TABLET ORAL EVERY 4 HOURS PRN
Status: DISCONTINUED | OUTPATIENT
Start: 2024-05-20 | End: 2024-05-21 | Stop reason: HOSPADM

## 2024-05-20 RX ORDER — SODIUM CHLORIDE, SODIUM LACTATE, POTASSIUM CHLORIDE, CALCIUM CHLORIDE 600; 310; 30; 20 MG/100ML; MG/100ML; MG/100ML; MG/100ML
100 INJECTION, SOLUTION INTRAVENOUS CONTINUOUS
Status: DISCONTINUED | OUTPATIENT
Start: 2024-05-20 | End: 2024-05-20

## 2024-05-20 RX ORDER — MAGNESIUM HYDROXIDE 1200 MG/15ML
LIQUID ORAL AS NEEDED
Status: DISCONTINUED | OUTPATIENT
Start: 2024-05-20 | End: 2024-05-20 | Stop reason: HOSPADM

## 2024-05-20 RX ORDER — DEXAMETHASONE SODIUM PHOSPHATE 10 MG/ML
INJECTION, SOLUTION INTRAMUSCULAR; INTRAVENOUS
Status: COMPLETED | OUTPATIENT
Start: 2024-05-20 | End: 2024-05-20

## 2024-05-20 RX ORDER — SODIUM CHLORIDE, SODIUM LACTATE, POTASSIUM CHLORIDE, CALCIUM CHLORIDE 600; 310; 30; 20 MG/100ML; MG/100ML; MG/100ML; MG/100ML
75 INJECTION, SOLUTION INTRAVENOUS CONTINUOUS
Status: DISCONTINUED | OUTPATIENT
Start: 2024-05-20 | End: 2024-05-21 | Stop reason: HOSPADM

## 2024-05-20 RX ORDER — FENTANYL CITRATE 50 UG/ML
50 INJECTION, SOLUTION INTRAMUSCULAR; INTRAVENOUS
Status: DISCONTINUED | OUTPATIENT
Start: 2024-05-20 | End: 2024-05-20 | Stop reason: HOSPADM

## 2024-05-20 RX ORDER — NITROGLYCERIN 0.4 MG/1
0.4 TABLET SUBLINGUAL
Status: DISCONTINUED | OUTPATIENT
Start: 2024-05-20 | End: 2024-05-21 | Stop reason: HOSPADM

## 2024-05-20 RX ORDER — ONDANSETRON 2 MG/ML
INJECTION INTRAMUSCULAR; INTRAVENOUS AS NEEDED
Status: DISCONTINUED | OUTPATIENT
Start: 2024-05-20 | End: 2024-05-20 | Stop reason: SURG

## 2024-05-20 RX ORDER — CEFAZOLIN SODIUM 2 G/100ML
2000 INJECTION, SOLUTION INTRAVENOUS EVERY 8 HOURS
Qty: 200 ML | Refills: 0 | Status: DISCONTINUED | OUTPATIENT
Start: 2024-05-20 | End: 2024-05-20

## 2024-05-20 RX ORDER — HYDROCODONE BITARTRATE AND ACETAMINOPHEN 5; 325 MG/1; MG/1
1 TABLET ORAL EVERY 4 HOURS PRN
Status: DISCONTINUED | OUTPATIENT
Start: 2024-05-20 | End: 2024-05-21 | Stop reason: HOSPADM

## 2024-05-20 RX ORDER — ROCURONIUM BROMIDE 10 MG/ML
INJECTION, SOLUTION INTRAVENOUS AS NEEDED
Status: DISCONTINUED | OUTPATIENT
Start: 2024-05-20 | End: 2024-05-20 | Stop reason: SURG

## 2024-05-20 RX ORDER — MIDAZOLAM HYDROCHLORIDE 1 MG/ML
0.5 INJECTION INTRAMUSCULAR; INTRAVENOUS
Status: DISCONTINUED | OUTPATIENT
Start: 2024-05-20 | End: 2024-05-20 | Stop reason: HOSPADM

## 2024-05-20 RX ORDER — ONDANSETRON 2 MG/ML
4 INJECTION INTRAMUSCULAR; INTRAVENOUS ONCE AS NEEDED
Status: DISCONTINUED | OUTPATIENT
Start: 2024-05-20 | End: 2024-05-20 | Stop reason: HOSPADM

## 2024-05-20 RX ORDER — TERAZOSIN 5 MG/1
5 CAPSULE ORAL NIGHTLY
Status: DISCONTINUED | OUTPATIENT
Start: 2024-05-20 | End: 2024-05-21 | Stop reason: HOSPADM

## 2024-05-20 RX ORDER — HEPARIN SODIUM 5000 [USP'U]/ML
5000 INJECTION, SOLUTION INTRAVENOUS; SUBCUTANEOUS EVERY 8 HOURS SCHEDULED
Status: DISCONTINUED | OUTPATIENT
Start: 2024-05-21 | End: 2024-05-21 | Stop reason: HOSPADM

## 2024-05-20 RX ORDER — FAMOTIDINE 20 MG/1
20 TABLET, FILM COATED ORAL 2 TIMES DAILY
Status: DISCONTINUED | OUTPATIENT
Start: 2024-05-20 | End: 2024-05-21 | Stop reason: HOSPADM

## 2024-05-20 RX ORDER — PHYTONADIONE 10 MG/ML
5 INJECTION, EMULSION INTRAMUSCULAR; INTRAVENOUS; SUBCUTANEOUS ONCE
Status: COMPLETED | OUTPATIENT
Start: 2024-05-20 | End: 2024-05-20

## 2024-05-20 RX ORDER — DEXAMETHASONE SODIUM PHOSPHATE 4 MG/ML
INJECTION, SOLUTION INTRA-ARTICULAR; INTRALESIONAL; INTRAMUSCULAR; INTRAVENOUS; SOFT TISSUE AS NEEDED
Status: DISCONTINUED | OUTPATIENT
Start: 2024-05-20 | End: 2024-05-20 | Stop reason: SURG

## 2024-05-20 RX ORDER — ONDANSETRON 4 MG/1
4 TABLET, ORALLY DISINTEGRATING ORAL EVERY 6 HOURS PRN
Status: DISCONTINUED | OUTPATIENT
Start: 2024-05-20 | End: 2024-05-21 | Stop reason: HOSPADM

## 2024-05-20 RX ORDER — ONDANSETRON 2 MG/ML
4 INJECTION INTRAMUSCULAR; INTRAVENOUS EVERY 6 HOURS PRN
Status: DISCONTINUED | OUTPATIENT
Start: 2024-05-20 | End: 2024-05-21 | Stop reason: HOSPADM

## 2024-05-20 RX ORDER — MORPHINE SULFATE 2 MG/ML
2 INJECTION, SOLUTION INTRAMUSCULAR; INTRAVENOUS EVERY 4 HOURS PRN
Status: DISCONTINUED | OUTPATIENT
Start: 2024-05-20 | End: 2024-05-21 | Stop reason: HOSPADM

## 2024-05-20 RX ORDER — SODIUM CHLORIDE 0.9 % (FLUSH) 0.9 %
10 SYRINGE (ML) INJECTION AS NEEDED
Status: DISCONTINUED | OUTPATIENT
Start: 2024-05-20 | End: 2024-05-20 | Stop reason: HOSPADM

## 2024-05-20 RX ORDER — SODIUM CHLORIDE, SODIUM LACTATE, POTASSIUM CHLORIDE, CALCIUM CHLORIDE 600; 310; 30; 20 MG/100ML; MG/100ML; MG/100ML; MG/100ML
9 INJECTION, SOLUTION INTRAVENOUS CONTINUOUS
Status: DISCONTINUED | OUTPATIENT
Start: 2024-05-20 | End: 2024-05-20

## 2024-05-20 RX ORDER — FENTANYL CITRATE 50 UG/ML
INJECTION, SOLUTION INTRAMUSCULAR; INTRAVENOUS
Status: COMPLETED
Start: 2024-05-20 | End: 2024-05-20

## 2024-05-20 RX ORDER — FAMOTIDINE 20 MG/1
20 TABLET, FILM COATED ORAL ONCE
Status: COMPLETED | OUTPATIENT
Start: 2024-05-20 | End: 2024-05-20

## 2024-05-20 RX ORDER — PROPOFOL 10 MG/ML
VIAL (ML) INTRAVENOUS AS NEEDED
Status: DISCONTINUED | OUTPATIENT
Start: 2024-05-20 | End: 2024-05-20 | Stop reason: SURG

## 2024-05-20 RX ORDER — NALOXONE HCL 0.4 MG/ML
0.4 VIAL (ML) INJECTION
Status: DISCONTINUED | OUTPATIENT
Start: 2024-05-20 | End: 2024-05-21 | Stop reason: HOSPADM

## 2024-05-20 RX ORDER — GLYCOPYRROLATE 0.2 MG/ML
INJECTION INTRAMUSCULAR; INTRAVENOUS AS NEEDED
Status: DISCONTINUED | OUTPATIENT
Start: 2024-05-20 | End: 2024-05-20 | Stop reason: SURG

## 2024-05-20 RX ORDER — EPHEDRINE SULFATE 50 MG/ML
5 INJECTION, SOLUTION INTRAVENOUS ONCE AS NEEDED
Status: DISCONTINUED | OUTPATIENT
Start: 2024-05-20 | End: 2024-05-20 | Stop reason: HOSPADM

## 2024-05-20 RX ORDER — BUPIVACAINE HYDROCHLORIDE 2.5 MG/ML
INJECTION, SOLUTION EPIDURAL; INFILTRATION; INTRACAUDAL
Status: COMPLETED | OUTPATIENT
Start: 2024-05-20 | End: 2024-05-20

## 2024-05-20 RX ORDER — NALOXONE HCL 0.4 MG/ML
0.4 VIAL (ML) INJECTION AS NEEDED
Status: DISCONTINUED | OUTPATIENT
Start: 2024-05-20 | End: 2024-05-20 | Stop reason: HOSPADM

## 2024-05-20 RX ORDER — SODIUM CHLORIDE 0.9 % (FLUSH) 0.9 %
10 SYRINGE (ML) INJECTION EVERY 12 HOURS SCHEDULED
Status: DISCONTINUED | OUTPATIENT
Start: 2024-05-20 | End: 2024-05-20 | Stop reason: HOSPADM

## 2024-05-20 RX ORDER — FENTANYL CITRATE 50 UG/ML
INJECTION, SOLUTION INTRAMUSCULAR; INTRAVENOUS AS NEEDED
Status: DISCONTINUED | OUTPATIENT
Start: 2024-05-20 | End: 2024-05-20 | Stop reason: SURG

## 2024-05-20 RX ADMIN — FENTANYL CITRATE 50 MCG: 50 INJECTION, SOLUTION INTRAMUSCULAR; INTRAVENOUS at 15:32

## 2024-05-20 RX ADMIN — ROCURONIUM BROMIDE 20 MG: 10 INJECTION INTRAVENOUS at 13:59

## 2024-05-20 RX ADMIN — FAMOTIDINE 20 MG: 20 TABLET, FILM COATED ORAL at 21:29

## 2024-05-20 RX ADMIN — MAGNESIUM HYDROXIDE 10 ML: 400 SUSPENSION ORAL at 22:18

## 2024-05-20 RX ADMIN — TERAZOSIN HYDROCHLORIDE 5 MG: 5 CAPSULE ORAL at 21:29

## 2024-05-20 RX ADMIN — ROCURONIUM BROMIDE 50 MG: 10 INJECTION INTRAVENOUS at 12:01

## 2024-05-20 RX ADMIN — LIDOCAINE HYDROCHLORIDE 50 MG: 10 INJECTION, SOLUTION EPIDURAL; INFILTRATION; INTRACAUDAL; PERINEURAL at 12:01

## 2024-05-20 RX ADMIN — GLYCOPYRROLATE 0.2 MCG: 0.2 INJECTION INTRAMUSCULAR; INTRAVENOUS at 12:54

## 2024-05-20 RX ADMIN — SODIUM CHLORIDE, POTASSIUM CHLORIDE, SODIUM LACTATE AND CALCIUM CHLORIDE 75 ML/HR: 600; 310; 30; 20 INJECTION, SOLUTION INTRAVENOUS at 17:25

## 2024-05-20 RX ADMIN — PHYTONADIONE 5 MG: 10 INJECTION, EMULSION INTRAMUSCULAR; INTRAVENOUS; SUBCUTANEOUS at 11:55

## 2024-05-20 RX ADMIN — FENTANYL CITRATE 100 MCG: 50 INJECTION, SOLUTION INTRAMUSCULAR; INTRAVENOUS at 12:01

## 2024-05-20 RX ADMIN — SUGAMMADEX 200 MG: 100 INJECTION, SOLUTION INTRAVENOUS at 14:41

## 2024-05-20 RX ADMIN — PROPOFOL 150 MG: 10 INJECTION, EMULSION INTRAVENOUS at 12:01

## 2024-05-20 RX ADMIN — SODIUM CHLORIDE 2000 MG: 900 INJECTION INTRAVENOUS at 12:10

## 2024-05-20 RX ADMIN — PROPOFOL 30 MG: 10 INJECTION, EMULSION INTRAVENOUS at 14:03

## 2024-05-20 RX ADMIN — PROPOFOL 50 MG: 10 INJECTION, EMULSION INTRAVENOUS at 12:07

## 2024-05-20 RX ADMIN — SODIUM CHLORIDE, POTASSIUM CHLORIDE, SODIUM LACTATE AND CALCIUM CHLORIDE: 600; 310; 30; 20 INJECTION, SOLUTION INTRAVENOUS at 11:59

## 2024-05-20 RX ADMIN — MELOXICAM 7.5 MG: 7.5 TABLET ORAL at 17:25

## 2024-05-20 RX ADMIN — LOSARTAN POTASSIUM 100 MG: 50 TABLET, FILM COATED ORAL at 17:25

## 2024-05-20 RX ADMIN — SODIUM CHLORIDE, POTASSIUM CHLORIDE, SODIUM LACTATE AND CALCIUM CHLORIDE 9 ML/HR: 600; 310; 30; 20 INJECTION, SOLUTION INTRAVENOUS at 10:38

## 2024-05-20 RX ADMIN — DEXAMETHASONE SODIUM PHOSPHATE 4 MG: 4 INJECTION INTRA-ARTICULAR; INTRALESIONAL; INTRAMUSCULAR; INTRAVENOUS; SOFT TISSUE at 12:10

## 2024-05-20 RX ADMIN — SODIUM CHLORIDE 2000 MG: 900 INJECTION INTRAVENOUS at 21:29

## 2024-05-20 RX ADMIN — FAMOTIDINE 20 MG: 20 TABLET ORAL at 10:31

## 2024-05-20 RX ADMIN — ONDANSETRON 4 MG: 2 INJECTION INTRAMUSCULAR; INTRAVENOUS at 14:19

## 2024-05-20 RX ADMIN — DEXAMETHASONE SODIUM PHOSPHATE 4 MG: 10 INJECTION, SOLUTION INTRAMUSCULAR; INTRAVENOUS at 12:08

## 2024-05-20 RX ADMIN — LIDOCAINE HYDROCHLORIDE 0.5 ML: 10 INJECTION, SOLUTION EPIDURAL; INFILTRATION; INTRACAUDAL; PERINEURAL at 10:31

## 2024-05-20 RX ADMIN — BUPIVACAINE HYDROCHLORIDE 60 ML: 2.5 INJECTION, SOLUTION EPIDURAL; INFILTRATION; INTRACAUDAL; PERINEURAL at 12:08

## 2024-05-20 NOTE — H&P
Pre-Op H&P  Uriel Valverde  [id number]  1945      Chief complaint: Hernia x2      Subjective:  Patient is a 79 y.o.male presents for scheduled surgery by Dr. Boykin. He anticipates a RECURRENT RIGHT INGUINAL HERNIA REPAIR WITH MESH; UMBILICAL HERNIA REPAIR WITH MESH  today. He reports right inguinal hernia for about 6 months. He denies changes in bowel habits. No pain. He states the umbilical hernia was an incidental finding on imaging.       Review of Systems:  Constitutional-- No fever, chills or sweats. No fatigue.  CV-- No chest pain, palpitation or syncope. +HTN, afib  +cardiac clearance   Resp-- No SOB, cough, hemoptysis  Skin--No rashes or lesions      Allergies:   Allergies   Allergen Reactions    Other Rash     Soy, mold, and wheat         Home Meds:  Medications Prior to Admission   Medication Sig Dispense Refill Last Dose    allopurinol (ZYLOPRIM) 100 MG tablet Take 1 tablet by mouth Daily. 90 tablet 3 5/19/2024 at 2100    cholecalciferol (Vitamin D, Cholecalciferol,) 25 MCG (1000 UT) tablet Take 1 tablet by mouth Daily. 60 tablet 5 5/20/2024 at 0500    doxazosin (CARDURA) 4 MG tablet Take 1 tablet by mouth Every Night. 90 tablet 3 5/19/2024 at 2100    fexofenadine (ALLEGRA) 180 MG tablet Take 1 tablet by mouth Daily.   5/20/2024 at 0500    folic acid (FOLVITE) 1 MG tablet Take 1 tablet by mouth Daily.   5/20/2024 at 0500    olmesartan (Benicar) 40 MG tablet Take 1 tablet by mouth Daily. 90 tablet 1 5/19/2024    vitamin B-12 (CYANOCOBALAMIN) 500 MCG tablet Take 1 tablet by mouth Daily. 90 tablet 1 5/20/2024 at 0500    warfarin (COUMADIN) 4 MG tablet TAKE 1 TABLET BY MOUTH DAILY TOTAL DOSE OF 9MG 90 tablet 1 5/17/2024    warfarin (COUMADIN) 5 MG tablet TAKE 1 TABLET BY MOUTH DAILY TOTAL DOSE OF 9MG 30 tablet 1 5/17/2024         PMH:   Past Medical History:   Diagnosis Date    A-fib 02/11/2004    chronic coumadin; failed cardioversion; normal echo and exercise nuclear stress test.  He opted not to  have ablation.    A-fib     failed cardioversion, normal echo and exercise nuclear stress test.  He opted not to have ablation    Abnormal CT scan, lung 02/09/2017    RLL opacity with air bronchograms     Allergic Soy    Wheat    Allergic rhinitis     Allergy to wheat     as well as soybean    Anemia     BPH (benign prostatic hyperplasia)     Cobalamin deficiency 11/15/2016    Cough 2015    sec/to inflammatory pnuemonitis    Elbow effusion, left 09/2015    presumed sec/to gout    Gout 2015    right ankle and righ knee (also of right hand-remote); colchicine prn.Uric acid lowering with allopurinol    H/O eye surgery 2003    Hayfever     as child; resolved when family moved to [place] from [place]    Hypertension     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 Rod Whitten    Macrocytic anemia 05/09/2016    Medial meniscus tear 2003    right; arthroscopy knee    Mild anemia 07/22/2022    Sensitivity to sunlight     sun sensitivity rash; sun avoidance, for many years    Shoulder fracture, right     childhood    Spongiotic dermatitis     Tonsillitis     childhood; Tonsillectomy    Vitamin B12 deficiency     Vitamin D deficiency 09/08/2010     PSH:    Past Surgical History:   Procedure Laterality Date    BASAL CELL CARCINOMA EXCISION Left 2024    L face    COLONOSCOPY      EYE SURGERY  2/1/2023    Cataract Removal - both    HERNIA REPAIR      INGUINAL HERNIA REPAIR  1995    KNEE ARTHROSCOPY Right 2003    due to meniscus tear    TONSILLECTOMY      as a child    VASECTOMY         Immunization History:  Influenza: UTD  Pneumococcal: UTD  Tetanus: UTD  Covid : x4    Social History:   Tobacco:   Social History     Tobacco Use   Smoking  "Status Former    Current packs/day: 0.00    Average packs/day: 0.5 packs/day for 22.0 years (11.0 ttl pk-yrs)    Types: Cigarettes    Start date: 1966    Quit date: 1978    Years since quittin.4   Smokeless Tobacco Never   Tobacco Comments    no passive smoke exposure, quit 79      Alcohol:     Social History     Substance and Sexual Activity   Alcohol Use Yes    Alcohol/week: 2.0 standard drinks of alcohol    Types: 2 Glasses of wine per week    Comment: daily         Physical Exam:/67 (BP Location: Right arm, Patient Position: Lying)   Pulse (!) 47   Temp 97.7 °F (36.5 °C) (Temporal)   Resp 16   Ht 182.9 cm (72\")   Wt 98 kg (216 lb)   SpO2 97%   BMI 29.29 kg/m²       General Appearance:    Alert, cooperative, no distress, appears stated age   Head:    Normocephalic, without obvious abnormality, atraumatic   Lungs:     Clear to auscultation bilaterally, respirations unlabored    Heart:   Regular rate and rhythm, S1 and S2 normal    Abdomen:    Soft without tenderness   Extremities:   Extremities normal, atraumatic, no cyanosis or edema   Skin:   Skin color, texture, turgor normal, no rashes or lesions   Neurologic:   Grossly intact     Results Review:     LABS:  Lab Results   Component Value Date    WBC 5.84 2024    HGB 12.0 (L) 2024    HCT 36.0 (L) 2024    .4 (H) 2024     2024    NEUTROABS 1.91 2024    GLUCOSE 87 2024    BUN 34 (H) 2024    CREATININE 1.06 2024    EGFRIFNONA 71 2022     2024    K 4.7 2024     (H) 2024    CO2 26.0 2024    MG 2.2 2022    CALCIUM 9.3 2024    ALBUMIN 4.0 2024    AST 25 2024    ALT 25 2024    BILITOT 0.3 2024       RADIOLOGY:  Study Result    Narrative & Impression   CT PELVIS WO CONTRAST     Date of Exam: 3/9/2024 10:13 AM EST     Indication: K40.90.     Comparison: None available.     Technique: Axial CT " images were obtained of the pelvis without contrast administration.  Reconstructed coronal and sagittal images were also obtained. Automated exposure control and iterative construction methods were used.        Findings:  There is no evidence of fracture. Normal pelvic bone alignment. Mild bilateral SI joint and pubic symphysis degenerative change. There is moderate bilateral hip arthritis with subchondral cyst formation of the acetabulum I. There is no suspicious osseous   lesion.     Intrapelvic soft tissues show scattered mesenteric fat stranding without well-defined fluid collection, greatest in the right lower quadrant. Colonic diverticulosis. Normal appendix. Atherosclerotic calcifications of the aorta and branch vessels.     There is a small right inguinal hernia with mild focal protrusion of nonobstructed small bowel as seen on axial images 28 and 29. There is no evidence of well-defined fluid collection. There is mild surrounding inflammatory change in the right inguinal   region.     IMPRESSION:  Impression:  Haskins type right inguinal hernia containing a portion of nonobstructed small bowel with mild inflammatory change. No evidence of well-defined fluid collection. No evidence of bowel obstruction. Adjacent inflammatory change within the right lower   quadrant mesentery.     Colonic diverticulosis without visible diverticulitis.     Moderate bilateral hip degenerative changes.         I reviewed the patient's new clinical results.    Cancer Staging (if applicable)  Cancer Patient: __ yes __no __unknown; If yes, clinical stage T:__ N:__M:__, stage group or __N/A      Impression: Right inguinal hernia and umbilical hernia      Plan: RECURRENT RIGHT INGUINAL HERNIA REPAIR WITH MESH; UMBILICAL HERNIA REPAIR WITH MESH       GARETT Yeboah   5/20/2024   10:39 EDT    I have reviewed the above note, my prior note(s), appropriate imaging, and labs.  I have again discussed the risks and benefits of right  "inguinal (recurrent) and umbilical hernia repair with mesh with the patient.  All of their questions have been answered.  They understand and wish to proceed with surgical intervention.      Coagulation Cascade  INR   Date Value Ref Range Status   05/13/2024 1.94 (H) 0.89 - 1.12 Final     Protime   Date Value Ref Range Status   05/13/2024 22.3 (H) 12.2 - 14.5 Seconds Final       No components found for: \"ECHO\"            "

## 2024-05-20 NOTE — ANESTHESIA PREPROCEDURE EVALUATION
Anesthesia Evaluation     Patient summary reviewed and Nursing notes reviewed                Airway   Mallampati: II  TM distance: >3 FB  Neck ROM: full  No difficulty expected  Dental - normal exam     Pulmonary - negative pulmonary ROS and normal exam   Cardiovascular - normal exam    (+) hypertension, dysrhythmias (coumadin tx; last dose = 3 days ago) Paroxysmal Atrial Fib, hyperlipidemia      Neuro/Psych- negative ROS  GI/Hepatic/Renal/Endo - negative ROS     Musculoskeletal (-) negative ROS    Abdominal  - normal exam    Bowel sounds: normal.   Substance History - negative use     OB/GYN negative ob/gyn ROS         Other                      Anesthesia Plan    ASA 3     general with block     intravenous induction     Anesthetic plan, risks, benefits, and alternatives have been provided, discussed and informed consent has been obtained with: patient.    Plan discussed with CRNA.    CODE STATUS:

## 2024-05-20 NOTE — ANESTHESIA PROCEDURE NOTES
"Peripheral Block      Patient reassessed immediately prior to procedure    Patient location during procedure: OR  Start time: 5/20/2024 12:08 PM  Reason for block: at surgeon's request and post-op pain management  Performed by  LASHELL/CAA: Irvin Escobar CRNA  Preanesthetic Checklist  Completed: patient identified, IV checked, site marked, risks and benefits discussed, surgical consent, monitors and equipment checked, pre-op evaluation and timeout performed  Prep:  Pt Position: supine  Sterile barriers:cap, gloves, mask and washed/disinfected hands  Prep: ChloraPrep  Patient monitoring: blood pressure monitoring, continuous pulse oximetry and EKG  Procedure    Sedation: yes  Performed under: general  Guidance:ultrasound guided  Images:still images obtained, printed/placed on chart    Laterality:Bilateral  Block Type:TAP  Injection Technique:single-shot  Needle Type:short-bevel and echogenic  Needle Gauge:20 G  Resistance on Injection: none    Medications Used: dexamethasone sodium phosphate injection - Injection   4 mg - 5/20/2024 12:08:00 PM  bupivacaine PF (MARCAINE) 0.25 % injection - Injection   60 mL - 5/20/2024 12:08:00 PM      Medications  Comment:Block Injection:  LA dose divided between Right and Left block        Post Assessment  Injection Assessment: negative aspiration for heme, incremental injection and no paresthesia on injection  Patient Tolerance:comfortable throughout block  Complications:no  Additional Notes    Subcostal TAPs    A high-frequency linear transducer, with sterile cover, was placed sub-xiphoid to identify [place], right and left Rectus Abdominus Muscles (SEAN). The transducer was moved either right or left subcostally to identify the SEAN and the Transverse Abdominus Muscle (LOZANO). The insertion site was prepped in sterile fashion and then localized with 2-5 ml of 1% Lidocaine. Using ultrasound-guidance, a 20-gauge B-Al 4\" Ultraplex 360 non-stimulating echogenic needle was advanced in " "plane, from medial to lateral, until the tip of the needle was in the fascial plane between the SEAN and LOZANO. 1-3ml of preservative free normal saline was used to hydro-dissect the fascial planes. After the fascial plane was verified, the local anesthetic (LA) was injected. The procedure was repeated on the opposite side for bilateral coverage. Aspiration every 5 ml to prevent intravascular injection. Injection was completed with negative aspiration of blood and negative intravascular injection. Injection pressures were normal with minimal resistance. The subcostal approach to the TAP nerve block ideally anesthetizes the intercostal nerves T6-T9.     Mid-Axillary/Lateral TAPs    A high-frequency linear transducer, with sterile cover, was placed in the midaxillary line between the ASIS and costal margin. The External Oblique Muscle (EOM), Internal Oblique Muscle (IOM), Transverse Abdominus Muscle (LOZANO), and Peritoneum were identified. The insertion site was prepped in sterile fashion and then localized with 2-5 ml of 1% Lidocaine. Using ultrasound-guidance, a 20-gauge B-Al 4\" Ultraplex 360 non-stimulating echogenic needle was advanced in plane, from medial to lateral, until the tip of the needle was in the fascial plane between the IOM and LOZANO. 1-3ml of preservative free normal saline was used to hydro-dissect the fascial planes. After the fascial plane was verified, the local anesthetic (LA) was injected. The procedure was repeated on the opposite side for bilateral coverage. Aspiration every 5 ml to prevent intravascular injection. Injection was completed with negative aspiration of blood and negative intravascular injection. Injection pressures were normal with minimal resistance. Midaxillary TAPs should reach intercostal nerves T10- T11 and the subcostal nerve T12.              "

## 2024-05-20 NOTE — ANESTHESIA PROCEDURE NOTES
Airway  Urgency: elective    Date/Time: 5/20/2024 12:05 PM  Airway not difficult    General Information and Staff    Patient location during procedure: OR  CRNA/CAA: Adrian Cano, LASHELL    Indications and Patient Condition  Indications for airway management: airway protection    Preoxygenated: yes  MILS not maintained throughout  Mask difficulty assessment: 1 - vent by mask    Final Airway Details  Final airway type: endotracheal airway      Successful airway: ETT  Cuffed: yes   Successful intubation technique: direct laryngoscopy  Facilitating devices/methods: Bougie and cricoid pressure  Endotracheal tube insertion site: oral  Blade: Kaci  Blade size: 3  ETT size (mm): 7.5  Cormack-Lehane Classification: grade IIb - view of arytenoids or posterior of glottis only  Placement verified by: chest auscultation and capnometry   Cuff volume (mL): 6  Measured from: lips  ETT/EBT  to lips (cm): 22  Number of attempts at approach: 1  Assessment: lips, teeth, and gum same as pre-op and atraumatic intubation    Additional Comments  Negative epigastric sounds, Breath sound equal bilaterally with symmetric chest rise and fall

## 2024-05-20 NOTE — OP NOTE
General Surgery Operative Note    Uriel Valverde  [id number]  1945    Date of Surgery:  5/20/2024 14:57 EDT    Pre-op Diagnosis: Recurrent right inguinal hernia                       Umbilical hernia    Post-op Diagnosis: Recurrent right inguinal hernia, direct               Umbilical hernia, 2.5 cm, reducible    Procedure: Open recurrent inguinal hernia repair with Prolene mesh, right           Umbilical hernia repair, 2.5 cm    Surgeon: Lance Boykin MD    Anesthesia: General with tap blocks    Fluids: 1100 mL of crystalloid    Estimated Blood Loss: Less than 25 mL    Urine Voided: Not recorded    Findings: Recurrent direct right inguinal hernia    Implant: Prolene mesh right inguinal hernia     VentraLex ST, preperitoneal space, umbilical    Complications: None apparent    History:   79-year-old gentleman presents with a right inguinal bulge remote history of inguinal hernia repair.  He had a CT of the pelvis performed demonstrating a umbilical and right inguinal hernia.  He presents today for repair.     The risks and benefits of inguinal hernia repair with mesh were rehashed.  Our discussion included, but was not limited to: bleeding, infection, injury to adjacent viscera (spermatic cord, bowel, ilioinguinal/genitofemoral nerves), seroma, mesh complications (fistula formation etc.), chronic pain, testicular loss, recurrent hernia, and medical issues from a cardiopulmonary and deep venous thrombosis standpoint.  All questions were answered and he understands and wishes to proceed.    Procedure:      After informed consent the patient was taken to the operating room and placed in the supine position on the operating table.  General anesthesia was induced, TAP blocks were placed by anesthesia, the groin was then prepped and draped in the standard sterile fashion.  Appropriate antibiotic prophylaxis was administered to the patient. A timeout was observed.        I began with the recurrent right  inguinal hernia.  The anterior superior iliac spine and pubic tubercle were identified and an incision parallel to this was created.  I dissected down through Antwan's fascia to the external oblique which was incised in the direction of its fibers through the external ring.  Care was taken to identify the ilioinguinal nerve.  I obtained control of the cord structures sharply at the level of the pubic tubercle and encircled them with a Penrose drain.  The cord structures were then freed from the surrounding tissue with the electrocautery Bovie back to the level of the internal ring.  There was a small cord lipoma which was amputated at its base.  He had a direct inguinal hernia.  There was no evidence of an indirect inguinal hernia.  I used 0 Vicryl to reapproximate the internal oblique aponeurosis to the inguinal ligament, closing the direct defect.  The Prolene mesh was then tacked to the pubic tubercle and run laterally along the inguinal ligament with Novafil suture.  A shutter in the mesh was created with adequate laxity for the cord structures themselves.  Then medially, superiorly, and laterally the mesh was tucked under the external oblique.  The wound was copiously irrigated and meticulous hemostasis obtained.  5 mL of Floseal was instilled into the wound and irrigated free with Irrisept.  The external oblique was closed in a running fashion with 3-0 Vicryl, Antwan's fascia was reapproximated with interrupted 3-0 Vicryl, and the skin was closed with a running 4-0 subcuticular Monocryl.  Skin glue was placed as a dressing.  This was then covered with a CoverDerm.     I then turned my attention to the periumbilical hernia.  A curvilinear incision in the supraumbilical area was created in the standard fashion.  I dissected down through the subcutaneous elements to the midline abdominal wall fascia.  I followed the midline down to the hernia sac which was freed from the surrounding tissue.  This was opened and  amputated at the level of the fascia.  The fascial defect was approximately 2.5 cm.  I created a preperitoneal space for the placement of my Ventralex ST circular mesh with the electrocautery Bovie.  The mesh was then placed in the preperitoneal space and tacked to the anterior abdominal wall fascia at 12, 3, 6, 9 and 12:00 with 3-0 Vicryl.  The midline fascial defect was then debrided back to good healthy fascia with curved Forrest scissors.  The midline fascial defect was closed with interrupted 0 Ethibond suture.  The subcutaneous potential space was reapproximated with interrupted 3-0 Vicryl.  And the skin was run with a 4-0 subcuticular Monocryl.  The wound was dressed with Mastisol, Steri-Strips, a Betadine soaked cottonball, Telfa, and a Tegaderm.     All lap and needle counts were reported as correct at the end of the procedure ×2.  The patient was then transferred to the PACU.     Lance Boykin MD     Date: 5/20/2024  Time: 14:57 EDT

## 2024-05-20 NOTE — PLAN OF CARE
Problem: Adult Inpatient Plan of Care  Goal: Plan of Care Review  Outcome: Ongoing, Progressing  Goal: Patient-Specific Goal (Individualized)  Outcome: Ongoing, Progressing  Goal: Absence of Hospital-Acquired Illness or Injury  Outcome: Ongoing, Progressing  Goal: Optimal Comfort and Wellbeing  Outcome: Ongoing, Progressing  Goal: Readiness for Transition of Care  Outcome: Ongoing, Progressing  Intervention: Mutually Develop Transition Plan  Recent Flowsheet Documentation  Taken 5/20/2024 1620 by Danielle Modi RN  Transportation Anticipated: family or friend will provide  Transportation Concerns: none  Patient/Family Anticipated Services at Transition: none  Patient/Family Anticipates Transition to: home with family     Problem: Pain Acute  Goal: Acceptable Pain Control and Functional Ability  Outcome: Ongoing, Progressing     Problem: Fall Injury Risk  Goal: Absence of Fall and Fall-Related Injury  Outcome: Ongoing, Progressing   Goal Outcome Evaluation:

## 2024-05-20 NOTE — ANESTHESIA POSTPROCEDURE EVALUATION
Patient: Uriel Valverde    Procedure Summary       Date: 05/20/24 Room / Location:  [facility] / [facility]    Anesthesia Start: 1159 Anesthesia Stop: 1501    Procedures:       RECURRENT RIGHT INGUINAL HERNIA REPAIR WITH MESH (Right: Abdomen)      UMBILICAL HERNIA REPAIR WITH MESH (Abdomen) Diagnosis:     Surgeons: Lance Boykin MD Provider: Atif Siddiqui MD    Anesthesia Type: general with block ASA Status: 3            Anesthesia Type: general with block    Vitals  Vitals Value Taken Time   BP     Temp     Pulse 54 05/20/24 1501   Resp     SpO2 97 % 05/20/24 1501   Vitals shown include unfiled device data.  146/85  98.5  RR 16      Post Anesthesia Care and Evaluation    Patient location during evaluation: PACU  Patient participation: complete - patient participated  Level of consciousness: awake and alert  Pain score: 0  Pain management: adequate    Airway patency: patent  Anesthetic complications: No anesthetic complications  PONV Status: none  Cardiovascular status: hemodynamically stable and acceptable  Respiratory status: nonlabored ventilation, acceptable and nasal cannula  Hydration status: acceptable

## 2024-05-21 VITALS
RESPIRATION RATE: 16 BRPM | HEIGHT: 72 IN | BODY MASS INDEX: 29.26 KG/M2 | HEART RATE: 47 BPM | SYSTOLIC BLOOD PRESSURE: 126 MMHG | TEMPERATURE: 96 F | WEIGHT: 216 LBS | DIASTOLIC BLOOD PRESSURE: 65 MMHG | OXYGEN SATURATION: 94 %

## 2024-05-21 LAB
DEPRECATED RDW RBC AUTO: 50.9 FL (ref 37–54)
ERYTHROCYTE [DISTWIDTH] IN BLOOD BY AUTOMATED COUNT: 14.4 % (ref 12.3–15.4)
HCT VFR BLD AUTO: 34.9 % (ref 37.5–51)
HGB BLD-MCNC: 11.8 G/DL (ref 13–17.7)
MCH RBC QN AUTO: 33.2 PG (ref 26.6–33)
MCHC RBC AUTO-ENTMCNC: 33.8 G/DL (ref 31.5–35.7)
MCV RBC AUTO: 98.3 FL (ref 79–97)
PLATELET # BLD AUTO: 139 10*3/MM3 (ref 140–450)
PMV BLD AUTO: 10.7 FL (ref 6–12)
RBC # BLD AUTO: 3.55 10*6/MM3 (ref 4.14–5.8)
WBC NRBC COR # BLD AUTO: 10.42 10*3/MM3 (ref 3.4–10.8)

## 2024-05-21 PROCEDURE — 63710000001 FAMOTIDINE 20 MG TABLET: Performed by: SURGERY

## 2024-05-21 PROCEDURE — A9270 NON-COVERED ITEM OR SERVICE: HCPCS | Performed by: SURGERY

## 2024-05-21 PROCEDURE — 25810000003 LACTATED RINGERS PER 1000 ML: Performed by: SURGERY

## 2024-05-21 PROCEDURE — 25010000002 CEFAZOLIN PER 500 MG: Performed by: SURGERY

## 2024-05-21 PROCEDURE — 63710000001 LOSARTAN 50 MG TABLET: Performed by: SURGERY

## 2024-05-21 PROCEDURE — 63710000001 MELOXICAM 7.5 MG TABLET: Performed by: SURGERY

## 2024-05-21 PROCEDURE — 25010000002 HEPARIN (PORCINE) PER 1000 UNITS: Performed by: SURGERY

## 2024-05-21 PROCEDURE — 85027 COMPLETE CBC AUTOMATED: CPT | Performed by: SURGERY

## 2024-05-21 RX ORDER — WARFARIN SODIUM 4 MG/1
TABLET ORAL
Qty: 90 TABLET | Refills: 1 | Status: SHIPPED | OUTPATIENT
Start: 2024-05-21

## 2024-05-21 RX ORDER — DOCUSATE SODIUM 250 MG
250 CAPSULE ORAL DAILY
Qty: 10 CAPSULE | Refills: 0 | Status: SHIPPED | OUTPATIENT
Start: 2024-05-21 | End: 2024-05-24

## 2024-05-21 RX ORDER — HYDROCODONE BITARTRATE AND ACETAMINOPHEN 5; 325 MG/1; MG/1
1 TABLET ORAL EVERY 8 HOURS PRN
Qty: 10 TABLET | Refills: 0 | Status: SHIPPED | OUTPATIENT
Start: 2024-05-21 | End: 2024-05-24

## 2024-05-21 RX ORDER — WARFARIN SODIUM 5 MG/1
TABLET ORAL
Qty: 90 TABLET | Refills: 1 | Status: SHIPPED | OUTPATIENT
Start: 2024-05-21

## 2024-05-21 RX ADMIN — MELOXICAM 7.5 MG: 7.5 TABLET ORAL at 08:12

## 2024-05-21 RX ADMIN — SODIUM CHLORIDE 2000 MG: 900 INJECTION INTRAVENOUS at 03:52

## 2024-05-21 RX ADMIN — HEPARIN SODIUM 5000 UNITS: 5000 INJECTION INTRAVENOUS; SUBCUTANEOUS at 05:21

## 2024-05-21 RX ADMIN — LOSARTAN POTASSIUM 100 MG: 50 TABLET, FILM COATED ORAL at 08:12

## 2024-05-21 RX ADMIN — FAMOTIDINE 20 MG: 20 TABLET, FILM COATED ORAL at 08:12

## 2024-05-21 RX ADMIN — SODIUM CHLORIDE, POTASSIUM CHLORIDE, SODIUM LACTATE AND CALCIUM CHLORIDE 75 ML/HR: 600; 310; 30; 20 INJECTION, SOLUTION INTRAVENOUS at 03:55

## 2024-05-21 NOTE — ADDENDUM NOTE
Addendum  created 05/21/24 0656 by Irvin Escobar, LASHELL    Clinical Note Signed, Diagnosis association updated, Intraprocedure Blocks edited, SmartForm saved

## 2024-05-21 NOTE — CASE MANAGEMENT/SOCIAL WORK
Discharge Planning Assessment  [facility]     Patient Name: Uriel Valverde  MRN: [id number]  Today's Date: 5/21/2024    Admit Date: 5/20/2024        Discharge Needs Assessment       Row Name 05/21/24 0951       Living Environment    People in Home spouse    Current Living Arrangements home    Potentially Unsafe Housing Conditions none    In the past 12 months has the electric, gas, oil, or water company threatened to shut off services in your home? No    Primary Care Provided by self    Provides Primary Care For no one    Family Caregiver if Needed spouse    Quality of Family Relationships involved;supportive    Able to Return to Prior Arrangements yes       Resource/Environmental Concerns    Resource/Environmental Concerns none    Transportation Concerns none       Transportation Needs    In the past 12 months, has lack of transportation kept you from medical appointments or from getting medications? no    In the past 12 months, has lack of transportation kept you from meetings, work, or from getting things needed for daily living? No       Food Insecurity    Within the past 12 months, you worried that your food would run out before you got the money to buy more. Never true    Within the past 12 months, the food you bought just didn't last and you didn't have money to get more. Never true       Transition Planning    Patient/Family Anticipates Transition to home with family    Patient/Family Anticipated Services at Transition none    Transportation Anticipated family or friend will provide       Discharge Needs Assessment    Readmission Within the Last 30 Days no previous admission in last 30 days    Equipment Currently Used at Home none    Concerns to be Addressed no discharge needs identified;denies needs/concerns at this time                   Discharge Plan       Row Name 05/21/24 0951       Plan    Plan Comments Met with patient at the bedside to initiate discharge planning. Patient lives with his wife in  [place]. He is independent with ADLs and has no DME. Patient denies any home or outpatient therapy services. No home oxygen. Patient has [place] [employer] insurance with prescription benefits and prefers to use the [facility] to fill scripts. Patient's goal at discharge is to return home. No discharge needs identified. CM will continue to follow.    Final Discharge Disposition Code 01 - home or self-care                  Continued Care and Services - Admitted Since 5/20/2024    No active coordination exists for this encounter.       Expected Discharge Date and Time       Expected Discharge Date Expected Discharge Time    May 21, 2024            Demographic Summary       Row Name 05/21/24 0950       General Information    Admission Type observation    Arrived From home    Referral Source physician    Reason for Consult discharge planning    Preferred Language English    General Information Comments PCP Barbi Carlson       Contact Information    Permission Granted to Share Info With family/designee    Contact Information Comments Nicolle,June (Spouse)  [phone] (Home Phone)                   Functional Status       Row Name 05/21/24 0950       Functional Status    Usual Activity Tolerance excellent    Current Activity Tolerance good       Functional Status, IADL    Medications independent    Meal Preparation independent    Housekeeping independent    Laundry independent    Shopping independent       Mental Status    General Appearance WDL WDL       Mental Status Summary    Recent Changes in Mental Status/Cognitive Functioning no changes                   Psychosocial    No documentation.                  Abuse/Neglect    No documentation.                  Legal    No documentation.                  Substance Abuse    No documentation.                  Patient Forms    No documentation.                     Holly Delgado RN

## 2024-05-21 NOTE — DISCHARGE SUMMARY
General Surgery Discharge Note (Dr. FERNANDO Boykin)    Barbi Carlson MD    Patient Name:  Uriel Valverde  Admission Date:  5/20/2024  Discharge Date:      Diagnosis/Problems:  Recurrent right inguinal hernia  Umbilical hernia    History & Hospital Course: 79-year-old gentleman presented with a right inguinal bulge.  History of right inguinal hernia repair in the mid 90s.  He had a CT scan of the pelvis demonstrating a periumbilical and right inguinal hernia.  He underwent open repair on 5/20/2024 of both hernias.  He was noted to have a recurrent direct right inguinal hernia and a 2.5 cm umbilical hernia.  These were repaired with mesh, uneventful procedure. The patient is tolerating an appropriate diet and is ready to be discharged.    Temp:  [96 °F (35.6 °C)-98 °F (36.7 °C)] 96 °F (35.6 °C)  Heart Rate:  [40-54] 47  Resp:  [11-18] 16  BP: (123-179)/(65-85) 126/65    Physical: appropriate post operative examination.      Assessment:  Doing well may DC home.    Plan:    DC Home  Diet: Regular low-sodium diet as tolerated.  Restrictions: No heavy lifting greater than 10 lbs x6 weeks.  May shower as needed, no tub bathes.  Do not submerge the incisions underwater.  Remove the outer dressings immediately upon exiting the shower this evening.  Remove the Steri-Strips after 5 days.    Prescriptions: Norco 5 mg tablets #10, no refills    May resume all prior home medications.  No current facility-administered medications on file prior to encounter.     Current Outpatient Medications on File Prior to Encounter   Medication Sig Dispense Refill    allopurinol (ZYLOPRIM) 100 MG tablet Take 1 tablet by mouth Daily. 90 tablet 3    cholecalciferol (Vitamin D, Cholecalciferol,) 25 MCG (1000 UT) tablet Take 1 tablet by mouth Daily. 60 tablet 5    doxazosin (CARDURA) 4 MG tablet Take 1 tablet by mouth Every Night. 90 tablet 3    fexofenadine (ALLEGRA) 180 MG tablet Take 1 tablet by mouth Daily.      folic acid (FOLVITE) 1 MG  tablet Take 1 tablet by mouth Daily.      olmesartan (Benicar) 40 MG tablet Take 1 tablet by mouth Daily. 90 tablet 1    vitamin B-12 (CYANOCOBALAMIN) 500 MCG tablet Take 1 tablet by mouth Daily. 90 tablet 1    warfarin (COUMADIN) 4 MG tablet TAKE 1 TABLET BY MOUTH DAILY TOTAL DOSE OF 9MG 90 tablet 1     He is to resume his normal Coumadin dosage schedule today.  He follows with the Coumadin clinic here at [facility].  Recommend follow-up in 5 days.     Follow up in 3 weeks at [facility], [phone].    Lance Boykin MD,  5/21/2024 - 09:04 EDT

## 2024-05-22 LAB
CYTO UR: NORMAL
LAB AP CASE REPORT: NORMAL
LAB AP CLINICAL INFORMATION: NORMAL
PATH REPORT.FINAL DX SPEC: NORMAL
PATH REPORT.GROSS SPEC: NORMAL

## 2024-05-24 ENCOUNTER — LAB (OUTPATIENT)
Dept: LAB | Facility: HOSPITAL | Age: 79
End: 2024-05-24
Payer: MEDICARE

## 2024-05-24 ENCOUNTER — OFFICE VISIT (OUTPATIENT)
Dept: INTERNAL MEDICINE | Facility: CLINIC | Age: 79
End: 2024-05-24
Payer: MEDICARE

## 2024-05-24 VITALS
BODY MASS INDEX: 29.39 KG/M2 | WEIGHT: 217 LBS | TEMPERATURE: 97.5 F | HEIGHT: 72 IN | SYSTOLIC BLOOD PRESSURE: 130 MMHG | OXYGEN SATURATION: 99 % | HEART RATE: 44 BPM | DIASTOLIC BLOOD PRESSURE: 68 MMHG

## 2024-05-24 DIAGNOSIS — Z87.19 S/P HERNIA REPAIR: ICD-10-CM

## 2024-05-24 DIAGNOSIS — I48.20 CHRONIC ATRIAL FIBRILLATION: ICD-10-CM

## 2024-05-24 DIAGNOSIS — K40.91 RECURRENT RIGHT INGUINAL HERNIA: Primary | ICD-10-CM

## 2024-05-24 DIAGNOSIS — Z79.01 ANTICOAGULATED ON WARFARIN: ICD-10-CM

## 2024-05-24 DIAGNOSIS — Z98.890 S/P HERNIA REPAIR: ICD-10-CM

## 2024-05-24 DIAGNOSIS — I10 BENIGN ESSENTIAL HYPERTENSION: ICD-10-CM

## 2024-05-24 DIAGNOSIS — D64.9 MILD ANEMIA: ICD-10-CM

## 2024-05-24 DIAGNOSIS — K42.9 UMBILICAL HERNIA WITHOUT OBSTRUCTION AND WITHOUT GANGRENE: ICD-10-CM

## 2024-05-24 DIAGNOSIS — Z51.81 THERAPEUTIC DRUG MONITORING: ICD-10-CM

## 2024-05-24 PROBLEM — K40.90 RIGHT INGUINAL HERNIA: Status: RESOLVED | Noted: 2024-05-20 | Resolved: 2024-05-24

## 2024-05-24 LAB
INR PPP: 1.15 (ref 0.89–1.12)
PROTHROMBIN TIME: 14.8 SECONDS (ref 12.2–14.5)

## 2024-05-24 PROCEDURE — 85610 PROTHROMBIN TIME: CPT

## 2024-05-24 PROCEDURE — 3078F DIAST BP <80 MM HG: CPT | Performed by: INTERNAL MEDICINE

## 2024-05-24 PROCEDURE — 1159F MED LIST DOCD IN RCRD: CPT | Performed by: INTERNAL MEDICINE

## 2024-05-24 PROCEDURE — 1160F RVW MEDS BY RX/DR IN RCRD: CPT | Performed by: INTERNAL MEDICINE

## 2024-05-24 PROCEDURE — 99214 OFFICE O/P EST MOD 30 MIN: CPT | Performed by: INTERNAL MEDICINE

## 2024-05-24 PROCEDURE — 1126F AMNT PAIN NOTED NONE PRSNT: CPT | Performed by: INTERNAL MEDICINE

## 2024-05-24 PROCEDURE — 3075F SYST BP GE 130 - 139MM HG: CPT | Performed by: INTERNAL MEDICINE

## 2024-05-24 RX ORDER — SENNOSIDES 8.6 MG
650 CAPSULE ORAL EVERY 8 HOURS PRN
COMMUNITY

## 2024-05-24 RX ORDER — OLMESARTAN MEDOXOMIL 40 MG/1
40 TABLET ORAL DAILY
Qty: 90 TABLET | Refills: 1 | Status: SHIPPED | OUTPATIENT
Start: 2024-05-24

## 2024-05-24 NOTE — PROGRESS NOTES
[facility]     Uriel Valverde  1945   [id number]      Patient Care Team:  Barbi Carlosn MD as PCP - General (Internal Medicine)    Chief Complaint   Patient presents with    Hospital Follow Up Visit     Admitted 5/20/2024 - 5/21/2024 (25 hours)  [facility]        Patient is a 79 y.o. male.   History of Present Illness  The patient is here for an office visit.    The patient underwent a recurrent right inguinal hernia repair and umbilical hernia repair on 5/20/24 Monday. He reports minimal soreness, and the surgical incision appears to be healing well. He has not required hydrocodone for pain management. His bowel movements have been regular, having had a consistent bowel movement for the last three days. He utilized a stool softener during the initial 2 days post-surgery but no longer needs it. He has been making efforts to maintain a high fluid intake, consuming approximately one gallon of water daily. A follow-up appointment with Dr. Boykin is scheduled for two weeks from now. He resumed his warfarin regimen right after surgery. He has not been monitoring his blood pressure at home post-surgery, but blood pressure readings at the hospital were very good..         CHRONIC CONDITIONS      Past Medical History:   Diagnosis Date    A-fib 02/11/2004    chronic coumadin; failed cardioversion; normal echo and exercise nuclear stress test.  He opted not to have ablation.    A-fib     failed cardioversion, normal echo and exercise nuclear stress test.  He opted not to have ablation    Abnormal CT scan, lung 02/09/2017    RLL opacity with air bronchograms     Allergic Soy    Wheat    Allergic rhinitis     Allergy to wheat     as well as soybean    Anemia     BPH (benign prostatic hyperplasia)     Cobalamin deficiency 11/15/2016    Cough 2015    sec/to inflammatory pnuemonitis    Elbow effusion, left 09/2015    presumed sec/to gout    Gout 2015    right ankle and righ knee (also of  right hand-remote); colchicine prn.Uric acid lowering with allopurinol    H/O eye surgery 2003    Hayfever     as child; resolved when family moved to [place] from [place]    Hypertension     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 Rod Whitten    Macrocytic anemia 05/09/2016    Medial meniscus tear 2003    right; arthroscopy knee    Mild anemia 07/22/2022    Sensitivity to sunlight     sun sensitivity rash; sun avoidance, for many years    Shoulder fracture, right     childhood    Spongiotic dermatitis     Tonsillitis     childhood; Tonsillectomy    Vitamin B12 deficiency     Vitamin D deficiency 09/08/2010       Past Surgical History:   Procedure Laterality Date    BASAL CELL CARCINOMA EXCISION Left 2024    L face    COLONOSCOPY      EYE SURGERY  2/1/2023    Cataract Removal - both    HERNIA REPAIR      INGUINAL HERNIA REPAIR  1995    INGUINAL HERNIA REPAIR Right 5/20/2024    Procedure: RECURRENT RIGHT INGUINAL HERNIA REPAIR WITH MESH;  Surgeon: Lance Boykin MD;  Location:  [facility];  Service: General;  Laterality: Right;    KNEE ARTHROSCOPY Right 2003    due to meniscus tear    TONSILLECTOMY      as a child    UMBILICAL HERNIA REPAIR N/A 5/20/2024    Procedure: UMBILICAL HERNIA REPAIR WITH MESH;  Surgeon: Lance Boykin MD;  Location:  [facility];  Service: General;  Laterality: N/A;    VASECTOMY         Family History   Problem Relation Age of Onset    Heart failure Father     Coronary artery disease Father         COD    Stroke Sister 63    Cancer Sister         Lung Cancer       Social History     Socioeconomic History    Marital status:    Tobacco Use    Smoking status: Former     Current  "packs/day: 0.00     Average packs/day: 0.5 packs/day for 22.0 years (11.0 ttl pk-yrs)     Types: Cigarettes     Start date: 1966     Quit date: 1978     Years since quittin.4    Smokeless tobacco: Never    Tobacco comments:     no passive smoke exposure, quit 79   Vaping Use    Vaping status: Never Used   Substance and Sexual Activity    Alcohol use: Yes     Alcohol/week: 2.0 standard drinks of alcohol     Types: 2 Glasses of wine per week     Comment: daily    Drug use: No    Sexual activity: Not Currently     Birth control/protection: Vasectomy       Allergies   Allergen Reactions    Other Rash     Soy, mold, and wheat       Review of Systems:     Review of Systems    Vital Signs  Vitals:    24 1025 24 1040   BP: 162/78 130/68   BP Location: Left arm    Patient Position: Sitting    Cuff Size: Adult    Pulse: (!) 44    Temp: 97.5 °F (36.4 °C)    TempSrc: Infrared    SpO2: 99%    Weight: 98.4 kg (217 lb)    Height: 182.9 cm (72.01\")      Body mass index is 29.42 kg/m².  BMI is >= 25 and <30. (Overweight) The following options were offered after discussion;: weight loss educational material (shared in after visit summary), exercise counseling/recommendations, nutrition counseling/recommendations, and Information on healthy weight added to patient's after visit summary.          Current Outpatient Medications:     acetaminophen (TYLENOL) 650 MG 8 hr tablet, Take 1 tablet by mouth Every 8 (Eight) Hours As Needed for Mild Pain., Disp: , Rfl:     allopurinol (ZYLOPRIM) 100 MG tablet, Take 1 tablet by mouth Daily., Disp: 90 tablet, Rfl: 3    cholecalciferol (Vitamin D, Cholecalciferol,) 25 MCG (1000 UT) tablet, Take 1 tablet by mouth Daily., Disp: 60 tablet, Rfl: 5    doxazosin (CARDURA) 4 MG tablet, Take 1 tablet by mouth Every Night., Disp: 90 tablet, Rfl: 3    fexofenadine (ALLEGRA) 180 MG tablet, Take 1 tablet by mouth Daily., Disp: , Rfl:     folic acid (FOLVITE) 1 MG tablet, Take 1 " tablet by mouth Daily., Disp: , Rfl:     olmesartan (Benicar) 40 MG tablet, Take 1 tablet by mouth Daily., Disp: 90 tablet, Rfl: 1    vitamin B-12 (CYANOCOBALAMIN) 500 MCG tablet, Take 1 tablet by mouth Daily., Disp: 90 tablet, Rfl: 1    warfarin (COUMADIN) 4 MG tablet, TAKE 1 TABLET BY MOUTH DAILY TOTAL DOSE OF 9MG, Disp: 90 tablet, Rfl: 1    warfarin (COUMADIN) 5 MG tablet, TAKE 1 TABLET BY MOUTH DAILY TOTAL DOSE OF 9MG, Disp: 90 tablet, Rfl: 1    Physical Exam:    Physical Exam     ACE III MINI        Results Review:    I reviewed the patient's new clinical results.  Results  Laboratory Studies  PT/INR was 1.72. Mild anemia was observed. Platelets were slightly low on XX / 21/2024.       CMP:  Lab Results   Component Value Date    BUN 34 (H) 05/13/2024    CREATININE 1.06 05/13/2024    EGFRIFNONA 71 01/03/2022    BCR 32.1 (H) 05/13/2024     05/13/2024    K 4.7 05/13/2024    CO2 26.0 05/13/2024    CALCIUM 9.3 05/13/2024    ALBUMIN 4.0 05/13/2024    BILITOT 0.3 05/13/2024    ALKPHOS 78 05/13/2024    AST 25 05/13/2024    ALT 25 05/13/2024     HbA1c:  Lab Results   Component Value Date    HGBA1C 5.80 (H) 07/19/2022     Microalbumin:  Lab Results   Component Value Date    MICROALBUR <1.2 02/22/2024     Lipid Panel  Lab Results   Component Value Date    CHOL 179 02/22/2024    TRIG 51 02/22/2024    HDL 73 (H) 02/22/2024    LDL 96 02/22/2024    AST 25 05/13/2024    ALT 25 05/13/2024       Medication Review: Medications reviewed and noted  Patient Instructions   Problem List Items Addressed This Visit          Cardiac and Vasculature    Benign essential hypertension - Primary    Overview     Taking olmesartan daily and doxazosin every evening.           Relevant Medications    doxazosin (CARDURA) 4 MG tablet    olmesartan (Benicar) 40 MG tablet          Diagnosis Plan   1. Benign essential hypertension  olmesartan (Benicar) 40 MG tablet        Assessment & Plan  1. Recurrent right inguinal hernia and umbilical  hernia.  The patient is demonstrating satisfactory progress post-hernia repair. Today's examination revealed clean incisions, albeit with minor swelling below the right inguinal hernia area. There is no erythema or drainage around the incisions. The patient is advised to continue moving slowly and avoid lifting or working for the ensuing weeks. He may engage in leisure walking exercises.    2. Swelling.  The swelling is mild today. The patient's bowel movements are regular, however, a stool softener may be used if required. The patient is encouraged to sit in a recliner leaning back for extended periods during the day. A cold pack may be used on the lower abdominal area if the swelling worsens. The patient is advised to maintain high fluid intake.    3. Hypertension.  The patient is advised to continue doxazosin nightly and olmesartan daily, and to avoid salt in his diet.    4. Mild anemia.  The condition is stable. The patient's levels will be monitored.    5. Chronic atrial fibrillation.  The patient will continue warfarin daily. PT/INR will be rechecked today.    Follow-up  The patient is scheduled for a fasting follow-up appointment on 08/12/2024.         Plan of care reviewed with patient at the conclusion of today's visit. Education was provided regarding diagnosis, management, and any prescribed or recommended OTC medications. Patient verbalizes understanding of and agreement with management plan.         05/24/24   10:27 EDT    Patient or patient representative verbalized consent for the use of Ambient Listening during the visit with  Barbi Carlson MD for chart documentation. 5/24/2024  10:42 EDT

## 2024-05-24 NOTE — PATIENT INSTRUCTIONS
Patient Instructions  Problem List Items Addressed This Visit          Cardiac and Vasculature    Benign essential hypertension - Primary    Overview     Taking olmesartan daily and doxazosin every evening.           Relevant Medications    doxazosin (CARDURA) 4 MG tablet    olmesartan (Benicar) 40 MG tablet       Exercising to Stay Healthy  To become healthy and stay healthy, it is recommended that you do moderate-intensity and vigorous-intensity exercise. You can tell that you are exercising at a moderate intensity if your heart starts beating faster and you start breathing faster but can still hold a conversation. You can tell that you are exercising at a vigorous intensity if you are breathing much harder and faster and cannot hold a conversation while exercising.  How can exercise benefit me?  Exercising regularly is important. It has many health benefits, such as:  Improving overall fitness, flexibility, and endurance.  Increasing bone density.  Helping with weight control.  Decreasing body fat.  Increasing muscle strength and endurance.  Reducing stress and tension, anxiety, depression, or anger.  Improving overall health.  What guidelines should I follow while exercising?  Before you start a new exercise program, talk with your health care provider.  Do not exercise so much that you hurt yourself, feel dizzy, or get very short of breath.  Wear comfortable clothes and wear shoes with good support.  Drink plenty of water while you exercise to prevent dehydration or heat stroke.  Work out until your breathing and your heartbeat get faster (moderate intensity).  How often should I exercise?  Choose an activity that you enjoy, and set realistic goals. Your health care provider can help you make an activity plan that is individually designed and works best for you.  Exercise regularly as told by your health care provider. This may include:  Doing strength training two times a week, such as:  Lifting  weights.  Using resistance bands.  Push-ups.  Sit-ups.  Yoga.  Doing a certain intensity of exercise for a given amount of time. Choose from these options:  A total of 150 minutes of moderate-intensity exercise every week.  A total of 75 minutes of vigorous-intensity exercise every week.  A mix of moderate-intensity and vigorous-intensity exercise every week.  Children, pregnant women, people who have not exercised regularly, people who are overweight, and older adults may need to talk with a health care provider about what activities are safe to perform. If you have a medical condition, be sure to talk with your health care provider before you start a new exercise program.  What are some exercise ideas?  Moderate-intensity exercise ideas include:  Walking 1 mile (1.6 km) in about 15 minutes.  Biking.  Hiking.  Golfing.  Dancing.  Water aerobics.  Vigorous-intensity exercise ideas include:  Walking 4.5 miles (7.2 km) or more in about 1 hour.  Jogging or running 5 miles (8 km) in about 1 hour.  Biking 10 miles (16.1 km) or more in about 1 hour.  Lap swimming.  Roller-skating or in-line skating.  Cross-country skiing.  Vigorous competitive sports, such as football, basketball, and soccer.  Jumping rope.  Aerobic dancing.  What are some everyday activities that can help me get exercise?  Yard work, such as:  Pushing a .  Raking and bagging leaves.  Washing your car.  Pushing a stroller.  Shoveling snow.  Gardening.  Washing windows or floors.  How can I be more active in my day-to-day activities?  Use stairs instead of an elevator.  Take a walk during your lunch break.  If you drive, park your car farther away from your work or school.  If you take public transportation, get off one stop early and walk the rest of the way.  Stand up or walk around during all of your indoor phone calls.  Get up, stretch, and walk around every 30 minutes throughout the day.  Enjoy exercise with a friend. Support to continue  exercising will help you keep a regular routine of activity.  Where to find more information  You can find more information about exercising to stay healthy from:  [place] Department of Health and Human Services: www.hhs.gov  Centers for Disease Control and Prevention (CDC): www.cdc.gov  Summary  Exercising regularly is important. It will improve your overall fitness, flexibility, and endurance.  Regular exercise will also improve your overall health. It can help you control your weight, reduce stress, and improve your bone density.  Do not exercise so much that you hurt yourself, feel dizzy, or get very short of breath.  Before you start a new exercise program, talk with your health care provider.  This information is not intended to replace advice given to you by your health care provider. Make sure you discuss any questions you have with your health care provider.  Document Revised: 04/15/2022 Document Reviewed: 04/15/2022  Elsevier Patient Education © 2023 [employer]

## 2024-05-29 NOTE — DISCHARGE INSTRUCTIONS
The following information and instructions were given:    Do not eat, drink, smoke or chew gum after midnight the night before surgery. This includes no mints.  Take all routine, prescribed medications including heart and blood pressure medicines with a sip of water unless otherwise instructed by your physician.   Do NOT take diabetic medication unless instructed by your physician.      DO NOT shave for two days before your procedure.  Do not wear makeup.      DO NOT wear fingernail polish (gel/regular) and/or acrylic/artificial nails on the day of surgery. If you had a recent manicure and would rather not remove polish or artificial nails, the minimum requirement is that the polish/artificial nails must be removed from the middle finger on each hand.      If you are having surgery/procedure on an upper extremity, fingernail polish/artificial fingernails must be removed for surgery.  NO EXCEPTIONS.      If you are having surgery/procedure on a lower extremity, toenail polish on both extremities must be removed for surgery.  NO EXCEPTIONS.    Remove all jewelry (advise to go to jeweler if unable to remove).  Jewelry, especially rings, can no longer be taped for surgery.    Leave anything you consider valuable at home.    Leave your suitcase in the car until after your surgery if you are staying overnight.    Bring the following with you the day of your procedure (when applicable):       -Picture ID and insurance cards       -Co-pay/deductible required by insurance       -Medications in the original bottles or a detailed list (name, dosage, frequency of medications) including all over-the-counter medications if not brought to PAT       -Copy of advance directive, living will or power of  documents if not brought to PAT       -CPAP or BIPAP mask and tubing (do not bring machine)       -Skin prep instruction(s) sheet       -PAT Pass    Educational handout or binder (joint replacements) related to procedure given  to patient.  Educational handout also includes general information related to the recovery that mentions signs and symptoms of infection and when to call the doctor.    When applicable, an ERAS handout was given to patient.    Respirex use and pneumonia prevention education provided in Pre Admission Testing general education video.    Information related to infection and hand hygiene mentioned in Pre Admission Testing general education video. Patient instructed to call their doctor if any of the following symptoms are noted during recovery:  Fever of 100.4 F or higher, incision that is warm or has increasing bleeding, redness or drainage.    DVT Prevention instructions given in general education video presentation during Pre Admission Testing appointment that stress the importance of ambulation to improve blood circulation.  Also encouraged patient to perform foot exercises when in bed and application of a sequential device may be applied to lower extremities to improve circulation.      Please apply Chlorhexidine wipes to surgical area (if instructed) the night before procedure and the AM of procedure and document date/time of applications on skin prep instruction sheet.         room air

## 2024-06-06 ENCOUNTER — LAB (OUTPATIENT)
Dept: LAB | Facility: HOSPITAL | Age: 79
End: 2024-06-06
Payer: MEDICARE

## 2024-06-06 DIAGNOSIS — Z79.01 ANTICOAGULATED ON WARFARIN: ICD-10-CM

## 2024-06-06 DIAGNOSIS — Z51.81 THERAPEUTIC DRUG MONITORING: ICD-10-CM

## 2024-06-06 LAB
INR PPP: 2.13 (ref 0.89–1.12)
PROTHROMBIN TIME: 24 SECONDS (ref 12.2–14.5)

## 2024-06-06 PROCEDURE — 85610 PROTHROMBIN TIME: CPT

## 2024-06-20 RX ORDER — ALLOPURINOL 100 MG/1
100 TABLET ORAL DAILY
Qty: 90 TABLET | Refills: 3 | Status: SHIPPED | OUTPATIENT
Start: 2024-06-20

## 2024-06-20 RX ORDER — DOXAZOSIN MESYLATE 4 MG/1
4 TABLET ORAL NIGHTLY
Qty: 90 TABLET | Refills: 3 | Status: SHIPPED | OUTPATIENT
Start: 2024-06-20

## 2024-07-05 ENCOUNTER — LAB (OUTPATIENT)
Dept: LAB | Facility: HOSPITAL | Age: 79
End: 2024-07-05
Payer: MEDICARE

## 2024-07-05 DIAGNOSIS — Z51.81 THERAPEUTIC DRUG MONITORING: ICD-10-CM

## 2024-07-05 DIAGNOSIS — Z79.01 ANTICOAGULATED ON WARFARIN: ICD-10-CM

## 2024-07-05 LAB
INR PPP: 2.34 (ref 0.89–1.12)
PROTHROMBIN TIME: 25.9 SECONDS (ref 12.2–14.5)

## 2024-07-05 PROCEDURE — 85610 PROTHROMBIN TIME: CPT

## 2024-08-09 ENCOUNTER — LAB (OUTPATIENT)
Dept: LAB | Facility: HOSPITAL | Age: 79
End: 2024-08-09
Payer: MEDICARE

## 2024-08-09 DIAGNOSIS — E78.2 MIXED HYPERLIPIDEMIA: ICD-10-CM

## 2024-08-09 DIAGNOSIS — E55.9 VITAMIN D DEFICIENCY: Chronic | ICD-10-CM

## 2024-08-09 DIAGNOSIS — Z79.01 ANTICOAGULATED ON WARFARIN: ICD-10-CM

## 2024-08-09 DIAGNOSIS — Z51.81 THERAPEUTIC DRUG MONITORING: ICD-10-CM

## 2024-08-09 DIAGNOSIS — I10 BENIGN ESSENTIAL HYPERTENSION: ICD-10-CM

## 2024-08-09 LAB
25(OH)D3 SERPL-MCNC: 88.3 NG/ML (ref 30–100)
ALBUMIN SERPL-MCNC: 4.3 G/DL (ref 3.5–5.2)
ALBUMIN UR-MCNC: <1.2 MG/DL
ALBUMIN/GLOB SERPL: 1.9 G/DL
ALP SERPL-CCNC: 77 U/L (ref 39–117)
ALT SERPL W P-5'-P-CCNC: 17 U/L (ref 1–41)
ANION GAP SERPL CALCULATED.3IONS-SCNC: 10 MMOL/L (ref 5–15)
AST SERPL-CCNC: 24 U/L (ref 1–40)
BACTERIA UR QL AUTO: NORMAL /HPF
BASOPHILS # BLD AUTO: 0.05 10*3/MM3 (ref 0–0.2)
BASOPHILS NFR BLD AUTO: 0.6 % (ref 0–1.5)
BILIRUB SERPL-MCNC: 1 MG/DL (ref 0–1.2)
BILIRUB UR QL STRIP: NEGATIVE
BUN SERPL-MCNC: 29 MG/DL (ref 8–23)
BUN/CREAT SERPL: 24.2 (ref 7–25)
CALCIUM SPEC-SCNC: 10.1 MG/DL (ref 8.6–10.5)
CHLORIDE SERPL-SCNC: 107 MMOL/L (ref 98–107)
CHOLEST SERPL-MCNC: 210 MG/DL (ref 0–200)
CLARITY UR: CLEAR
CO2 SERPL-SCNC: 25 MMOL/L (ref 22–29)
COLOR UR: YELLOW
CREAT SERPL-MCNC: 1.2 MG/DL (ref 0.76–1.27)
CREAT UR-MCNC: 47.7 MG/DL
DEPRECATED RDW RBC AUTO: 47.3 FL (ref 37–54)
EGFRCR SERPLBLD CKD-EPI 2021: 61.9 ML/MIN/1.73
EOSINOPHIL # BLD AUTO: 0.2 10*3/MM3 (ref 0–0.4)
EOSINOPHIL NFR BLD AUTO: 2.5 % (ref 0.3–6.2)
ERYTHROCYTE [DISTWIDTH] IN BLOOD BY AUTOMATED COUNT: 13.1 % (ref 12.3–15.4)
GLOBULIN UR ELPH-MCNC: 2.3 GM/DL
GLUCOSE SERPL-MCNC: 108 MG/DL (ref 65–99)
GLUCOSE UR STRIP-MCNC: NEGATIVE MG/DL
HCT VFR BLD AUTO: 36 % (ref 37.5–51)
HDLC SERPL-MCNC: 77 MG/DL (ref 40–60)
HGB BLD-MCNC: 12.2 G/DL (ref 13–17.7)
HGB UR QL STRIP.AUTO: NEGATIVE
HYALINE CASTS UR QL AUTO: NORMAL /LPF
IMM GRANULOCYTES # BLD AUTO: 0.02 10*3/MM3 (ref 0–0.05)
IMM GRANULOCYTES NFR BLD AUTO: 0.2 % (ref 0–0.5)
INR PPP: 1.66 (ref 0.89–1.12)
KETONES UR QL STRIP: NEGATIVE
LDLC SERPL CALC-MCNC: 125 MG/DL (ref 0–100)
LDLC/HDLC SERPL: 1.61 {RATIO}
LEUKOCYTE ESTERASE UR QL STRIP.AUTO: NEGATIVE
LYMPHOCYTES # BLD AUTO: 3.86 10*3/MM3 (ref 0.7–3.1)
LYMPHOCYTES NFR BLD AUTO: 48.1 % (ref 19.6–45.3)
MCH RBC QN AUTO: 33.7 PG (ref 26.6–33)
MCHC RBC AUTO-ENTMCNC: 33.9 G/DL (ref 31.5–35.7)
MCV RBC AUTO: 99.4 FL (ref 79–97)
MICROALBUMIN/CREAT UR: NORMAL MG/G{CREAT}
MONOCYTES # BLD AUTO: 0.38 10*3/MM3 (ref 0.1–0.9)
MONOCYTES NFR BLD AUTO: 4.7 % (ref 5–12)
NEUTROPHILS NFR BLD AUTO: 3.51 10*3/MM3 (ref 1.7–7)
NEUTROPHILS NFR BLD AUTO: 43.9 % (ref 42.7–76)
NITRITE UR QL STRIP: NEGATIVE
NRBC BLD AUTO-RTO: 0 /100 WBC (ref 0–0.2)
PH UR STRIP.AUTO: 6 [PH] (ref 5–8)
PLATELET # BLD AUTO: 155 10*3/MM3 (ref 140–450)
PMV BLD AUTO: 10.8 FL (ref 6–12)
POTASSIUM SERPL-SCNC: 4.3 MMOL/L (ref 3.5–5.2)
PROT SERPL-MCNC: 6.6 G/DL (ref 6–8.5)
PROT UR QL STRIP: NEGATIVE
PROTHROMBIN TIME: 19.7 SECONDS (ref 12.2–14.5)
RBC # BLD AUTO: 3.62 10*6/MM3 (ref 4.14–5.8)
RBC # UR STRIP: NORMAL /HPF
REF LAB TEST METHOD: NORMAL
SODIUM SERPL-SCNC: 142 MMOL/L (ref 136–145)
SP GR UR STRIP: 1.01 (ref 1–1.03)
SQUAMOUS #/AREA URNS HPF: NORMAL /HPF
TRIGL SERPL-MCNC: 46 MG/DL (ref 0–150)
TSH SERPL DL<=0.05 MIU/L-ACNC: 1.28 UIU/ML (ref 0.27–4.2)
UROBILINOGEN UR QL STRIP: NORMAL
VLDLC SERPL-MCNC: 8 MG/DL (ref 5–40)
WBC # UR STRIP: NORMAL /HPF
WBC NRBC COR # BLD AUTO: 8.02 10*3/MM3 (ref 3.4–10.8)

## 2024-08-09 PROCEDURE — 80053 COMPREHEN METABOLIC PANEL: CPT

## 2024-08-09 PROCEDURE — 82043 UR ALBUMIN QUANTITATIVE: CPT

## 2024-08-09 PROCEDURE — 80061 LIPID PANEL: CPT

## 2024-08-09 PROCEDURE — 82306 VITAMIN D 25 HYDROXY: CPT

## 2024-08-09 PROCEDURE — 81001 URINALYSIS AUTO W/SCOPE: CPT

## 2024-08-09 PROCEDURE — 84443 ASSAY THYROID STIM HORMONE: CPT

## 2024-08-09 PROCEDURE — 85025 COMPLETE CBC W/AUTO DIFF WBC: CPT

## 2024-08-09 PROCEDURE — 85610 PROTHROMBIN TIME: CPT

## 2024-08-09 PROCEDURE — 82570 ASSAY OF URINE CREATININE: CPT

## 2024-08-12 ENCOUNTER — OFFICE VISIT (OUTPATIENT)
Dept: INTERNAL MEDICINE | Facility: CLINIC | Age: 79
End: 2024-08-12
Payer: MEDICARE

## 2024-08-12 VITALS
TEMPERATURE: 97.7 F | HEIGHT: 72 IN | OXYGEN SATURATION: 100 % | BODY MASS INDEX: 29.53 KG/M2 | DIASTOLIC BLOOD PRESSURE: 58 MMHG | SYSTOLIC BLOOD PRESSURE: 120 MMHG | WEIGHT: 218 LBS | HEART RATE: 72 BPM

## 2024-08-12 DIAGNOSIS — I48.20 CHRONIC ATRIAL FIBRILLATION: ICD-10-CM

## 2024-08-12 DIAGNOSIS — Z00.00 ANNUAL PHYSICAL EXAM: ICD-10-CM

## 2024-08-12 DIAGNOSIS — E66.3 OVERWEIGHT WITH BODY MASS INDEX (BMI) OF 29 TO 29.9 IN ADULT: Chronic | ICD-10-CM

## 2024-08-12 DIAGNOSIS — E55.9 VITAMIN D DEFICIENCY: Chronic | ICD-10-CM

## 2024-08-12 DIAGNOSIS — Z00.00 MEDICARE ANNUAL WELLNESS VISIT, SUBSEQUENT: Primary | ICD-10-CM

## 2024-08-12 DIAGNOSIS — I10 BENIGN ESSENTIAL HYPERTENSION: ICD-10-CM

## 2024-08-12 DIAGNOSIS — E78.2 MIXED HYPERLIPIDEMIA: Chronic | ICD-10-CM

## 2024-08-12 DIAGNOSIS — Z87.39 PERSONAL HISTORY OF GOUT: Chronic | ICD-10-CM

## 2024-08-12 PROCEDURE — 1126F AMNT PAIN NOTED NONE PRSNT: CPT | Performed by: INTERNAL MEDICINE

## 2024-08-12 PROCEDURE — 3074F SYST BP LT 130 MM HG: CPT | Performed by: INTERNAL MEDICINE

## 2024-08-12 PROCEDURE — 1159F MED LIST DOCD IN RCRD: CPT | Performed by: INTERNAL MEDICINE

## 2024-08-12 PROCEDURE — 1170F FXNL STATUS ASSESSED: CPT | Performed by: INTERNAL MEDICINE

## 2024-08-12 PROCEDURE — G0439 PPPS, SUBSEQ VISIT: HCPCS | Performed by: INTERNAL MEDICINE

## 2024-08-12 PROCEDURE — 3078F DIAST BP <80 MM HG: CPT | Performed by: INTERNAL MEDICINE

## 2024-08-12 PROCEDURE — 99397 PER PM REEVAL EST PAT 65+ YR: CPT | Performed by: INTERNAL MEDICINE

## 2024-08-12 PROCEDURE — 1160F RVW MEDS BY RX/DR IN RCRD: CPT | Performed by: INTERNAL MEDICINE

## 2024-08-12 RX ORDER — OLMESARTAN MEDOXOMIL 40 MG/1
40 TABLET ORAL DAILY
Qty: 90 TABLET | Refills: 1 | Status: SHIPPED | OUTPATIENT
Start: 2024-08-12

## 2024-08-12 NOTE — PROGRESS NOTES
Subjective   The ABCs of the Annual Wellness Visit  Medicare Wellness Visit      Uriel Valverde is a 79 y.o. patient who presents for a Medicare Wellness Visit.    The following portions of the patient's history were reviewed and   updated as appropriate: allergies, current medications, past family history, past medical history, past social history, past surgical history, and problem list.    Compared to one year ago, the patient's physical   health is the same.  Compared to one year ago, the patient's mental   health is the same.    Recent Hospitalizations:  He was not admitted to the hospital during the last year.     Current Medical Providers:  Patient Care Team:  Barbi Carlson MD as PCP - General (Internal Medicine)    Outpatient Medications Prior to Visit   Medication Sig Dispense Refill    allopurinol (ZYLOPRIM) 100 MG tablet Take 1 tablet by mouth Daily. 90 tablet 3    doxazosin (CARDURA) 4 MG tablet Take 1 tablet by mouth Every Night. 90 tablet 3    fexofenadine (ALLEGRA) 180 MG tablet Take 1 tablet by mouth Daily.      folic acid (FOLVITE) 1 MG tablet Take 1 tablet by mouth Daily.      vitamin B-12 (CYANOCOBALAMIN) 500 MCG tablet Take 1 tablet by mouth Daily. 90 tablet 1    warfarin (COUMADIN) 4 MG tablet TAKE 1 TABLET BY MOUTH DAILY TOTAL DOSE OF 9MG 90 tablet 1    warfarin (COUMADIN) 5 MG tablet TAKE 1 TABLET BY MOUTH DAILY TOTAL DOSE OF 9MG 90 tablet 1    cholecalciferol (Vitamin D, Cholecalciferol,) 25 MCG (1000 UT) tablet Take 1 tablet by mouth Daily. 60 tablet 5    olmesartan (Benicar) 40 MG tablet Take 1 tablet by mouth Daily. 90 tablet 1    acetaminophen (TYLENOL) 650 MG 8 hr tablet Take 1 tablet by mouth Every 8 (Eight) Hours As Needed for Mild Pain.       No facility-administered medications prior to visit.     No opioid medication identified on active medication list. I have reviewed chart for other potential  high risk medication/s and harmful drug interactions in the elderly.      Aspirin  "is not on active medication list.  Aspirin use is not indicated based on review of current medical condition/s. Risk of harm outweighs potential benefits.  .    Patient Active Problem List   Diagnosis    Benign essential hypertension    Chronic atrial fibrillation    Allergic rhinitis    Primary insomnia    Other fatigue    Varicose veins of lower extremity    Bilateral hearing loss    Allergy to mold    Allergy to wheat    Vitamin D deficiency    Mixed hyperlipidemia    Anticoagulated on warfarin    Erectile dysfunction    Benign prostatic hyperplasia without urinary obstruction    Adverse effect of anticoagulant    Chronic right shoulder pain    Personal history of gout    Overweight with body mass index (BMI) of 29 to 29.9 in adult    Encounter for therapeutic drug monitoring    Annual physical exam    Medicare annual wellness visit, subsequent    Bradycardia    Mild anemia    Skin lesion of face    Therapeutic drug monitoring    Recurrent right inguinal hernia    Umbilical hernia without obstruction and without gangrene    S/P hernia repair     Advance Care Planning Advance Directive is on file.  ACP discussion was held with the patient during this visit. Patient has an advance directive in EMR which is still valid.             Objective   Vitals:    08/12/24 0838   BP: 120/58   BP Location: Left arm   Patient Position: Sitting   Cuff Size: Adult   Pulse: 72   Temp: 97.7 °F (36.5 °C)   SpO2: 100%   Weight: 98.9 kg (218 lb)   Height: 182.9 cm (72.01\")       Estimated body mass index is 29.56 kg/m² as calculated from the following:    Height as of this encounter: 182.9 cm (72.01\").    Weight as of this encounter: 98.9 kg (218 lb).            Does the patient have evidence of cognitive impairment? No  Lab Results   Component Value Date    TRIG 46 08/09/2024    HDL 77 (H) 08/09/2024     (H) 08/09/2024    VLDL 8 08/09/2024                                                                                          "       Health  Risk Assessment    Smoking Status:  Social History     Tobacco Use   Smoking Status Former    Current packs/day: 0.00    Average packs/day: 0.5 packs/day for 22.0 years (11.0 ttl pk-yrs)    Types: Cigarettes    Start date: 1966    Quit date: 1978    Years since quittin.6   Smokeless Tobacco Never   Tobacco Comments    no passive smoke exposure, quit 79     Alcohol Consumption:  Social History     Substance and Sexual Activity   Alcohol Use Yes    Alcohol/week: 2.0 standard drinks of alcohol    Types: 2 Glasses of wine per week    Comment: daily       Fall Risk Screen  STEADI Fall Risk Assessment was completed, and patient is at LOW risk for falls.Assessment completed on:2024    Depression Screenin/12/2024     8:37 AM   PHQ-2/PHQ-9 Depression Screening   Little Interest or Pleasure in Doing Things 0-->not at all   Feeling Down, Depressed or Hopeless 0-->not at all   PHQ-9: Brief Depression Severity Measure Score 0     Health Habits and Functional and Cognitive Screenin/12/2024     8:34 AM   Functional & Cognitive Status   Do you have difficulty preparing food and eating? No   Do you have difficulty bathing yourself, getting dressed or grooming yourself? No   Do you have difficulty using the toilet? No   Do you have difficulty moving around from place to place? No   Do you have trouble with steps or getting out of a bed or a chair? No   Current Diet Well Balanced Diet   Dental Exam Up to date   Eye Exam Up to date   Exercise (times per week) 5 times per week   Current Exercises Include Weightlifting;Other   Do you need help using the phone?  No   Are you deaf or do you have serious difficulty hearing?  No   Do you need help to go to places out of walking distance? No   Do you need help shopping? No   Do you need help preparing meals?  No   Do you need help with housework?  No   Do you need help with laundry? No   Do you need help taking your medications? No    Do you need help managing money? No   Do you ever drive or ride in a car without wearing a seat belt? No   Have you felt unusual stress, anger or loneliness in the last month? No   Who do you live with? Spouse   If you need help, do you have trouble finding someone available to you? No   Have you been bothered in the last four weeks by sexual problems? No   Do you have difficulty concentrating, remembering or making decisions? No           Age-appropriate Screening Schedule:  Refer to the list below for future screening recommendations based on patient's age, sex and/or medical conditions. Orders for these recommended tests are listed in the plan section. The patient has been provided with a written plan.    Health Maintenance List  Health Maintenance   Topic Date Due    HEPATITIS C SCREENING  Never done    INFLUENZA VACCINE  08/01/2024    ANNUAL WELLNESS VISIT  02/09/2025    BMI FOLLOWUP  05/24/2025    LIPID PANEL  08/09/2025    TDAP/TD VACCINES (4 - Td or Tdap) 01/17/2030    COVID-19 Vaccine  Completed    RSV Vaccine - Adults  Completed    Pneumococcal Vaccine 65+  Completed    ZOSTER VACCINE  Completed    COLORECTAL CANCER SCREENING  Discontinued                                                                                                                                                CMS Preventative Services Quick Reference  Risk Factors Identified During Encounter  Fall Risk-High or Moderate: Information on Fall Prevention Shared in After Visit Summary    The above risks/problems have been discussed with the patient.  Pertinent information has been shared with the patient in the After Visit Summary.  An After Visit Summary and PPPS were made available to the patient.         The patient presents for evaluation of multiple medical concerns.    He reports no recent falls. His exercise routine includes walking, golfing 4 to 5 times a week, and lifting weights. His knees are in good condition.    He denies  "experiencing any rapid heartbeats.    He is currently taking warfarin 9 mg daily without any signs of blood in the stool or gum bleeding.    He attributes his elevated LDL levels to increased carbohydrate intake during his recent trip to [facility].    He is currently taking vitamin D 1000 units daily.    He is scheduled to receive the influenza vaccine today.    He denies any prostate symptoms.    He is currently taking doxazosin at night and allopurinol daily.    He denies any symptoms of gout.    Supplemental Information  He has scheduled an appointment with a dermatologist in a couple of places he would like me to look at. He had one removed years ago.    IMMUNIZATIONS  He is up-to-date on his tetanus and RSV vaccines.          Objective   Vital Signs:  /58 (BP Location: Left arm, Patient Position: Sitting, Cuff Size: Adult)   Pulse 72   Temp 97.7 °F (36.5 °C)   Ht 182.9 cm (72.01\")   Wt 98.9 kg (218 lb)   SpO2 100%   BMI 29.56 kg/m²   Physical Exam  Vitals and nursing note reviewed.   Constitutional:       Appearance: He is well-developed and overweight.   HENT:      Head: Normocephalic.   Eyes:      Conjunctiva/sclera: Conjunctivae normal.      Pupils: Pupils are equal, round, and reactive to light.   Neck:      Thyroid: No thyromegaly.   Cardiovascular:      Rate and Rhythm: Normal rate and regular rhythm.      Heart sounds: Normal heart sounds.   Pulmonary:      Effort: Pulmonary effort is normal.      Breath sounds: Normal breath sounds. No wheezing.   Abdominal:      General: Bowel sounds are normal.      Palpations: Abdomen is soft.      Tenderness: There is no abdominal tenderness.   Musculoskeletal:         General: No tenderness. Normal range of motion.      Cervical back: Normal range of motion and neck supple.   Lymphadenopathy:      Cervical: No cervical adenopathy.   Skin:     General: Skin is warm and dry.      Findings: No rash.   Neurological:      Mental Status: He is alert and oriented " to person, place, and time.      Cranial Nerves: No cranial nerve deficit.      Sensory: No sensory deficit.      Coordination: Coordination normal.      Gait: Gait normal.   Psychiatric:         Attention and Perception: Attention normal.         Mood and Affect: Mood normal.         Speech: Speech normal.         Behavior: Behavior normal.         Thought Content: Thought content normal.         Cognition and Memory: Cognition normal.         Judgment: Judgment normal.           Vital Signs  Vitals show a blood pressure of 120/58.    The following data was reviewed by: Barbi Carlson MD on 08/12/2024:         Assessment and Plan Additional age appropriate preventative wellness advice topics were discussed during today's preventative wellness exam(some topics already addressed during AWV portion of the note above):    Physical Activity: Advised cardiovascular activity 150 minutes per week as tolerated. (example brisk walk for 30 minutes, 5 days a week).     Nutrition: Discussed nutrition plan with patient. Information shared in after visit summary. Goal is for a well balanced diet to enhance overall health.     Healthy Weight: Discussed current and goal BMI with patient. Steps to attain this goal discussed. Information shared in after visit summary.          1. Benign essential hypertension.  He will continue olmesartan daily. Continue to avoid salt in the diet. Continue regular exercise.    2. Mixed hyperlipidemia.  He will continue to improve low-fat, low-sugar diet. Eat small portions at mealtime. Try to lose about 5 pounds over the next 6 months.    3. Chronic atrial fibrillation.  He will continue warfarin 9 mg daily. Current INR is 1.66, but he just returned from traveling and his INR usually is very steady around 2 to 2.3. I will recheck PT/INR in 1 week.    4. Overweight.  He will continue to improve low fat, healthy diet. He will increase walking, golf, and weight workouts. Weigh at home once a week to  monitor progress.    5. Personal history of gout.  He will continue allopurinol daily.    6. Vitamin D deficiency.  He may stop taking his vitamin D3 supplement at this time since his level is up to 88. I will recheck it in 6 months.    Follow-up  The patient will follow up in 6 months.       Medicare annual wellness visit, subsequent    Annual physical exam    Chronic atrial fibrillation    Benign essential hypertension    Mixed hyperlipidemia    Overweight with body mass index (BMI) of 29 to 29.9 in adult  Patient's (Body mass index is 29.56 kg/m².) indicates that they are overweight with health conditions that include hypertension . Weight is unchanged. BMI is above average; BMI management plan is completed. We discussed low calorie, low carb based diet program, portion control, increasing exercise, and Information on healthy weight added to patient's after visit summary.   Personal history of gout    Vitamin D deficiency      Orders Placed This Encounter   Procedures    Comprehensive Metabolic Panel     Standing Status:   Future     Number of Occurrences:   1     Standing Expiration Date:   8/9/2025     Order Specific Question:   Release to patient     Answer:   Routine Release [[id number]]    Lipid Panel     Standing Status:   Future     Number of Occurrences:   1     Standing Expiration Date:   8/9/2025     Order Specific Question:   Release to patient     Answer:   Routine Release [[id number]]    Microalbumin / Creatinine Urine Ratio - Urine, Clean Catch     Standing Status:   Future     Number of Occurrences:   1     Standing Expiration Date:   8/9/2025     Order Specific Question:   Release to patient     Answer:   Routine Release [[id number]]    TSH     Standing Status:   Future     Number of Occurrences:   1     Standing Expiration Date:   8/9/2025     Order Specific Question:   Release to patient     Answer:   Routine Release [[id number]]    Vitamin D,25-Hydroxy     Standing Status:   Future     Number  of Occurrences:   1     Standing Expiration Date:   8/9/2025     Order Specific Question:   Release to patient     Answer:   Routine Release [[id number]]    CBC & Differential     Standing Status:   Future     Number of Occurrences:   1     Standing Expiration Date:   8/9/2025     Order Specific Question:   Manual Differential     Answer:   No     Order Specific Question:   Release to patient     Answer:   Routine Release [[id number]]    Urinalysis With Microscopic - Urine, Clean Catch     Standing Status:   Future     Number of Occurrences:   1     Standing Expiration Date:   8/9/2025     Order Specific Question:   Release to patient     Answer:   Routine Release [[id number]]     New Medications Ordered This Visit   Medications    olmesartan (Benicar) 40 MG tablet     Sig: Take 1 tablet by mouth Daily.     Dispense:  90 tablet     Refill:  1          Follow Up   No follow-ups on file.  Patient was given instructions and counseling regarding his condition or for health maintenance advice. Please see specific information pulled into the AVS if appropriate.  Patient or patient representative verbalized consent for the use of Ambient Listening during the visit with  Barbi Carlson MD for chart documentation. 8/12/2024  09:25 EDT

## 2024-08-12 NOTE — ASSESSMENT & PLAN NOTE
Patient's (Body mass index is 29.56 kg/m².) indicates that they are overweight with health conditions that include hypertension . Weight is unchanged. BMI is above average; BMI management plan is completed. We discussed low calorie, low carb based diet program, portion control, increasing exercise, and Information on healthy weight added to patient's after visit summary.

## 2024-08-12 NOTE — PATIENT INSTRUCTIONS
Problem List Items Addressed This Visit          Cardiac and Vasculature    Mixed hyperlipidemia (Chronic)    Overview     Lifestyle control.         Relevant Orders    CBC & Differential (Completed)    Comprehensive Metabolic Panel (Completed)    Lipid Panel (Completed)    TSH (Completed)    Benign essential hypertension    Overview     Taking olmesartan daily and doxazosin every evening.           Relevant Medications    doxazosin (CARDURA) 4 MG tablet    olmesartan (Benicar) 40 MG tablet    Other Relevant Orders    Microalbumin / Creatinine Urine Ratio - Urine, Clean Catch (Completed)    Urinalysis With Microscopic - Urine, Clean Catch (Completed)    Chronic atrial fibrillation    Overview     Anticoagulation with Coumadin and regular PT/INR checks.              Endocrine and Metabolic    Vitamin D deficiency (Chronic)    Relevant Orders    Vitamin D,25-Hydroxy (Completed)       Health Encounters    Annual physical exam    Medicare annual wellness visit, subsequent - Primary       Musculoskeletal and Injuries    Personal history of gout (Chronic)    Overview     Taking allopurinol 100mg daily.            Other    Overweight with body mass index (BMI) of 29 to 29.9 in adult (Chronic)    Current Assessment & Plan     Patient's (Body mass index is 29.56 kg/m².) indicates that they are overweight with health conditions that include hypertension . Weight is unchanged. BMI is above average; BMI management plan is completed. We discussed low calorie, low carb based diet program, portion control, increasing exercise, and Information on healthy weight added to patient's after visit summary.           Exercising to Stay Healthy  To become healthy and stay healthy, it is recommended that you do moderate-intensity and vigorous-intensity exercise. You can tell that you are exercising at a moderate intensity if your heart starts beating faster and you start breathing faster but can still hold a conversation. You can tell  that you are exercising at a vigorous intensity if you are breathing much harder and faster and cannot hold a conversation while exercising.  How can exercise benefit me?  Exercising regularly is important. It has many health benefits, such as:  Improving overall fitness, flexibility, and endurance.  Increasing bone density.  Helping with weight control.  Decreasing body fat.  Increasing muscle strength and endurance.  Reducing stress and tension, anxiety, depression, or anger.  Improving overall health.  What guidelines should I follow while exercising?  Before you start a new exercise program, talk with your health care provider.  Do not exercise so much that you hurt yourself, feel dizzy, or get very short of breath.  Wear comfortable clothes and wear shoes with good support.  Drink plenty of water while you exercise to prevent dehydration or heat stroke.  Work out until your breathing and your heartbeat get faster (moderate intensity).  How often should I exercise?  Choose an activity that you enjoy, and set realistic goals. Your health care provider can help you make an activity plan that is individually designed and works best for you.  Exercise regularly as told by your health care provider. This may include:  Doing strength training two times a week, such as:  Lifting weights.  Using resistance bands.  Push-ups.  Sit-ups.  Yoga.  Doing a certain intensity of exercise for a given amount of time. Choose from these options:  A total of 150 minutes of moderate-intensity exercise every week.  A total of 75 minutes of vigorous-intensity exercise every week.  A mix of moderate-intensity and vigorous-intensity exercise every week.  Children, pregnant women, people who have not exercised regularly, people who are overweight, and older adults may need to talk with a health care provider about what activities are safe to perform. If you have a medical condition, be sure to talk with your health care provider before you  start a new exercise program.  What are some exercise ideas?  Moderate-intensity exercise ideas include:  Walking 1 mile (1.6 km) in about 15 minutes.  Biking.  Hiking.  Golfing.  Dancing.  Water aerobics.  Vigorous-intensity exercise ideas include:  Walking 4.5 miles (7.2 km) or more in about 1 hour.  Jogging or running 5 miles (8 km) in about 1 hour.  Biking 10 miles (16.1 km) or more in about 1 hour.  Lap swimming.  Roller-skating or in-line skating.  Cross-country skiing.  Vigorous competitive sports, such as football, basketball, and soccer.  Jumping rope.  Aerobic dancing.  What are some everyday activities that can help me get exercise?  Yard work, such as:  Pushing a .  Raking and bagging leaves.  Washing your car.  Pushing a stroller.  Shoveling snow.  Gardening.  Washing windows or floors.  How can I be more active in my day-to-day activities?  Use stairs instead of an elevator.  Take a walk during your lunch break.  If you drive, park your car farther away from your work or school.  If you take public transportation, get off one stop early and walk the rest of the way.  Stand up or walk around during all of your indoor phone calls.  Get up, stretch, and walk around every 30 minutes throughout the day.  Enjoy exercise with a friend. Support to continue exercising will help you keep a regular routine of activity.  Where to find more information  You can find more information about exercising to stay healthy from:  [place] Department of Health and Human Services: www.hhs.gov  Centers for Disease Control and Prevention (CDC): www.cdc.gov  Summary  Exercising regularly is important. It will improve your overall fitness, flexibility, and endurance.  Regular exercise will also improve your overall health. It can help you control your weight, reduce stress, and improve your bone density.  Do not exercise so much that you hurt yourself, feel dizzy, or get very short of breath.  Before you start a new  "exercise program, talk with your health care provider.  This information is not intended to replace advice given to you by your health care provider. Make sure you discuss any questions you have with your health care provider.  Document Revised: 04/15/2022 Document Reviewed: 04/15/2022  [employer] Patient Education © 2023 [employer] BMI for Adults  Body mass index (BMI) is a number found using a person's weight and height. BMI can help tell how much of a person's weight is made up of fat. BMI does not measure body fat directly. It is used instead of tests that directly measure body fat, which can be difficult and expensive.  What are BMI measurements used for?  BMI is useful to:  Find out if your weight puts you at higher risk for medical problems.  Help recommend changes, such as in diet and exercise. This can help you reach a healthy weight. BMI screening can be done again to see if these changes are working.  How is BMI calculated?  Your height and weight are measured. The BMI is found from those numbers. This can be done with [place] or metric measurements. Note that charts and online BMI calculators are available to help you find your BMI quickly and easily without doing these calculations.  To calculate your BMI in [place] measurements:  Measure your weight in pounds (lb).  Multiply the number of pounds by 703.  So, for an adult who weighs 150 lb, multiply that number by 703: 150 x 703, which equals 105,450.  Measure your height in inches. Then multiply that number by itself to get a measurement called \"inches squared.\"  So, for an adult who is 70 inches tall, the \"inches squared\" measurement is 70 inches x 70 inches, which equals 4,900 inches squared.  Divide the total from step 2 (number of lb x 703) by the total from step 3 (inches squared): 105,450 ÷ 4,900 = 21.5. This is your BMI.  To calculate your BMI in metric measurements:    Measure your weight in kilograms (kg).  For this example, the weight is 70 " "kg.  Measure your height in meters (m). Then multiply that number by itself to get a measurement called \"meters squared.\"  So, for an adult who is 1.75 m tall, the \"meters squared\" measurement is 1.75 m x 1.75 m, which equals 3.1 meters squared.  Divide the number of kilograms (your weight) by the meters squared number. In this example: 70 ÷ 3.1 = 22.6. This is your BMI.  What do the results mean?  BMI charts are used to see if you are underweight, normal weight, overweight, or obese. The following guidelines will be used:  Underweight: BMI less than 18.5.  Normal weight: BMI between 18.5 and 24.9.  Overweight: BMI between 25 and 29.9.  Obese: BMI of 30 or above.  BMI is a tool and cannot diagnose a condition. Talk with your health care provider about what your BMI means for you. Keep these notes in mind:  Weight includes fat and muscle. Someone with a muscular build, such as an athlete, may have a BMI that is higher than 24.9. In cases like these, BMI is not a correct measure of body fat.  If you have a BMI of 25 or higher, your provider may need to do more testing to find out if excess body fat is the cause.  BMI is measured the same way for males and females. Females usually have more body fat than males of the same height and weight.  Where to find more information  For more information about BMI, including tools to quickly find your BMI, go to:  Centers for Disease Control and Prevention: cdc.gov  American Heart Association: heart.org  National Heart, Lung, and [employer]: nhlbi.nih.gov  This information is not intended to replace advice given to you by your health care provider. Make sure you discuss any questions you have with your health care provider.  Document Revised: 09/07/2023 Document Reviewed: 08/31/2023  [employer] Patient Education © 2024 [employer]  Fall Prevention in the Home, Adult  Falls can cause injuries and affect people of all ages. There are many simple things that you can do to make " your home safe and to help prevent falls.  If you need it, ask for help making these changes.  What actions can I take to prevent falls?  General information  Use good lighting in all rooms. Make sure to:  Replace any light bulbs that burn out.  Turn on lights if it is dark and use night-lights.  Keep items that you use often in easy-to-reach places. Lower the shelves around your home if needed.  Move furniture so that there are clear paths around it.  Do not keep throw rugs or other things on the floor that can make you trip.  If any of your floors are uneven, fix them.  Add color or contrast paint or tape to clearly [place] and help you see:  Grab bars or handrails.  First and last steps of staircases.  Where the edge of each step is.  If you use a ladder or stepladder:  Make sure that it is fully opened. Do not climb a closed ladder.  Make sure the sides of the ladder are locked in place.  Have someone hold the ladder while you use it.  Know where your pets are as you move through your home.  What can I do in the bathroom?         Keep the floor dry. Clean up any water that is on the floor right away.  Remove soap buildup in the bathtub or shower. Buildup makes bathtubs and showers slippery.  Use non-skid mats or decals on the floor of the bathtub or shower.  Attach bath mats securely with double-sided, non-slip rug tape.  If you need to sit down while you are in the shower, use a non-slip stool.  Install grab bars by the toilet and in the bathtub and shower. Do not use towel bars as grab bars.  What can I do in the bedroom?  Make sure that you have a light by your bed that is easy to reach.  Do not use any sheets or blankets on your bed that hang to the floor.  Have a firm bench or chair with side arms that you can use for support when you get dressed.  What can I do in the kitchen?  Clean up any spills right away.  If you need to reach something above you, use a sturdy step stool that has a grab bar.  Keep  electrical cables out of the way.  Do not use floor polish or wax that makes floors slippery.  What can I do with my stairs?  Do not leave anything on the stairs.  Make sure that you have a light switch at the top and the bottom of the stairs. Have them installed if you do not have them.  Make sure that there are handrails on both sides of the stairs. Fix handrails that are broken or loose. Make sure that handrails are as long as the staircases.  Install non-slip stair treads on all stairs in your home if they do not have carpet.  Avoid having throw rugs at the top or bottom of stairs, or secure the rugs with carpet tape to prevent them from moving.  Choose a carpet design that does not hide the edge of steps on the stairs. Make sure that carpet is firmly attached to the stairs. Fix any carpet that is loose or worn.  What can I do on the outside of my home?  Use bright outdoor lighting.  Repair the edges of walkways and driveways and fix any cracks. Clear paths of anything that can make you trip, such as tools or rocks.  Add color or contrast paint or tape to clearly [place] and help you see high doorway thresholds.  Trim any bushes or trees on the main path into your home.  Check that handrails are securely fastened and in good repair. Both sides of all steps should have handrails.  Install guardrails along the edges of any raised decks or porches.  Have leaves, snow, and ice cleared regularly. Use sand, salt, or ice melt on walkways during winter months if you live where there is ice and snow.  In the garage, clean up any spills right away, including grease or oil spills.  What other actions can I take?  Review your medicines with your health care provider. Some medicines can make you confused or feel dizzy. This can increase your chance of falling.  Wear closed-toe shoes that fit well and support your feet. Wear shoes that have rubber soles and low heels.  Use a cane, walker, scooter, or crutches that help you move  around if needed.  Talk with your provider about other ways that you can decrease your risk of falls. This may include seeing a physical therapist to learn to do exercises to improve movement and strength.  Where to find more information  Centers for Disease Control and PreventionJONNY: cdc.gov  National [place] on Aging: [place].nih.gov  National [place] on Aging: [place].nih.gov  Contact a health care provider if:  You are afraid of falling at home.  You feel weak, drowsy, or dizzy at home.  You fall at home.  Get help right away if you:  Lose consciousness or have trouble moving after a fall.  Have a fall that causes a head injury.  These symptoms may be an emergency. Get help right away. Call 911.  Do not wait to see if the symptoms will go away.  Do not drive yourself to the hospital.  This information is not intended to replace advice given to you by your health care provider. Make sure you discuss any questions you have with your health care provider.  Document Revised: 08/21/2023 Document Reviewed: 08/21/2023  [employer]vier Patient Education © 2024 [employer]

## 2024-08-16 ENCOUNTER — LAB (OUTPATIENT)
Dept: LAB | Facility: HOSPITAL | Age: 79
End: 2024-08-16
Payer: MEDICARE

## 2024-08-16 DIAGNOSIS — Z51.81 THERAPEUTIC DRUG MONITORING: Primary | ICD-10-CM

## 2024-08-16 DIAGNOSIS — Z51.81 THERAPEUTIC DRUG MONITORING: ICD-10-CM

## 2024-08-16 DIAGNOSIS — Z79.01 ANTICOAGULATED ON WARFARIN: ICD-10-CM

## 2024-08-16 DIAGNOSIS — Z79.01 ANTICOAGULATED ON WARFARIN: Primary | ICD-10-CM

## 2024-08-16 LAB
INR PPP: 2.08 (ref 0.89–1.12)
PROTHROMBIN TIME: 23.4 SECONDS (ref 12.2–14.5)

## 2024-08-16 PROCEDURE — 36415 COLL VENOUS BLD VENIPUNCTURE: CPT

## 2024-08-16 PROCEDURE — 85610 PROTHROMBIN TIME: CPT

## 2024-09-19 ENCOUNTER — LAB (OUTPATIENT)
Dept: LAB | Facility: HOSPITAL | Age: 79
End: 2024-09-19
Payer: MEDICARE

## 2024-09-19 DIAGNOSIS — Z79.01 ANTICOAGULATED ON WARFARIN: ICD-10-CM

## 2024-09-19 DIAGNOSIS — Z51.81 THERAPEUTIC DRUG MONITORING: ICD-10-CM

## 2024-09-19 LAB
INR PPP: 2.34 (ref 0.89–1.12)
PROTHROMBIN TIME: 25.7 SECONDS (ref 12.2–14.5)

## 2024-09-19 PROCEDURE — 85610 PROTHROMBIN TIME: CPT

## 2024-09-19 PROCEDURE — 36415 COLL VENOUS BLD VENIPUNCTURE: CPT

## 2024-10-16 NOTE — ASSESSMENT & PLAN NOTE
Continue allopurinol daily. Continue low fat diet.   Medication was to be used short term. Referral to allergy was placed at last office visit.

## 2024-10-17 ENCOUNTER — LAB (OUTPATIENT)
Dept: LAB | Facility: HOSPITAL | Age: 79
End: 2024-10-17
Payer: MEDICARE

## 2024-10-17 DIAGNOSIS — Z79.01 ANTICOAGULATED ON WARFARIN: ICD-10-CM

## 2024-10-17 DIAGNOSIS — Z51.81 THERAPEUTIC DRUG MONITORING: Primary | ICD-10-CM

## 2024-10-17 LAB
INR PPP: 2.09 (ref 0.89–1.12)
PROTHROMBIN TIME: 23.6 SECONDS (ref 12.2–14.5)

## 2024-10-17 PROCEDURE — 85610 PROTHROMBIN TIME: CPT

## 2024-10-17 PROCEDURE — 36415 COLL VENOUS BLD VENIPUNCTURE: CPT

## 2024-11-14 ENCOUNTER — LAB (OUTPATIENT)
Dept: LAB | Facility: HOSPITAL | Age: 79
End: 2024-11-14
Payer: MEDICARE

## 2024-11-14 DIAGNOSIS — Z79.01 ANTICOAGULATED ON WARFARIN: ICD-10-CM

## 2024-11-14 DIAGNOSIS — Z51.81 THERAPEUTIC DRUG MONITORING: Primary | ICD-10-CM

## 2024-11-14 LAB
INR PPP: 2.36 (ref 0.89–1.12)
PROTHROMBIN TIME: 25.9 SECONDS (ref 12.2–14.5)

## 2024-11-14 PROCEDURE — 85610 PROTHROMBIN TIME: CPT

## 2024-11-14 PROCEDURE — 36415 COLL VENOUS BLD VENIPUNCTURE: CPT

## 2024-11-18 RX ORDER — WARFARIN SODIUM 4 MG/1
TABLET ORAL
Qty: 90 TABLET | Refills: 1 | Status: SHIPPED | OUTPATIENT
Start: 2024-11-18

## 2024-11-18 NOTE — TELEPHONE ENCOUNTER
Rx Refill Note  Requested Prescriptions     Pending Prescriptions Disp Refills    warfarin (COUMADIN) 4 MG tablet [Pharmacy Med Name: WARFARIN SODIUM 4 MG TABLET] 90 tablet 1     Sig: TAKE 1 TABLET BY MOUTH DAILY FOR A TOTAL DOSE OF 9MG      Last office visit with prescribing clinician: 8/12/2024   Last telemedicine visit with prescribing clinician: Visit date not found   Next office visit with prescribing clinician: 2/7/2025                         Would you like a call back once the refill request has been completed: [] Yes [] No    If the office needs to give you a call back, can they leave a voicemail: [] Yes [] No    Michaelle Xiong LPN  11/18/24, 08:10 EST

## 2024-12-06 RX ORDER — WARFARIN SODIUM 5 MG/1
TABLET ORAL
Qty: 90 TABLET | Refills: 1 | Status: SHIPPED | OUTPATIENT
Start: 2024-12-06

## 2024-12-06 NOTE — TELEPHONE ENCOUNTER
Rx Refill Note  Requested Prescriptions     Pending Prescriptions Disp Refills    warfarin (COUMADIN) 5 MG tablet [Pharmacy Med Name: WARFARIN SODIUM 5 MG TABLET] 90 tablet 1     Sig: TAKE 1 TABLET BY MOUTH DAILY FOR A TOTAL DOSE OF 9MG      Last office visit with prescribing clinician: 8/12/2024   Last telemedicine visit with prescribing clinician: Visit date not found   Next office visit with prescribing clinician: 2/7/2025                         Would you like a call back once the refill request has been completed: [] Yes [] No    If the office needs to give you a call back, can they leave a voicemail: [] Yes [] No    Michaelle Xiong LPN  12/06/24, 07:46 EST

## 2024-12-13 ENCOUNTER — LAB (OUTPATIENT)
Dept: LAB | Facility: HOSPITAL | Age: 79
End: 2024-12-13
Payer: MEDICARE

## 2024-12-13 DIAGNOSIS — Z79.01 ANTICOAGULATED ON WARFARIN: ICD-10-CM

## 2024-12-13 DIAGNOSIS — Z51.81 THERAPEUTIC DRUG MONITORING: Primary | ICD-10-CM

## 2024-12-13 LAB
INR PPP: 2.1 (ref 0.89–1.12)
PROTHROMBIN TIME: 23.6 SECONDS (ref 12.2–14.5)

## 2024-12-13 PROCEDURE — 85610 PROTHROMBIN TIME: CPT

## 2024-12-13 PROCEDURE — 93793 ANTICOAG MGMT PT WARFARIN: CPT | Performed by: INTERNAL MEDICINE

## 2024-12-13 PROCEDURE — 36415 COLL VENOUS BLD VENIPUNCTURE: CPT

## 2025-01-09 ENCOUNTER — LAB (OUTPATIENT)
Dept: LAB | Facility: HOSPITAL | Age: 80
End: 2025-01-09
Payer: MEDICARE

## 2025-01-09 DIAGNOSIS — Z79.01 ANTICOAGULATED ON WARFARIN: ICD-10-CM

## 2025-01-09 DIAGNOSIS — Z51.81 THERAPEUTIC DRUG MONITORING: Primary | ICD-10-CM

## 2025-01-09 LAB
INR PPP: 1.91 (ref 0.89–1.12)
PROTHROMBIN TIME: 22 SECONDS (ref 12.2–14.5)

## 2025-01-09 PROCEDURE — 36415 COLL VENOUS BLD VENIPUNCTURE: CPT

## 2025-01-09 PROCEDURE — 93793 ANTICOAG MGMT PT WARFARIN: CPT | Performed by: INTERNAL MEDICINE

## 2025-01-09 PROCEDURE — 85610 PROTHROMBIN TIME: CPT

## 2025-01-30 ENCOUNTER — PATIENT MESSAGE (OUTPATIENT)
Dept: INTERNAL MEDICINE | Facility: CLINIC | Age: 80
End: 2025-01-30
Payer: MEDICARE

## 2025-01-30 DIAGNOSIS — I10 BENIGN ESSENTIAL HYPERTENSION: ICD-10-CM

## 2025-01-30 RX ORDER — OLMESARTAN MEDOXOMIL 40 MG/1
40 TABLET ORAL DAILY
Qty: 90 TABLET | Refills: 1 | Status: SHIPPED | OUTPATIENT
Start: 2025-01-30

## 2025-01-30 NOTE — TELEPHONE ENCOUNTER
Physician confirms case reviewed for anesthesia consultation requirements. Rx Refill Note  Requested Prescriptions     Pending Prescriptions Disp Refills    olmesartan (Benicar) 40 MG tablet 90 tablet 1     Sig: Take 1 tablet by mouth Daily.      Last office visit with prescribing clinician: 8/12/2024   Last telemedicine visit with prescribing clinician: Visit date not found   Next office visit with prescribing clinician: 2/7/2025                         Would you like a call back once the refill request has been completed: [] Yes [] No    If the office needs to give you a call back, can they leave a voicemail: [] Yes [] No    Leatha Massey LPN  01/30/25, 08:34 EST

## 2025-02-04 ENCOUNTER — LAB (OUTPATIENT)
Dept: LAB | Facility: HOSPITAL | Age: 80
End: 2025-02-04
Payer: MEDICARE

## 2025-02-04 DIAGNOSIS — Z79.01 ANTICOAGULATED ON WARFARIN: ICD-10-CM

## 2025-02-04 DIAGNOSIS — I10 BENIGN ESSENTIAL HYPERTENSION: ICD-10-CM

## 2025-02-04 DIAGNOSIS — Z51.81 THERAPEUTIC DRUG MONITORING: ICD-10-CM

## 2025-02-04 DIAGNOSIS — E78.2 MIXED HYPERLIPIDEMIA: Chronic | ICD-10-CM

## 2025-02-04 DIAGNOSIS — Z87.39 PERSONAL HISTORY OF GOUT: Chronic | ICD-10-CM

## 2025-02-04 LAB
ALBUMIN SERPL-MCNC: 4.1 G/DL (ref 3.5–5.2)
ALBUMIN/GLOB SERPL: 1.8 G/DL
ALP SERPL-CCNC: 61 U/L (ref 39–117)
ALT SERPL W P-5'-P-CCNC: 17 U/L (ref 1–41)
ANION GAP SERPL CALCULATED.3IONS-SCNC: 9.7 MMOL/L (ref 5–15)
AST SERPL-CCNC: 26 U/L (ref 1–40)
BASOPHILS # BLD AUTO: 0.06 10*3/MM3 (ref 0–0.2)
BASOPHILS NFR BLD AUTO: 1 % (ref 0–1.5)
BILIRUB SERPL-MCNC: 0.3 MG/DL (ref 0–1.2)
BUN SERPL-MCNC: 38 MG/DL (ref 8–23)
BUN/CREAT SERPL: 29.7 (ref 7–25)
CALCIUM SPEC-SCNC: 9.5 MG/DL (ref 8.6–10.5)
CHLORIDE SERPL-SCNC: 108 MMOL/L (ref 98–107)
CHOLEST SERPL-MCNC: 183 MG/DL (ref 0–200)
CO2 SERPL-SCNC: 24.3 MMOL/L (ref 22–29)
CREAT SERPL-MCNC: 1.28 MG/DL (ref 0.76–1.27)
DEPRECATED RDW RBC AUTO: 48.4 FL (ref 37–54)
EGFRCR SERPLBLD CKD-EPI 2021: 57.3 ML/MIN/1.73
EOSINOPHIL # BLD AUTO: 0.37 10*3/MM3 (ref 0–0.4)
EOSINOPHIL NFR BLD AUTO: 5.9 % (ref 0.3–6.2)
ERYTHROCYTE [DISTWIDTH] IN BLOOD BY AUTOMATED COUNT: 13.5 % (ref 12.3–15.4)
GLOBULIN UR ELPH-MCNC: 2.3 GM/DL
GLUCOSE SERPL-MCNC: 96 MG/DL (ref 65–99)
HCT VFR BLD AUTO: 35.2 % (ref 37.5–51)
HDLC SERPL-MCNC: 78 MG/DL (ref 40–60)
HGB BLD-MCNC: 12 G/DL (ref 13–17.7)
IMM GRANULOCYTES # BLD AUTO: 0.02 10*3/MM3 (ref 0–0.05)
IMM GRANULOCYTES NFR BLD AUTO: 0.3 % (ref 0–0.5)
INR PPP: 2.79 (ref 0.89–1.12)
LDLC SERPL CALC-MCNC: 97 MG/DL (ref 0–100)
LDLC/HDLC SERPL: 1.25 {RATIO}
LYMPHOCYTES # BLD AUTO: 3.44 10*3/MM3 (ref 0.7–3.1)
LYMPHOCYTES NFR BLD AUTO: 54.7 % (ref 19.6–45.3)
MCH RBC QN AUTO: 33.7 PG (ref 26.6–33)
MCHC RBC AUTO-ENTMCNC: 34.1 G/DL (ref 31.5–35.7)
MCV RBC AUTO: 98.9 FL (ref 79–97)
MONOCYTES # BLD AUTO: 0.33 10*3/MM3 (ref 0.1–0.9)
MONOCYTES NFR BLD AUTO: 5.2 % (ref 5–12)
NEUTROPHILS NFR BLD AUTO: 2.07 10*3/MM3 (ref 1.7–7)
NEUTROPHILS NFR BLD AUTO: 32.9 % (ref 42.7–76)
NRBC BLD AUTO-RTO: 0.3 /100 WBC (ref 0–0.2)
PLATELET # BLD AUTO: 152 10*3/MM3 (ref 140–450)
PMV BLD AUTO: 11.1 FL (ref 6–12)
POTASSIUM SERPL-SCNC: 4.5 MMOL/L (ref 3.5–5.2)
PROT SERPL-MCNC: 6.4 G/DL (ref 6–8.5)
PROTHROMBIN TIME: 29.4 SECONDS (ref 12.2–14.5)
RBC # BLD AUTO: 3.56 10*6/MM3 (ref 4.14–5.8)
SODIUM SERPL-SCNC: 142 MMOL/L (ref 136–145)
TRIGL SERPL-MCNC: 38 MG/DL (ref 0–150)
TSH SERPL DL<=0.05 MIU/L-ACNC: 1.83 UIU/ML (ref 0.27–4.2)
URATE SERPL-MCNC: 6.9 MG/DL (ref 3.4–7)
VIT B12 BLD-MCNC: 715 PG/ML (ref 211–946)
VLDLC SERPL-MCNC: 8 MG/DL (ref 5–40)
WBC NRBC COR # BLD AUTO: 6.29 10*3/MM3 (ref 3.4–10.8)

## 2025-02-04 PROCEDURE — 85025 COMPLETE CBC W/AUTO DIFF WBC: CPT

## 2025-02-04 PROCEDURE — 80053 COMPREHEN METABOLIC PANEL: CPT

## 2025-02-04 PROCEDURE — 80061 LIPID PANEL: CPT

## 2025-02-04 PROCEDURE — 84443 ASSAY THYROID STIM HORMONE: CPT

## 2025-02-04 PROCEDURE — 84550 ASSAY OF BLOOD/URIC ACID: CPT

## 2025-02-04 PROCEDURE — 85610 PROTHROMBIN TIME: CPT

## 2025-02-04 PROCEDURE — 82607 VITAMIN B-12: CPT

## 2025-02-04 PROCEDURE — 36415 COLL VENOUS BLD VENIPUNCTURE: CPT

## 2025-02-04 PROCEDURE — 82570 ASSAY OF URINE CREATININE: CPT

## 2025-02-04 PROCEDURE — 82043 UR ALBUMIN QUANTITATIVE: CPT

## 2025-02-04 PROCEDURE — 81001 URINALYSIS AUTO W/SCOPE: CPT

## 2025-02-05 LAB
ALBUMIN UR-MCNC: <1.2 MG/DL
BACTERIA UR QL AUTO: NORMAL /HPF
BILIRUB UR QL STRIP: NEGATIVE
CLARITY UR: CLEAR
COLOR UR: YELLOW
CREAT UR-MCNC: 94.3 MG/DL
GLUCOSE UR STRIP-MCNC: NEGATIVE MG/DL
HGB UR QL STRIP.AUTO: NEGATIVE
HYALINE CASTS UR QL AUTO: NORMAL /LPF
KETONES UR QL STRIP: NEGATIVE
LEUKOCYTE ESTERASE UR QL STRIP.AUTO: NEGATIVE
MICROALBUMIN/CREAT UR: NORMAL MG/G{CREAT}
NITRITE UR QL STRIP: NEGATIVE
PH UR STRIP.AUTO: 6 [PH] (ref 5–8)
PROT UR QL STRIP: NEGATIVE
RBC # UR STRIP: NORMAL /HPF
REF LAB TEST METHOD: NORMAL
SP GR UR STRIP: 1.02 (ref 1–1.03)
SQUAMOUS #/AREA URNS HPF: NORMAL /HPF
UROBILINOGEN UR QL STRIP: NORMAL
WBC # UR STRIP: NORMAL /HPF

## 2025-02-07 ENCOUNTER — OFFICE VISIT (OUTPATIENT)
Dept: INTERNAL MEDICINE | Facility: CLINIC | Age: 80
End: 2025-02-07
Payer: MEDICARE

## 2025-02-07 VITALS
DIASTOLIC BLOOD PRESSURE: 72 MMHG | WEIGHT: 230.8 LBS | TEMPERATURE: 98 F | OXYGEN SATURATION: 95 % | HEIGHT: 72 IN | SYSTOLIC BLOOD PRESSURE: 128 MMHG | HEART RATE: 54 BPM | BODY MASS INDEX: 31.26 KG/M2

## 2025-02-07 DIAGNOSIS — Z79.01 ANTICOAGULATED ON WARFARIN: ICD-10-CM

## 2025-02-07 DIAGNOSIS — Z87.39 PERSONAL HISTORY OF GOUT: Chronic | ICD-10-CM

## 2025-02-07 DIAGNOSIS — G89.29 CHRONIC PAIN OF BOTH KNEES: Primary | Chronic | ICD-10-CM

## 2025-02-07 DIAGNOSIS — I48.20 CHRONIC ATRIAL FIBRILLATION: ICD-10-CM

## 2025-02-07 DIAGNOSIS — E66.3 OVERWEIGHT WITH BODY MASS INDEX (BMI) OF 29 TO 29.9 IN ADULT: Chronic | ICD-10-CM

## 2025-02-07 DIAGNOSIS — I10 BENIGN ESSENTIAL HYPERTENSION: ICD-10-CM

## 2025-02-07 DIAGNOSIS — D64.9 MILD ANEMIA: ICD-10-CM

## 2025-02-07 DIAGNOSIS — M25.561 CHRONIC PAIN OF BOTH KNEES: Primary | Chronic | ICD-10-CM

## 2025-02-07 DIAGNOSIS — E78.2 MIXED HYPERLIPIDEMIA: Chronic | ICD-10-CM

## 2025-02-07 DIAGNOSIS — M25.562 CHRONIC PAIN OF BOTH KNEES: Primary | Chronic | ICD-10-CM

## 2025-02-07 PROCEDURE — 3074F SYST BP LT 130 MM HG: CPT | Performed by: INTERNAL MEDICINE

## 2025-02-07 PROCEDURE — 1159F MED LIST DOCD IN RCRD: CPT | Performed by: INTERNAL MEDICINE

## 2025-02-07 PROCEDURE — G2211 COMPLEX E/M VISIT ADD ON: HCPCS | Performed by: INTERNAL MEDICINE

## 2025-02-07 PROCEDURE — 99214 OFFICE O/P EST MOD 30 MIN: CPT | Performed by: INTERNAL MEDICINE

## 2025-02-07 PROCEDURE — 1126F AMNT PAIN NOTED NONE PRSNT: CPT | Performed by: INTERNAL MEDICINE

## 2025-02-07 PROCEDURE — 3078F DIAST BP <80 MM HG: CPT | Performed by: INTERNAL MEDICINE

## 2025-02-07 PROCEDURE — 1160F RVW MEDS BY RX/DR IN RCRD: CPT | Performed by: INTERNAL MEDICINE

## 2025-02-07 RX ORDER — DOXAZOSIN 4 MG/1
4 TABLET ORAL NIGHTLY
Qty: 90 TABLET | Refills: 3 | Status: SHIPPED | OUTPATIENT
Start: 2025-02-07

## 2025-02-07 RX ORDER — OLMESARTAN MEDOXOMIL 40 MG/1
40 TABLET ORAL DAILY
Qty: 90 TABLET | Refills: 1 | Status: SHIPPED | OUTPATIENT
Start: 2025-02-07

## 2025-02-07 NOTE — PROGRESS NOTES
[facility]     Uriel Valverde  1945   [id number]      Patient Care Team:  Barbi Carlson MD as PCP - General (Internal Medicine)    Chief Complaint   Patient presents with    6 mo f/u    Hypertension    Allergies        Patient is a 79 y.o. male.   History of Present Illness  The patient is a 79-year-old male who presents for an office visit.    He reports experiencing knee stiffness after walking 18 holes of golf, which he engages in twice weekly. This stiffness persists for a couple of days, during which he finds ascending stairs particularly challenging. However, he does not experience any pain or swelling in the knees. He also reports no difficulty in descending stairs, no sensation of instability in the knee, and no limping. He maintains his balance well and has not experienced any side effects from his medications. He recalls a past meniscus tear in one knee and expresses reluctance towards surgical intervention. He has not been engaging in short walks this winter, with most of his physical activity being stair climbing due to his office location on the second floor. He has not sought any treatment for his knee symptoms, including the use of cold packs.    He monitors his blood pressure at home, noting that it tends to be slightly elevated in the morning and normalizes by afternoon. He takes doxazosin at night and olmesartan in the morning, without experiencing any lightheadedness or other side effects.    He consumes approximately 2 quarts of water daily and limits his intake of diet soda to once or twice weekly during golf sessions. He has been managing his carbohydrate intake effectively. He experiences heartburn or indigestion approximately twice monthly. He reports no presence of blood in his stool or any changes in stool color.    He typically consumes red meat twice weekly and has not experienced any recent gout flare-ups.    MEDICATIONS  Current: doxazosin, olmesartan, B12,  folic acid, Allegra, allopurinol, warfarin         CHRONIC CONDITIONS      Past Medical History:   Diagnosis Date    A-fib 02/11/2004    chronic coumadin; failed cardioversion; normal echo and exercise nuclear stress test.  He opted not to have ablation.    A-fib     failed cardioversion, normal echo and exercise nuclear stress test.  He opted not to have ablation    Abnormal CT scan, lung 02/09/2017    RLL opacity with air bronchograms     Allergic Soy    Wheat    Allergic rhinitis     Allergy to wheat     as well as soybean    Anemia     BPH (benign prostatic hyperplasia)     Cobalamin deficiency 11/15/2016    Cough 2015    sec/to inflammatory pnuemonitis    Elbow effusion, left 09/2015    presumed sec/to gout    Gout 2015    right ankle and righ knee (also of right hand-remote); colchicine prn.Uric acid lowering with allopurinol    H/O eye surgery 2003    Hayfever     as child; resolved when family moved to [place] from [place]    Hypertension     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 Rod Whitten    Macrocytic anemia 05/09/2016    Medial meniscus tear 2003    right; arthroscopy knee    Mild anemia 07/22/2022    Sensitivity to sunlight     sun sensitivity rash; sun avoidance, for many years    Shoulder fracture, right     childhood    Spongiotic dermatitis     Tonsillitis     childhood; Tonsillectomy    Vitamin B12 deficiency     Vitamin D deficiency 09/08/2010       Past Surgical History:   Procedure Laterality Date    BASAL CELL CARCINOMA EXCISION Left 2024    L face    COLONOSCOPY      EYE SURGERY  2/1/2023    Cataract Removal - both    HERNIA REPAIR      INGUINAL HERNIA REPAIR  1995    INGUINAL HERNIA REPAIR Right  "2024    Procedure: RECURRENT RIGHT INGUINAL HERNIA REPAIR WITH MESH;  Surgeon: Lance Boykin MD;  Location:  [facility];  Service: General;  Laterality: Right;    KNEE ARTHROSCOPY Right     due to meniscus tear    TONSILLECTOMY      as a child    UMBILICAL HERNIA REPAIR N/A 2024    Procedure: UMBILICAL HERNIA REPAIR WITH MESH;  Surgeon: Lance Boykin MD;  Location:  [facility];  Service: General;  Laterality: N/A;    VASECTOMY         Family History   Problem Relation Age of Onset    Heart failure Father     Coronary artery disease Father         COD    Stroke Sister 63    Cancer Sister         Lung Cancer    Hearing loss Brother         Nicolle       Social History     Socioeconomic History    Marital status:    Tobacco Use    Smoking status: Former     Current packs/day: 0.00     Average packs/day: 0.5 packs/day for 22.0 years (11.0 ttl pk-yrs)     Types: Cigarettes     Start date: 1966     Quit date: 1978     Years since quittin.1    Smokeless tobacco: Never    Tobacco comments:     no passive smoke exposure, quit 79   Vaping Use    Vaping status: Never Used   Substance and Sexual Activity    Alcohol use: Yes     Alcohol/week: 2.0 standard drinks of alcohol     Types: 2 Glasses of wine per week     Comment: daily    Drug use: No    Sexual activity: Not Currently     Birth control/protection: Vasectomy       Allergies   Allergen Reactions    Other Rash     Soy, mold, and wheat       Review of Systems:     Review of Systems    Vital Signs  Vitals:    25 1537   BP: 128/72   BP Location: Left arm   Patient Position: Sitting   Cuff Size: Adult   Pulse: 54   Temp: 98 °F (36.7 °C)   TempSrc: Infrared   SpO2: 95%   Weight: 105 kg (230 lb 12.8 oz)   Height: 182.9 cm (72.01\")   PainSc: 0-No pain     Body mass index is 31.3 kg/m².  BMI is >= 30 and <35. (Class 1 Obesity). The following options were offered after discussion;: weight loss educational material " (shared in after visit summary), exercise counseling/recommendations, nutrition counseling/recommendations, and Information on healthy weight added to patient's after visit summary.          Current Outpatient Medications:     allopurinol (ZYLOPRIM) 100 MG tablet, Take 1 tablet by mouth Daily., Disp: 90 tablet, Rfl: 3    doxazosin (CARDURA) 4 MG tablet, Take 1 tablet by mouth Every Night., Disp: 90 tablet, Rfl: 3    fexofenadine (ALLEGRA) 180 MG tablet, Take 1 tablet by mouth Daily., Disp: , Rfl:     folic acid (FOLVITE) 1 MG tablet, Take 1 tablet by mouth Daily., Disp: , Rfl:     olmesartan (Benicar) 40 MG tablet, Take 1 tablet by mouth Daily., Disp: 90 tablet, Rfl: 1    vitamin B-12 (CYANOCOBALAMIN) 500 MCG tablet, Take 1 tablet by mouth Daily., Disp: 90 tablet, Rfl: 1    warfarin (COUMADIN) 4 MG tablet, TAKE 1 TABLET BY MOUTH DAILY FOR A TOTAL DOSE OF 9MG, Disp: 90 tablet, Rfl: 1    warfarin (COUMADIN) 5 MG tablet, TAKE 1 TABLET BY MOUTH DAILY FOR A TOTAL DOSE OF 9MG, Disp: 90 tablet, Rfl: 1    Physical Exam:    Physical Exam  Vitals and nursing note reviewed.   Constitutional:       Appearance: He is well-developed.   HENT:      Head: Normocephalic.   Eyes:      Conjunctiva/sclera: Conjunctivae normal.      Pupils: Pupils are equal, round, and reactive to light.   Neck:      Thyroid: No thyromegaly.   Cardiovascular:      Rate and Rhythm: Normal rate and regular rhythm.      Heart sounds: Normal heart sounds.   Pulmonary:      Effort: Pulmonary effort is normal.      Breath sounds: Normal breath sounds. No wheezing.   Musculoskeletal:         General: Normal range of motion.      Cervical back: Normal range of motion and neck supple.      Right knee: Deformity present. No swelling. No tenderness.      Left knee: Deformity present. No swelling. No tenderness.   Lymphadenopathy:      Cervical: No cervical adenopathy.   Skin:     General: Skin is warm and dry.   Neurological:      Mental Status: He is alert and  oriented to person, place, and time.      Gait: Gait is intact.   Psychiatric:         Attention and Perception: Attention normal.         Mood and Affect: Mood normal.         Thought Content: Thought content normal.          ACE III MINI        Results Review:    I reviewed the patient's new clinical results.  Results  Laboratory Studies  Blood sugar was 96. Creatinine has gone up and GFR has gone down. BUN elevated. Liver enzymes are normal. Red blood counts are down. INR is over 2. LDL cholesterol improved from 125 down to 97. HDL cholesterol is at 78. No protein loss in urine. Thyroid levels are good. B12 levels are good. Uric acid is a little higher. White blood cell count is good. Urinalysis was fine, no infection or blood in the urine.       CMP:  Lab Results   Component Value Date    Glucose 96 02/04/2025    Glucose, UA Negative 02/04/2025    BUN 38 (H) 02/04/2025    BUN/Creatinine Ratio 29.7 (H) 02/04/2025    Creatinine 1.28 (H) 02/04/2025    Creatinine 0.90 12/07/2016    Creatinine, Urine 94.3 02/04/2025    Ketones, UA Negative 02/04/2025    CO2 24.3 02/04/2025    Calcium 9.5 02/04/2025    Albumin 4.1 02/04/2025    AST (SGOT) 26 02/04/2025    ALT (SGPT) 17 02/04/2025     HbA1c:  Lab Results   Component Value Date    HGBA1C 5.80 (H) 07/19/2022     Microalbumin:  Lab Results   Component Value Date    MICROALBUR <1.2 02/04/2025     Lipid Panel  Lab Results   Component Value Date    CHOL 183 02/04/2025    TRIG 38 02/04/2025    HDL 78 (H) 02/04/2025    LDL 97 02/04/2025    AST 26 02/04/2025    ALT 17 02/04/2025       Medication Review: Medications reviewed and noted  Patient Instructions   Problem List Items Addressed This Visit          Cardiac and Vasculature    Mixed hyperlipidemia (Chronic)    Overview     Lifestyle control.         Relevant Orders    Lipid Panel (Completed)    TSH (Completed)    Vitamin B12 (Completed)    Benign essential hypertension    Overview     Taking olmesartan daily and doxazosin  every evening.           Relevant Medications    doxazosin (CARDURA) 4 MG tablet    olmesartan (Benicar) 40 MG tablet    Other Relevant Orders    CBC & Differential (Completed)    Comprehensive Metabolic Panel (Completed)    Microalbumin / Creatinine Urine Ratio - Urine, Clean Catch (Completed)    Urinalysis With Microscopic - Urine, Clean Catch (Completed)    Chronic atrial fibrillation    Overview     Anticoagulation with Coumadin and regular PT/INR checks.              Coag and Thromboembolic    Anticoagulated on warfarin    Overview      regular PT/INR.            Hematology and Neoplasia    Mild anemia    Relevant Medications    folic acid (FOLVITE) 1 MG tablet    vitamin B-12 (CYANOCOBALAMIN) 500 MCG tablet    Other Relevant Orders    Ambulatory Referral to Hematology / Oncology       Musculoskeletal and Injuries    Personal history of gout (Chronic)    Overview     Taking allopurinol 100mg daily.         Relevant Orders    Uric Acid (Completed)    Chronic pain of both knees - Primary (Chronic)       Other    Overweight with body mass index (BMI) of 29 to 29.9 in adult (Chronic)          Diagnosis Plan   1. Chronic pain of both knees        2. Benign essential hypertension  CBC & Differential    Comprehensive Metabolic Panel    Microalbumin / Creatinine Urine Ratio - Urine, Clean Catch    Urinalysis With Microscopic - Urine, Clean Catch    olmesartan (Benicar) 40 MG tablet      3. Chronic atrial fibrillation        4. Mixed hyperlipidemia  Lipid Panel    TSH    Vitamin B12      5. Mild anemia  Ambulatory Referral to Hematology / Oncology      6. Personal history of gout  Uric Acid      7. Anticoagulated on warfarin        8. Overweight with body mass index (BMI) of 29 to 29.9 in adult          Assessment & Plan  Bilateral mild knee arthritis.  He reports knee stiffness and soreness after walking 18 holes of golf, which resolves after a couple of days. There is no swelling, and he has not used any treatments  like cold packs or medications. He is advised to use a cold pack after walking to decrease inflammation and aching. Over-the-counter creams like Biofreeze or Australian Emu oil may be tried for symptomatic relief.  He may also take Tylenol as needed.  He is encouraged to continue his exercises and stay active. If symptoms worsen, physical therapy will be considered.    Benign essential hypertension.  His blood pressure is well-controlled at 128/72 mmHg. He takes doxazosin at night and olmesartan in the morning. He reports no side effects or lightheadedness. He is advised to continue his current medication regimen.  Continue to avoid salt in the diet.    Chronic atrial fibrillation  He will continue warfarin and regular PT/INR checks.  Continue to avoid excessive caffeine and chocolate.    Mild Anemia.  His red blood cell count remains mildly low, but there is no evidence of gastrointestinal bleeding or iron deficiency.  Colonoscopy done in 2022.  Iron levels done in 2024 were normal.  He has no upper GI symptoms.  A referral to Dr. August, a hematologist at [facility], will be made for further evaluation.    Mixed hyperlipidemia  His LDL cholesterol has improved from 125 to 97, meeting the goal of less than 100. His HDL cholesterol is 78, exceeding the goal of over 45 for men.  He will continue to improve low-fat healthy diet with lots of vegetables.  Continue to avoid excessive red meat in the diet.  Continue regular exercise and walking.    Gout and hyperuricemia.  His uric acid levels are slightly elevated, but he has not experienced any gout flare-ups. He will continue his current dose of allopurinol.    Health maintenance.   He is up to date on all immunizations. He will continue his current dose of warfarin. A note will be made to include hepatitis C screening in his next lab order.    Mild renal insufficiency.  His kidney function has declined some, with elevated creatinine and BUN levels and a decreased GFR.  This suggests inadequate fluid intake. He is advised to increase his fluid intake by at least another pint daily.    Follow-up  The patient will follow up in 6 months.    PROCEDURE  Colonoscopy was performed in 2022.         Plan of care reviewed with patient at the conclusion of today's visit. Education was provided regarding diagnosis, management, and any prescribed or recommended OTC medications. Patient verbalizes understanding of and agreement with management plan.         02/07/25   15:39 EST    Patient or patient representative verbalized consent for the use of Ambient Listening during the visit with  Barbi Carlson MD for chart documentation. 2/7/2025  21:14 EST

## 2025-02-08 NOTE — PATIENT INSTRUCTIONS
Patient Instructions  Problem List Items Addressed This Visit          Cardiac and Vasculature    Mixed hyperlipidemia (Chronic)    Overview     Lifestyle control.         Relevant Orders    Lipid Panel (Completed)    TSH (Completed)    Vitamin B12 (Completed)    Benign essential hypertension    Overview     Taking olmesartan daily and doxazosin every evening.           Relevant Medications    doxazosin (CARDURA) 4 MG tablet    olmesartan (Benicar) 40 MG tablet    Other Relevant Orders    CBC & Differential (Completed)    Comprehensive Metabolic Panel (Completed)    Microalbumin / Creatinine Urine Ratio - Urine, Clean Catch (Completed)    Urinalysis With Microscopic - Urine, Clean Catch (Completed)    Chronic atrial fibrillation    Overview     Anticoagulation with Coumadin and regular PT/INR checks.              Coag and Thromboembolic    Anticoagulated on warfarin    Overview      regular PT/INR.            Hematology and Neoplasia    Mild anemia    Relevant Medications    folic acid (FOLVITE) 1 MG tablet    vitamin B-12 (CYANOCOBALAMIN) 500 MCG tablet    Other Relevant Orders    Ambulatory Referral to Hematology / Oncology       Musculoskeletal and Injuries    Personal history of gout (Chronic)    Overview     Taking allopurinol 100mg daily.         Relevant Orders    Uric Acid (Completed)    Chronic pain of both knees - Primary (Chronic)       Other    Overweight with body mass index (BMI) of 29 to 29.9 in adult (Chronic)       Exercising to Stay Healthy  To become healthy and stay healthy, it is recommended that you do moderate-intensity and vigorous-intensity exercise. You can tell that you are exercising at a moderate intensity if your heart starts beating faster and you start breathing faster but can still hold a conversation. You can tell that you are exercising at a vigorous intensity if you are breathing much harder and faster and cannot hold a conversation while exercising.  How can exercise benefit  me?  Exercising regularly is important. It has many health benefits, such as:  Improving overall fitness, flexibility, and endurance.  Increasing bone density.  Helping with weight control.  Decreasing body fat.  Increasing muscle strength and endurance.  Reducing stress and tension, anxiety, depression, or anger.  Improving overall health.  What guidelines should I follow while exercising?  Before you start a new exercise program, talk with your health care provider.  Do not exercise so much that you hurt yourself, feel dizzy, or get very short of breath.  Wear comfortable clothes and wear shoes with good support.  Drink plenty of water while you exercise to prevent dehydration or heat stroke.  Work out until your breathing and your heartbeat get faster (moderate intensity).  How often should I exercise?  Choose an activity that you enjoy, and set realistic goals. Your health care provider can help you make an activity plan that is individually designed and works best for you.  Exercise regularly as told by your health care provider. This may include:  Doing strength training two times a week, such as:  Lifting weights.  Using resistance bands.  Push-ups.  Sit-ups.  Yoga.  Doing a certain intensity of exercise for a given amount of time. Choose from these options:  A total of 150 minutes of moderate-intensity exercise every week.  A total of 75 minutes of vigorous-intensity exercise every week.  A mix of moderate-intensity and vigorous-intensity exercise every week.  Children, pregnant women, people who have not exercised regularly, people who are overweight, and older adults may need to talk with a health care provider about what activities are safe to perform. If you have a medical condition, be sure to talk with your health care provider before you start a new exercise program.  What are some exercise ideas?  Moderate-intensity exercise ideas include:  Walking 1 mile (1.6 km) in about 15  minutes.  Biking.  Hiking.  Golfing.  Dancing.  Water aerobics.  Vigorous-intensity exercise ideas include:  Walking 4.5 miles (7.2 km) or more in about 1 hour.  Jogging or running 5 miles (8 km) in about 1 hour.  Biking 10 miles (16.1 km) or more in about 1 hour.  Lap swimming.  Roller-skating or in-line skating.  Cross-country skiing.  Vigorous competitive sports, such as football, basketball, and soccer.  Jumping rope.  Aerobic dancing.  What are some everyday activities that can help me get exercise?  Yard work, such as:  Pushing a .  Raking and bagging leaves.  Washing your car.  Pushing a stroller.  Shoveling snow.  Gardening.  Washing windows or floors.  How can I be more active in my day-to-day activities?  Use stairs instead of an elevator.  Take a walk during your lunch break.  If you drive, park your car farther away from your work or school.  If you take public transportation, get off one stop early and walk the rest of the way.  Stand up or walk around during all of your indoor phone calls.  Get up, stretch, and walk around every 30 minutes throughout the day.  Enjoy exercise with a friend. Support to continue exercising will help you keep a regular routine of activity.  Where to find more information  You can find more information about exercising to stay healthy from:  [place] Department of Health and Human Services: www.hhs.gov  Centers for Disease Control and Prevention (CDC): www.cdc.gov  Summary  Exercising regularly is important. It will improve your overall fitness, flexibility, and endurance.  Regular exercise will also improve your overall health. It can help you control your weight, reduce stress, and improve your bone density.  Do not exercise so much that you hurt yourself, feel dizzy, or get very short of breath.  Before you start a new exercise program, talk with your health care provider.  This information is not intended to replace advice given to you by your health care  "provider. Make sure you discuss any questions you have with your health care provider.  Document Revised: 04/15/2022 Document Reviewed: 04/15/2022  Elsevier Patient Education © 2023 [email or web address] BMI for Adults  Body mass index (BMI) is a number found using a person's weight and height. BMI can help tell how much of a person's weight is made up of fat. BMI does not measure body fat directly. It is used instead of tests that directly measure body fat, which can be difficult and expensive.  What are BMI measurements used for?  BMI is useful to:  Find out if your weight puts you at higher risk for medical problems.  Help recommend changes, such as in diet and exercise. This can help you reach a healthy weight. BMI screening can be done again to see if these changes are working.  How is BMI calculated?  Your height and weight are measured. The BMI is found from those numbers. This can be done with [place] or metric measurements. Note that charts and online BMI calculators are available to help you find your BMI quickly and easily without doing these calculations.  To calculate your BMI in [place] measurements:  Measure your weight in pounds (lb).  Multiply the number of pounds by 703.  So, for an adult who weighs 150 lb, multiply that number by 703: 150 x 703, which equals 105,450.  Measure your height in inches. Then multiply that number by itself to get a measurement called \"inches squared.\"  So, for an adult who is 70 inches tall, the \"inches squared\" measurement is 70 inches x 70 inches, which equals 4,900 inches squared.  Divide the total from step 2 (number of lb x 703) by the total from step 3 (inches squared): 105,450 ÷ 4,900 = 21.5. This is your BMI.  To calculate your BMI in metric measurements:    Measure your weight in kilograms (kg).  For this example, the weight is 70 kg.  Measure your height in meters (m). Then multiply that number by itself to get a measurement called \"meters squared.\"  So, for an adult who is " "1.75 m tall, the \"meters squared\" measurement is 1.75 m x 1.75 m, which equals 3.1 meters squared.  Divide the number of kilograms (your weight) by the meters squared number. In this example: 70 ÷ 3.1 = 22.6. This is your BMI.  What do the results mean?  BMI charts are used to see if you are underweight, normal weight, overweight, or obese. The following guidelines will be used:  Underweight: BMI less than 18.5.  Normal weight: BMI between 18.5 and 24.9.  Overweight: BMI between 25 and 29.9.  Obese: BMI of 30 or above.  BMI is a tool and cannot diagnose a condition. Talk with your health care provider about what your BMI means for you. Keep these notes in mind:  Weight includes fat and muscle. Someone with a muscular build, such as an athlete, may have a BMI that is higher than 24.9. In cases like these, BMI is not a correct measure of body fat.  If you have a BMI of 25 or higher, your provider may need to do more testing to find out if excess body fat is the cause.  BMI is measured the same way for males and females. Females usually have more body fat than males of the same height and weight.  Where to find more information  For more information about BMI, including tools to quickly find your BMI, go to:  Centers for Disease Control and Prevention: cdc.gov  American Heart Association: heart.org  National Heart, Lung, and Blood [place]: nhlbi.nih.gov  This information is not intended to replace advice given to you by your health care provider. Make sure you discuss any questions you have with your health care provider.  Document Revised: 09/07/2023 Document Reviewed: 08/31/2023  [employer] Patient Education © 2024 [employer]Heart-Healthy Eating Plan  Many factors influence your heart (coronary) health, including eating and exercise habits. Coronary risk increases with abnormal blood fat (lipid) levels. Heart-healthy meal planning includes limiting unhealthy fats, increasing healthy fats, and making other diet and " lifestyle changes.  What is my plan?  Your health care provider may recommend that you:  Limit your fat intake to _________% or less of your total calories each day.  Limit your saturated fat intake to _________% or less of your total calories each day.  Limit the amount of cholesterol in your diet to less than _________ mg per day.  What are tips for following this plan?  Cooking  Cook foods using methods other than frying. Baking, boiling, grilling, and broiling are all good options. Other ways to reduce fat include:  Removing the skin from poultry.  Removing all visible fats from meats.  Steaming vegetables in water or broth.  Meal planning  A plate with examples of foods in a healthy diet.      At meals, imagine dividing your plate into fourths:  Fill one-half of your plate with vegetables and green salads.  Fill one-fourth of your plate with whole grains.  Fill one-fourth of your plate with lean protein foods.  Eat 4-5 servings of vegetables per day. One serving equals 1 cup raw or cooked vegetable, or 2 cups raw leafy greens.  Eat 4-5 servings of fruit per day. One serving equals 1 medium whole fruit, ¼ cup dried fruit, ½ cup fresh, frozen, or canned fruit, or ½ cup 100% fruit juice.  Eat more foods that contain soluble fiber. Examples include apples, broccoli, carrots, beans, peas, and barley. Aim to get 25-30 g of fiber per day.  Increase your consumption of legumes, nuts, and seeds to 4-5 servings per week. One serving of dried beans or legumes equals ½ cup cooked, 1 serving of nuts is ¼ cup, and 1 serving of seeds equals 1 tablespoon.  Fats  Choose healthy fats more often. Choose monounsaturated and polyunsaturated fats, such as olive and canola oils, flaxseeds, walnuts, almonds, and seeds.  Eat more omega-3 fats. Choose salmon, mackerel, sardines, tuna, flaxseed oil, and ground flaxseeds. Aim to eat fish at least 2 times each week.  Check food labels carefully to identify foods with trans fats or high  amounts of saturated fat.  Limit saturated fats. These are found in animal products, such as meats, butter, and cream. Plant sources of saturated fats include palm oil, palm kernel oil, and coconut oil.  Avoid foods with partially hydrogenated oils in them. These contain trans fats. Examples are stick margarine, some tub margarines, cookies, crackers, and other baked goods.  Avoid fried foods.  General information  Eat more home-cooked food and less restaurant, buffet, and fast food.  Limit or avoid alcohol.  Limit foods that are high in starch and sugar.  Lose weight if you are overweight. Losing just 5-10% of your body weight can help your overall health and prevent diseases such as diabetes and heart disease.  Monitor your salt (sodium) intake, especially if you have high blood pressure. Talk with your health care provider about your sodium intake.  Try to incorporate more vegetarian meals weekly.  What foods can I eat?  Fruits  All fresh, canned (in natural juice), or frozen fruits.  Vegetables  Fresh or frozen vegetables (raw, steamed, roasted, or grilled). Green salads.  Grains  Most grains. Choose whole wheat and whole grains most of the time. Rice and pasta, including brown rice and pastas made with whole wheat.  Meats and other proteins  Lean, well-trimmed beef, veal, pork, and lamb. Chicken and turkey without skin. All fish and shellfish. Wild duck, rabbit, pheasant, and venison. Egg whites or low-cholesterol egg substitutes. Dried beans, peas, lentils, and tofu. Seeds and most nuts.  Dairy  Low-fat or nonfat cheeses, including ricotta and mozzarella. Skim or 1% milk (liquid, powdered, or evaporated). Buttermilk made with low-fat milk. Nonfat or low-fat yogurt.  Fats and oils  Non-hydrogenated (trans-free) margarines. Vegetable oils, including soybean, sesame, sunflower, olive, peanut, safflower, corn, canola, and cottonseed. Salad dressings or mayonnaise made with a vegetable oil.  Beverages  Water  (mineral or sparkling). Coffee and tea. Diet carbonated beverages.  Sweets and desserts  Sherbet, gelatin, and fruit ice. Small amounts of dark chocolate.  Limit all sweets and desserts.  Seasonings and condiments  All seasonings and condiments.  The items listed above may not be a complete list of foods and beverages you can eat. Contact a dietitian for more options.  What foods are not recommended?  Fruits  Canned fruit in heavy syrup. Fruit in cream or butter sauce. Fried fruit. Limit coconut.  Vegetables  Vegetables cooked in cheese, cream, or butter sauce. Fried vegetables.  Grains  Breads made with saturated or trans fats, oils, or whole milk. Croissants. Sweet rolls. Donuts. High-fat crackers, such as cheese crackers.  Meats and other proteins  Fatty meats, such as hot dogs, ribs, sausage, vargas, rib-eye roast or steak. High-fat deli meats, such as salami and bologna. Caviar. Domestic duck and goose. Organ meats, such as liver.  Dairy  Cream, sour cream, cream cheese, and creamed cottage cheese. Whole-milk cheeses. Whole or 2% milk (liquid, evaporated, or condensed). Whole buttermilk. Cream sauce or high-fat cheese sauce. Whole-milk yogurt.  Fats and oils  Meat fat, or shortening. Cocoa butter, hydrogenated oils, palm oil, coconut oil, palm kernel oil. Solid fats and shortenings, including vargas fat, salt pork, lard, and butter. Nondairy cream substitutes. Salad dressings with cheese or sour cream.  Beverages  Regular sodas and any drinks with added sugar.  Sweets and desserts  Frosting. Pudding. Cookies. Cakes. Pies. Milk chocolate or white chocolate. Buttered syrups. Full-fat ice cream or ice cream drinks.  The items listed above may not be a complete list of foods and beverages to avoid. Contact a dietitian for more information.  Summary  Heart-healthy meal planning includes limiting unhealthy fats, increasing healthy fats, and making other diet and lifestyle changes.  Lose weight if you are overweight.  Losing just 5-10% of your body weight can help your overall health and prevent diseases such as diabetes and heart disease.  Focus on eating a balance of foods, including fruits and vegetables, low-fat or nonfat dairy, lean protein, nuts and legumes, whole grains, and heart-healthy oils and fats.  This information is not intended to replace advice given to you by your health care provider. Make sure you discuss any questions you have with your health care provider.  Document Revised: 04/28/2022 Document Reviewed: 04/28/2022  Elsevier Patient Education © 2022 [employer]

## 2025-03-06 ENCOUNTER — LAB (OUTPATIENT)
Dept: LAB | Facility: HOSPITAL | Age: 80
End: 2025-03-06
Payer: MEDICARE

## 2025-03-06 DIAGNOSIS — Z79.01 ANTICOAGULATED ON WARFARIN: ICD-10-CM

## 2025-03-06 DIAGNOSIS — Z51.81 THERAPEUTIC DRUG MONITORING: Primary | ICD-10-CM

## 2025-03-06 LAB
INR PPP: 2.4 (ref 0.89–1.12)
PROTHROMBIN TIME: 26.2 SECONDS (ref 12.2–14.5)

## 2025-03-06 PROCEDURE — 85610 PROTHROMBIN TIME: CPT

## 2025-03-06 PROCEDURE — 36415 COLL VENOUS BLD VENIPUNCTURE: CPT

## 2025-03-18 ENCOUNTER — LAB (OUTPATIENT)
Dept: LAB | Facility: HOSPITAL | Age: 80
End: 2025-03-18
Payer: MEDICARE

## 2025-03-18 ENCOUNTER — CONSULT (OUTPATIENT)
Dept: ONCOLOGY | Facility: CLINIC | Age: 80
End: 2025-03-18
Payer: MEDICARE

## 2025-03-18 VITALS
HEART RATE: 68 BPM | DIASTOLIC BLOOD PRESSURE: 75 MMHG | BODY MASS INDEX: 30.2 KG/M2 | HEIGHT: 72 IN | TEMPERATURE: 97.8 F | WEIGHT: 223 LBS | OXYGEN SATURATION: 96 % | SYSTOLIC BLOOD PRESSURE: 189 MMHG | RESPIRATION RATE: 16 BRPM

## 2025-03-18 DIAGNOSIS — D53.9 MACROCYTIC ANEMIA: Primary | ICD-10-CM

## 2025-03-18 DIAGNOSIS — D53.9 MACROCYTIC ANEMIA: ICD-10-CM

## 2025-03-18 LAB
BASOPHILS # BLD AUTO: 0.04 10*3/MM3 (ref 0–0.2)
BASOPHILS NFR BLD AUTO: 0.5 % (ref 0–1.5)
DEPRECATED RDW RBC AUTO: 50 FL (ref 37–54)
EOSINOPHIL # BLD AUTO: 0.68 10*3/MM3 (ref 0–0.4)
EOSINOPHIL NFR BLD AUTO: 8.2 % (ref 0.3–6.2)
ERYTHROCYTE [DISTWIDTH] IN BLOOD BY AUTOMATED COUNT: 13.8 % (ref 12.3–15.4)
FERRITIN SERPL-MCNC: 305.4 NG/ML (ref 30–400)
FOLATE SERPL-MCNC: 15.8 NG/ML (ref 4.78–24.2)
HCT VFR BLD AUTO: 37.8 % (ref 37.5–51)
HGB BLD-MCNC: 12.7 G/DL (ref 13–17.7)
IMM GRANULOCYTES # BLD AUTO: 0.03 10*3/MM3 (ref 0–0.05)
IMM GRANULOCYTES NFR BLD AUTO: 0.4 % (ref 0–0.5)
IRON 24H UR-MRATE: 85 MCG/DL (ref 59–158)
IRON SATN MFR SERPL: 28 % (ref 20–50)
LYMPHOCYTES # BLD AUTO: 3.63 10*3/MM3 (ref 0.7–3.1)
LYMPHOCYTES NFR BLD AUTO: 43.8 % (ref 19.6–45.3)
MCH RBC QN AUTO: 33.2 PG (ref 26.6–33)
MCHC RBC AUTO-ENTMCNC: 33.6 G/DL (ref 31.5–35.7)
MCV RBC AUTO: 98.7 FL (ref 79–97)
MONOCYTES # BLD AUTO: 0.4 10*3/MM3 (ref 0.1–0.9)
MONOCYTES NFR BLD AUTO: 4.8 % (ref 5–12)
NEUTROPHILS NFR BLD AUTO: 3.5 10*3/MM3 (ref 1.7–7)
NEUTROPHILS NFR BLD AUTO: 42.3 % (ref 42.7–76)
PLATELET # BLD AUTO: 161 10*3/MM3 (ref 140–450)
PMV BLD AUTO: 9.8 FL (ref 6–12)
RBC # BLD AUTO: 3.83 10*6/MM3 (ref 4.14–5.8)
TIBC SERPL-MCNC: 307 MCG/DL (ref 298–536)
TRANSFERRIN SERPL-MCNC: 206 MG/DL (ref 200–360)
VIT B12 BLD-MCNC: 708 PG/ML (ref 211–946)
WBC NRBC COR # BLD AUTO: 8.28 10*3/MM3 (ref 3.4–10.8)

## 2025-03-18 PROCEDURE — 3078F DIAST BP <80 MM HG: CPT | Performed by: INTERNAL MEDICINE

## 2025-03-18 PROCEDURE — 84466 ASSAY OF TRANSFERRIN: CPT

## 2025-03-18 PROCEDURE — 1126F AMNT PAIN NOTED NONE PRSNT: CPT | Performed by: INTERNAL MEDICINE

## 2025-03-18 PROCEDURE — 3077F SYST BP >= 140 MM HG: CPT | Performed by: INTERNAL MEDICINE

## 2025-03-18 PROCEDURE — 83540 ASSAY OF IRON: CPT

## 2025-03-18 PROCEDURE — 99204 OFFICE O/P NEW MOD 45 MIN: CPT | Performed by: INTERNAL MEDICINE

## 2025-03-18 PROCEDURE — 36415 COLL VENOUS BLD VENIPUNCTURE: CPT

## 2025-03-18 PROCEDURE — 82607 VITAMIN B-12: CPT

## 2025-03-18 PROCEDURE — 82728 ASSAY OF FERRITIN: CPT

## 2025-03-18 PROCEDURE — 85025 COMPLETE CBC W/AUTO DIFF WBC: CPT

## 2025-03-18 PROCEDURE — 82746 ASSAY OF FOLIC ACID SERUM: CPT

## 2025-03-18 NOTE — PROGRESS NOTES
DATE OF CONSULTATION: 3/18/2025    REFERRING PHYSICIAN: Barbi Carlson MD    Dear Barbi Garcia MD  Thank you for asking for my medical advice on this patient. I saw him in the  [facility] office on 3/18/2025    REASON FOR CONSULTATION: Macrocytic anemia    PROBLEM LIST:   1.  Macrocytic anemia  2.  Chronic kidney disease stage IIIa  3.  BPH  4.  Atrial fibrillation:  A.  Patient is on chronic anticoagulation with warfarin    HISTORY OF PRESENT ILLNESS: The patient is a very pleasant 80 y.o.  male   who was in his usual state of health until  February 4, 2025.  The patient present for routine follow-up visit with his primary care provider.  The patient had blood work done that revealed microcytic anemia.  This has persisted on mild to previous labs.  The patient was referred to me for further recommendation.    SUBJECTIVE: When I saw the patient today he is here by himself but he is doing well.  Any bleeding.  Never had blood transfusion before.    Review of Systems   Constitutional:  Positive for fatigue.   Respiratory: Negative.     Musculoskeletal:  Positive for arthralgias.   Neurological: Negative.        Past Medical History:   Diagnosis Date    A-fib 02/11/2004    chronic coumadin; failed cardioversion; normal echo and exercise nuclear stress test.  He opted not to have ablation.    A-fib     failed cardioversion, normal echo and exercise nuclear stress test.  He opted not to have ablation    Abnormal CT scan, lung 02/09/2017    RLL opacity with air bronchograms     Allergic Soy    Wheat    Allergic rhinitis     Allergy to wheat     as well as soybean    Anemia     BPH (benign prostatic hyperplasia)     Cobalamin deficiency 11/15/2016    Cough 2015    sec/to inflammatory pnuemonitis    Elbow effusion, left 09/2015    presumed sec/to gout    Gout 2015    right ankle and righ knee (also of right hand-remote); colchicine prn.Uric acid lowering with allopurinol    H/O eye surgery 2003    [place]      as child; resolved when family moved to [place] from [place]    Hypertension     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 Rod Whitten    Macrocytic anemia 2016    Medial meniscus tear 2003    right; arthroscopy knee    Mild anemia 2022    Sensitivity to sunlight     sun sensitivity rash; sun avoidance, for many years    Shoulder fracture, right     childhood    Spongiotic dermatitis     Tonsillitis     childhood; Tonsillectomy    Vitamin B12 deficiency     Vitamin D deficiency 2010       Social History     Socioeconomic History    Marital status:    Tobacco Use    Smoking status: Former     Current packs/day: 0.00     Average packs/day: 0.5 packs/day for 22.0 years (11.0 ttl pk-yrs)     Types: Cigarettes     Start date: 1966     Quit date: 1978     Years since quittin.2    Smokeless tobacco: Never    Tobacco comments:     no passive smoke exposure, quit 79   Vaping Use    Vaping status: Never Used   Substance and Sexual Activity    Alcohol use: Yes     Alcohol/week: 2.0 standard drinks of alcohol     Types: 2 Glasses of wine per week     Comment: daily    Drug use: No    Sexual activity: Not Currently     Birth control/protection: Vasectomy       Family History   Problem Relation Age of Onset    Heart failure Father     Coronary artery disease Father         COD    Stroke Sister 63    Cancer Sister         Lung Cancer    Hearing loss Brother         Nicolle       Past Surgical History:   Procedure Laterality Date    BASAL CELL CARCINOMA EXCISION Left     L face    COLONOSCOPY      EYE SURGERY  2023    Cataract Removal - both    HERNIA REPAIR      INGUINAL HERNIA REPAIR    "   INGUINAL HERNIA REPAIR Right 5/20/2024    Procedure: RECURRENT RIGHT INGUINAL HERNIA REPAIR WITH MESH;  Surgeon: Lance Boykin MD;  Location:  [facility];  Service: General;  Laterality: Right;    KNEE ARTHROSCOPY Right 2003    due to meniscus tear    TONSILLECTOMY      as a child    UMBILICAL HERNIA REPAIR N/A 5/20/2024    Procedure: UMBILICAL HERNIA REPAIR WITH MESH;  Surgeon: Lance Boykin MD;  Location:  [facility];  Service: General;  Laterality: N/A;    VASECTOMY         Allergies   Allergen Reactions    Other Rash     Soy, mold, and wheat          Current Outpatient Medications:     allopurinol (ZYLOPRIM) 100 MG tablet, Take 1 tablet by mouth Daily., Disp: 90 tablet, Rfl: 3    doxazosin (CARDURA) 4 MG tablet, Take 1 tablet by mouth Every Night., Disp: 90 tablet, Rfl: 3    fexofenadine (ALLEGRA) 180 MG tablet, Take 1 tablet by mouth Daily., Disp: , Rfl:     folic acid (FOLVITE) 1 MG tablet, Take 1 tablet by mouth Daily., Disp: , Rfl:     olmesartan (Benicar) 40 MG tablet, Take 1 tablet by mouth Daily., Disp: 90 tablet, Rfl: 1    vitamin B-12 (CYANOCOBALAMIN) 500 MCG tablet, Take 1 tablet by mouth Daily., Disp: 90 tablet, Rfl: 1    warfarin (COUMADIN) 4 MG tablet, TAKE 1 TABLET BY MOUTH DAILY FOR A TOTAL DOSE OF 9MG, Disp: 90 tablet, Rfl: 1    warfarin (COUMADIN) 5 MG tablet, TAKE 1 TABLET BY MOUTH DAILY FOR A TOTAL DOSE OF 9MG, Disp: 90 tablet, Rfl: 1    PHYSICAL EXAMINATION:   BP (!) 189/75 Comment: LUE  Pulse 68   Temp 97.8 °F (36.6 °C) (Temporal)   Resp 16   Ht 182.9 cm (72\")   Wt 101 kg (223 lb)   SpO2 96% Comment: RA  BMI 30.24 kg/m²   Pain Score    03/18/25 1029   PainSc: 0-No pain                   ECOG Performance Status: 1 - Symptomatic but completely ambulatory  General Appearance:  alert, cooperative, no apparent distress and appears stated age   Neurologic/Psychiatric: A&O x 3, gait steady, appropriate affect, strength 5/5 in all muscle groups   HEENT:  Normocephalic, " without obvious abnormality, mucous membranes moist   Neck: Supple, symmetrical, trachea midline, no adenopathy;  No thyromegaly, masses, or tenderness   Lungs:   Clear to auscultation bilaterally; respirations regular, even, and unlabored bilaterally   Heart:  Regular rate and rhythm, no murmurs appreciated   Abdomen:   Soft, non-tender, non-distended, and no organomegaly   Lymph nodes: No cervical, supraclavicular, inguinal or axillary adenopathy noted   Extremities: Normal, atraumatic; no clubbing, cyanosis, or edema    Skin: No rashes, ulcers, or suspicious lesions noted       Lab on 03/06/2025   Component Date Value Ref Range Status    Protime 03/06/2025 26.2 (H)  12.2 - 14.5 Seconds Final    INR 03/06/2025 2.40 (H)  0.89 - 1.12 Final        No results found.      DIAGNOSTIC DATA:   Extensive patient medical records including doctor notes, blood work results, pathology report and imaging reports reviewed by me and documented in the patient's chart.      ASSESSMENT: The patient is a very pleasant 80 y.o.  male  with macrocytic anemia    PLAN:     1.  Macrocytic anemia:  A.  I had a long discussion today with the patient about his  new diagnosis of macrocytic anemia. I reviewed the patient's documents including refereing provider's notes, lab results, imaging, and path report.   B.  I explained to the patient that his macrocytic anemia could be induced by mild underlying myelodysplastic syndrome.  He does have chronic kidney disease which could contribute to anemia.  The patient B12 level was normal but other vitamin deficiencies could contribute to this as well.  C.  I will check the patient CBC today.  I will check reticulocyte count B12 folic acid and methylmalonic acid.  D.  The patient will follow-up with me in 3 months with repeated CBC.  E.  Will consider doing bone marrow biopsy if his hemoglobin drop below 10.    2.  Chronic kidney disease stage IIIa:  A.  The patient might benefit from recombinant  erythropoietin replacement down the road if his hemoglobin drops below 10.    3.  BPH:  A.  Patient will continue Proscar and Flomax.    4.  Atrial fibrillation:  A.  Patient is on chronic anticoagulation with warfarin.  B.  I will check the patient's iron profile today to make sure there is no associated bleeding.    FOLLOW UP: 3 months with labs    Alla August MD  3/18/2025

## 2025-04-03 ENCOUNTER — LAB (OUTPATIENT)
Dept: LAB | Facility: HOSPITAL | Age: 80
End: 2025-04-03
Payer: MEDICARE

## 2025-04-03 DIAGNOSIS — Z51.81 THERAPEUTIC DRUG MONITORING: Primary | ICD-10-CM

## 2025-04-03 DIAGNOSIS — Z79.01 ANTICOAGULATED ON WARFARIN: ICD-10-CM

## 2025-04-03 LAB
INR PPP: 2.29 (ref 0.89–1.12)
PROTHROMBIN TIME: 26.8 SECONDS (ref 12.2–15.3)

## 2025-04-03 PROCEDURE — 85610 PROTHROMBIN TIME: CPT

## 2025-04-03 PROCEDURE — 36415 COLL VENOUS BLD VENIPUNCTURE: CPT

## 2025-05-01 ENCOUNTER — LAB (OUTPATIENT)
Dept: LAB | Facility: HOSPITAL | Age: 80
End: 2025-05-01
Payer: MEDICARE

## 2025-05-01 DIAGNOSIS — Z51.81 THERAPEUTIC DRUG MONITORING: Primary | ICD-10-CM

## 2025-05-01 DIAGNOSIS — Z79.01 ANTICOAGULATED ON WARFARIN: ICD-10-CM

## 2025-05-01 LAB
INR PPP: 2.18 (ref 0.89–1.12)
PROTHROMBIN TIME: 25.8 SECONDS (ref 12.2–15.3)

## 2025-05-01 PROCEDURE — 85610 PROTHROMBIN TIME: CPT

## 2025-05-01 PROCEDURE — 36415 COLL VENOUS BLD VENIPUNCTURE: CPT

## 2025-05-19 RX ORDER — WARFARIN SODIUM 4 MG/1
TABLET ORAL
Qty: 90 TABLET | Refills: 1 | Status: SHIPPED | OUTPATIENT
Start: 2025-05-19

## 2025-05-19 NOTE — TELEPHONE ENCOUNTER
Rx Refill Note  Requested Prescriptions     Pending Prescriptions Disp Refills    warfarin (COUMADIN) 4 MG tablet [Pharmacy Med Name: WARFARIN SODIUM 4 MG TABLET] 90 tablet 1     Sig: TAKE 1 TABLET BY MOUTH DAILY FOR A TOTAL DOSE OF 9MG      Last office visit with prescribing clinician: 2/7/2025   Last telemedicine visit with prescribing clinician: Visit date not found   Next office visit with prescribing clinician: 8/8/2025                         Would you like a call back once the refill request has been completed: [] Yes [] No    If the office needs to give you a call back, can they leave a voicemail: [] Yes [] No    Stephania Conde MA  05/19/25, 08:54 EDT

## 2025-05-30 ENCOUNTER — LAB (OUTPATIENT)
Dept: LAB | Facility: HOSPITAL | Age: 80
End: 2025-05-30
Payer: MEDICARE

## 2025-05-30 DIAGNOSIS — Z79.01 ANTICOAGULATED ON WARFARIN: ICD-10-CM

## 2025-05-30 DIAGNOSIS — Z51.81 THERAPEUTIC DRUG MONITORING: ICD-10-CM

## 2025-05-30 LAB
INR PPP: 2.1 (ref 0.89–1.12)
PROTHROMBIN TIME: 24.9 SECONDS (ref 12.2–15.3)

## 2025-05-30 PROCEDURE — 36415 COLL VENOUS BLD VENIPUNCTURE: CPT

## 2025-05-30 PROCEDURE — 93793 ANTICOAG MGMT PT WARFARIN: CPT | Performed by: INTERNAL MEDICINE

## 2025-05-30 PROCEDURE — 85610 PROTHROMBIN TIME: CPT

## 2025-06-04 RX ORDER — WARFARIN SODIUM 5 MG/1
TABLET ORAL
Qty: 90 TABLET | Refills: 1 | Status: SHIPPED | OUTPATIENT
Start: 2025-06-04

## 2025-06-04 RX ORDER — DOXAZOSIN 4 MG/1
4 TABLET ORAL NIGHTLY
Qty: 90 TABLET | Refills: 3 | Status: SHIPPED | OUTPATIENT
Start: 2025-06-04

## 2025-06-04 RX ORDER — ALLOPURINOL 100 MG/1
100 TABLET ORAL DAILY
Qty: 90 TABLET | Refills: 3 | Status: SHIPPED | OUTPATIENT
Start: 2025-06-04

## 2025-06-04 NOTE — TELEPHONE ENCOUNTER
Rx Refill Note  Requested Prescriptions     Pending Prescriptions Disp Refills    allopurinol (ZYLOPRIM) 100 MG tablet 90 tablet 3     Sig: Take 1 tablet by mouth Daily.    warfarin (COUMADIN) 5 MG tablet 90 tablet 1     Sig: TAKE 1 TABLET BY MOUTH DAILY FOR A TOTAL DOSE OF 9MG    doxazosin (CARDURA) 4 MG tablet 90 tablet 3     Sig: Take 1 tablet by mouth Every Night.      Last office visit with prescribing clinician: 2/7/2025   Last telemedicine visit with prescribing clinician: Visit date not found   Next office visit with prescribing clinician: 6/4/2025                         Would you like a call back once the refill request has been completed: [] Yes [] No    If the office needs to give you a call back, can they leave a voicemail: [] Yes [] No    iMchaelle Xiong LPN  06/04/25, 07:56 EDT

## 2025-06-04 NOTE — TELEPHONE ENCOUNTER
Rx Refill Note  Requested Prescriptions     Pending Prescriptions Disp Refills    warfarin (COUMADIN) 5 MG tablet [Pharmacy Med Name: WARFARIN SODIUM 5 MG TABLET] 90 tablet 1     Sig: TAKE 1 TABLET BY MOUTH DAILY FOR A TOTAL DOSE OF 9MG DAILY      Last office visit with prescribing clinician: 2/7/2025   Last telemedicine visit with prescribing clinician: Visit date not found   Next office visit with prescribing clinician: 8/8/2025                         Would you like a call back once the refill request has been completed: [] Yes [] No    If the office needs to give you a call back, can they leave a voicemail: [] Yes [] No    Maegan Metcalf MA  06/04/25, 08:07 EDT

## 2025-06-19 ENCOUNTER — LAB (OUTPATIENT)
Dept: LAB | Facility: HOSPITAL | Age: 80
End: 2025-06-19
Payer: MEDICARE

## 2025-06-19 DIAGNOSIS — D53.9 MACROCYTIC ANEMIA: ICD-10-CM

## 2025-06-19 DIAGNOSIS — Z79.01 ANTICOAGULATED ON WARFARIN: ICD-10-CM

## 2025-06-19 DIAGNOSIS — Z51.81 THERAPEUTIC DRUG MONITORING: Primary | ICD-10-CM

## 2025-06-19 LAB
ANION GAP SERPL CALCULATED.3IONS-SCNC: 6.9 MMOL/L (ref 5–15)
BASOPHILS # BLD AUTO: 0.04 10*3/MM3 (ref 0–0.2)
BASOPHILS NFR BLD AUTO: 0.6 % (ref 0–1.5)
BUN SERPL-MCNC: 28.1 MG/DL (ref 8–23)
BUN/CREAT SERPL: 26.5 (ref 7–25)
CALCIUM SPEC-SCNC: 9.4 MG/DL (ref 8.6–10.5)
CHLORIDE SERPL-SCNC: 106 MMOL/L (ref 98–107)
CO2 SERPL-SCNC: 25.1 MMOL/L (ref 22–29)
CREAT SERPL-MCNC: 1.06 MG/DL (ref 0.76–1.27)
DEPRECATED RDW RBC AUTO: 52 FL (ref 37–54)
EGFRCR SERPLBLD CKD-EPI 2021: 71.4 ML/MIN/1.73
EOSINOPHIL # BLD AUTO: 0.33 10*3/MM3 (ref 0–0.4)
EOSINOPHIL NFR BLD AUTO: 4.7 % (ref 0.3–6.2)
ERYTHROCYTE [DISTWIDTH] IN BLOOD BY AUTOMATED COUNT: 14.5 % (ref 12.3–15.4)
FERRITIN SERPL-MCNC: 301 NG/ML (ref 30–400)
FOLATE SERPL-MCNC: >20 NG/ML (ref 4.78–24.2)
GLUCOSE SERPL-MCNC: 93 MG/DL (ref 65–99)
HCT VFR BLD AUTO: 38.5 % (ref 37.5–51)
HGB BLD-MCNC: 12.6 G/DL (ref 13–17.7)
IMM GRANULOCYTES # BLD AUTO: 0.03 10*3/MM3 (ref 0–0.05)
IMM GRANULOCYTES NFR BLD AUTO: 0.4 % (ref 0–0.5)
INR PPP: 2.05 (ref 0.89–1.12)
IRON 24H UR-MRATE: 95 MCG/DL (ref 59–158)
IRON SATN MFR SERPL: 29 % (ref 20–50)
LYMPHOCYTES # BLD AUTO: 3.39 10*3/MM3 (ref 0.7–3.1)
LYMPHOCYTES NFR BLD AUTO: 47.9 % (ref 19.6–45.3)
MCH RBC QN AUTO: 32.4 PG (ref 26.6–33)
MCHC RBC AUTO-ENTMCNC: 32.7 G/DL (ref 31.5–35.7)
MCV RBC AUTO: 99 FL (ref 79–97)
MONOCYTES # BLD AUTO: 0.45 10*3/MM3 (ref 0.1–0.9)
MONOCYTES NFR BLD AUTO: 6.4 % (ref 5–12)
NEUTROPHILS NFR BLD AUTO: 2.84 10*3/MM3 (ref 1.7–7)
NEUTROPHILS NFR BLD AUTO: 40 % (ref 42.7–76)
NRBC BLD AUTO-RTO: 0 /100 WBC (ref 0–0.2)
PLATELET # BLD AUTO: 142 10*3/MM3 (ref 140–450)
PMV BLD AUTO: 11.3 FL (ref 6–12)
POTASSIUM SERPL-SCNC: 5.3 MMOL/L (ref 3.5–5.2)
PROTHROMBIN TIME: 24.5 SECONDS (ref 12.2–15.3)
RBC # BLD AUTO: 3.89 10*6/MM3 (ref 4.14–5.8)
RETICS # AUTO: 0.05 10*6/MM3 (ref 0.02–0.13)
RETICS/RBC NFR AUTO: 1.38 % (ref 0.7–1.9)
SODIUM SERPL-SCNC: 138 MMOL/L (ref 136–145)
TIBC SERPL-MCNC: 323 MCG/DL (ref 298–536)
TRANSFERRIN SERPL-MCNC: 217 MG/DL (ref 200–360)
VIT B12 BLD-MCNC: 725 PG/ML (ref 211–946)
WBC NRBC COR # BLD AUTO: 7.08 10*3/MM3 (ref 3.4–10.8)

## 2025-06-19 PROCEDURE — 85610 PROTHROMBIN TIME: CPT

## 2025-06-19 PROCEDURE — 80048 BASIC METABOLIC PNL TOTAL CA: CPT

## 2025-06-19 PROCEDURE — 82728 ASSAY OF FERRITIN: CPT

## 2025-06-19 PROCEDURE — 36415 COLL VENOUS BLD VENIPUNCTURE: CPT

## 2025-06-19 PROCEDURE — 85045 AUTOMATED RETICULOCYTE COUNT: CPT

## 2025-06-19 PROCEDURE — 82607 VITAMIN B-12: CPT

## 2025-06-19 PROCEDURE — 84466 ASSAY OF TRANSFERRIN: CPT

## 2025-06-19 PROCEDURE — 83540 ASSAY OF IRON: CPT

## 2025-06-19 PROCEDURE — 85025 COMPLETE CBC W/AUTO DIFF WBC: CPT

## 2025-06-19 PROCEDURE — 82746 ASSAY OF FOLIC ACID SERUM: CPT

## 2025-07-24 ENCOUNTER — LAB (OUTPATIENT)
Dept: LAB | Facility: HOSPITAL | Age: 80
End: 2025-07-24
Payer: MEDICARE

## 2025-07-24 DIAGNOSIS — Z51.81 THERAPEUTIC DRUG MONITORING: Primary | ICD-10-CM

## 2025-07-24 DIAGNOSIS — Z79.01 ANTICOAGULATED ON WARFARIN: ICD-10-CM

## 2025-07-24 LAB
INR PPP: 3.19 (ref 0.89–1.12)
PROTHROMBIN TIME: 34.9 SECONDS (ref 12.2–15.3)

## 2025-07-24 PROCEDURE — 85610 PROTHROMBIN TIME: CPT

## 2025-07-24 PROCEDURE — 36415 COLL VENOUS BLD VENIPUNCTURE: CPT

## 2025-08-01 DIAGNOSIS — I10 BENIGN ESSENTIAL HYPERTENSION: ICD-10-CM

## 2025-08-01 RX ORDER — OLMESARTAN MEDOXOMIL 40 MG/1
40 TABLET ORAL DAILY
Qty: 90 TABLET | Refills: 1 | Status: SHIPPED | OUTPATIENT
Start: 2025-08-01

## 2025-08-13 ENCOUNTER — LAB (OUTPATIENT)
Dept: LAB | Facility: HOSPITAL | Age: 80
End: 2025-08-13
Payer: MEDICARE

## 2025-08-13 DIAGNOSIS — I10 BENIGN ESSENTIAL HYPERTENSION: ICD-10-CM

## 2025-08-13 DIAGNOSIS — E78.2 MIXED HYPERLIPIDEMIA: Chronic | ICD-10-CM

## 2025-08-13 DIAGNOSIS — Z51.81 THERAPEUTIC DRUG MONITORING: ICD-10-CM

## 2025-08-13 DIAGNOSIS — Z12.5 PROSTATE CANCER SCREENING: ICD-10-CM

## 2025-08-13 DIAGNOSIS — Z79.01 ANTICOAGULATED ON WARFARIN: ICD-10-CM

## 2025-08-13 LAB
ALBUMIN SERPL-MCNC: 4.2 G/DL (ref 3.5–5.2)
ALBUMIN UR-MCNC: <1.2 MG/DL
ALBUMIN/GLOB SERPL: 1.6 G/DL
ALP SERPL-CCNC: 80 U/L (ref 39–117)
ALT SERPL W P-5'-P-CCNC: 19 U/L (ref 1–41)
ANION GAP SERPL CALCULATED.3IONS-SCNC: 10.9 MMOL/L (ref 5–15)
AST SERPL-CCNC: 26 U/L (ref 1–40)
BACTERIA UR QL AUTO: NORMAL /HPF
BASOPHILS # BLD AUTO: 0.05 10*3/MM3 (ref 0–0.2)
BASOPHILS NFR BLD AUTO: 0.7 % (ref 0–1.5)
BILIRUB SERPL-MCNC: 0.6 MG/DL (ref 0–1.2)
BILIRUB UR QL STRIP: NEGATIVE
BUN SERPL-MCNC: 26 MG/DL (ref 8–23)
BUN/CREAT SERPL: 21.5 (ref 7–25)
CALCIUM SPEC-SCNC: 9.5 MG/DL (ref 8.6–10.5)
CHLORIDE SERPL-SCNC: 107 MMOL/L (ref 98–107)
CHOLEST SERPL-MCNC: 211 MG/DL (ref 0–200)
CLARITY UR: CLEAR
CO2 SERPL-SCNC: 24.1 MMOL/L (ref 22–29)
COLOR UR: YELLOW
CREAT SERPL-MCNC: 1.21 MG/DL (ref 0.76–1.27)
CREAT UR-MCNC: 138.9 MG/DL
DEPRECATED RDW RBC AUTO: 50.6 FL (ref 37–54)
EGFRCR SERPLBLD CKD-EPI 2021: 60.9 ML/MIN/1.73
EOSINOPHIL # BLD AUTO: 0.36 10*3/MM3 (ref 0–0.4)
EOSINOPHIL NFR BLD AUTO: 4.9 % (ref 0.3–6.2)
ERYTHROCYTE [DISTWIDTH] IN BLOOD BY AUTOMATED COUNT: 13.9 % (ref 12.3–15.4)
GLOBULIN UR ELPH-MCNC: 2.7 GM/DL
GLUCOSE SERPL-MCNC: 113 MG/DL (ref 65–99)
GLUCOSE UR STRIP-MCNC: NEGATIVE MG/DL
HCT VFR BLD AUTO: 37.7 % (ref 37.5–51)
HCYS SERPL-MCNC: 14.9 UMOL/L (ref 0–15)
HDLC SERPL-MCNC: 80 MG/DL (ref 40–60)
HGB BLD-MCNC: 13 G/DL (ref 13–17.7)
HGB UR QL STRIP.AUTO: NEGATIVE
HYALINE CASTS UR QL AUTO: NORMAL /LPF
IMM GRANULOCYTES # BLD AUTO: 0.02 10*3/MM3 (ref 0–0.05)
IMM GRANULOCYTES NFR BLD AUTO: 0.3 % (ref 0–0.5)
INR PPP: 2.86 (ref 0.89–1.12)
KETONES UR QL STRIP: NEGATIVE
LDLC SERPL CALC-MCNC: 121 MG/DL (ref 0–100)
LDLC/HDLC SERPL: 1.5 {RATIO}
LEUKOCYTE ESTERASE UR QL STRIP.AUTO: ABNORMAL
LYMPHOCYTES # BLD AUTO: 4.05 10*3/MM3 (ref 0.7–3.1)
LYMPHOCYTES NFR BLD AUTO: 54.7 % (ref 19.6–45.3)
MCH RBC QN AUTO: 34.6 PG (ref 26.6–33)
MCHC RBC AUTO-ENTMCNC: 34.5 G/DL (ref 31.5–35.7)
MCV RBC AUTO: 100.3 FL (ref 79–97)
MICROALBUMIN/CREAT UR: NORMAL MG/G{CREAT}
MONOCYTES # BLD AUTO: 0.33 10*3/MM3 (ref 0.1–0.9)
MONOCYTES NFR BLD AUTO: 4.5 % (ref 5–12)
NEUTROPHILS NFR BLD AUTO: 2.6 10*3/MM3 (ref 1.7–7)
NEUTROPHILS NFR BLD AUTO: 34.9 % (ref 42.7–76)
NITRITE UR QL STRIP: NEGATIVE
NRBC BLD AUTO-RTO: 0 /100 WBC (ref 0–0.2)
PH UR STRIP.AUTO: 5.5 [PH] (ref 5–8)
PLATELET # BLD AUTO: 146 10*3/MM3 (ref 140–450)
PMV BLD AUTO: 11 FL (ref 6–12)
POTASSIUM SERPL-SCNC: 4.7 MMOL/L (ref 3.5–5.2)
PROT SERPL-MCNC: 6.9 G/DL (ref 6–8.5)
PROT UR QL STRIP: NEGATIVE
PROTHROMBIN TIME: 32 SECONDS (ref 12.2–15.3)
PSA SERPL-MCNC: 1.8 NG/ML (ref 0–4)
RBC # BLD AUTO: 3.76 10*6/MM3 (ref 4.14–5.8)
RBC # UR STRIP: NORMAL /HPF
REF LAB TEST METHOD: NORMAL
SODIUM SERPL-SCNC: 142 MMOL/L (ref 136–145)
SP GR UR STRIP: 1.02 (ref 1–1.03)
SQUAMOUS #/AREA URNS HPF: NORMAL /HPF
TRIGL SERPL-MCNC: 57 MG/DL (ref 0–150)
UROBILINOGEN UR QL STRIP: ABNORMAL
VLDLC SERPL-MCNC: 10 MG/DL (ref 5–40)
WBC # UR STRIP: NORMAL /HPF
WBC NRBC COR # BLD AUTO: 7.41 10*3/MM3 (ref 3.4–10.8)

## 2025-08-13 PROCEDURE — 81001 URINALYSIS AUTO W/SCOPE: CPT

## 2025-08-13 PROCEDURE — 80061 LIPID PANEL: CPT

## 2025-08-13 PROCEDURE — 82570 ASSAY OF URINE CREATININE: CPT

## 2025-08-13 PROCEDURE — 83090 ASSAY OF HOMOCYSTEINE: CPT

## 2025-08-13 PROCEDURE — 80053 COMPREHEN METABOLIC PANEL: CPT

## 2025-08-13 PROCEDURE — 82043 UR ALBUMIN QUANTITATIVE: CPT

## 2025-08-13 PROCEDURE — 36415 COLL VENOUS BLD VENIPUNCTURE: CPT

## 2025-08-13 PROCEDURE — G0103 PSA SCREENING: HCPCS

## 2025-08-13 PROCEDURE — 85025 COMPLETE CBC W/AUTO DIFF WBC: CPT

## 2025-08-13 PROCEDURE — 85610 PROTHROMBIN TIME: CPT

## 2025-08-15 ENCOUNTER — OFFICE VISIT (OUTPATIENT)
Dept: INTERNAL MEDICINE | Facility: CLINIC | Age: 80
End: 2025-08-15
Payer: MEDICARE

## 2025-08-15 VITALS
HEIGHT: 72 IN | BODY MASS INDEX: 30.23 KG/M2 | DIASTOLIC BLOOD PRESSURE: 56 MMHG | WEIGHT: 223.2 LBS | OXYGEN SATURATION: 98 % | SYSTOLIC BLOOD PRESSURE: 128 MMHG | HEART RATE: 58 BPM | TEMPERATURE: 98.2 F

## 2025-08-15 DIAGNOSIS — E66.09 CLASS 1 OBESITY DUE TO EXCESS CALORIES WITH SERIOUS COMORBIDITY AND BODY MASS INDEX (BMI) OF 30.0 TO 30.9 IN ADULT: Chronic | ICD-10-CM

## 2025-08-15 DIAGNOSIS — Z12.5 PROSTATE CANCER SCREENING: ICD-10-CM

## 2025-08-15 DIAGNOSIS — E78.2 MIXED HYPERLIPIDEMIA: Primary | Chronic | ICD-10-CM

## 2025-08-15 DIAGNOSIS — M25.561 CHRONIC PAIN OF RIGHT KNEE: Chronic | ICD-10-CM

## 2025-08-15 DIAGNOSIS — I10 BENIGN ESSENTIAL HYPERTENSION: ICD-10-CM

## 2025-08-15 DIAGNOSIS — Z79.01 ANTICOAGULATED ON WARFARIN: ICD-10-CM

## 2025-08-15 DIAGNOSIS — G89.29 CHRONIC RIGHT SHOULDER PAIN: Chronic | ICD-10-CM

## 2025-08-15 DIAGNOSIS — M25.511 CHRONIC RIGHT SHOULDER PAIN: Chronic | ICD-10-CM

## 2025-08-15 DIAGNOSIS — G89.29 CHRONIC PAIN OF RIGHT KNEE: Chronic | ICD-10-CM

## 2025-08-15 DIAGNOSIS — E66.811 CLASS 1 OBESITY DUE TO EXCESS CALORIES WITH SERIOUS COMORBIDITY AND BODY MASS INDEX (BMI) OF 30.0 TO 30.9 IN ADULT: Chronic | ICD-10-CM

## 2025-08-15 DIAGNOSIS — I48.20 CHRONIC ATRIAL FIBRILLATION: ICD-10-CM

## 2025-08-23 ENCOUNTER — PATIENT MESSAGE (OUTPATIENT)
Dept: INTERNAL MEDICINE | Facility: CLINIC | Age: 80
End: 2025-08-23
Payer: MEDICARE

## (undated) DEVICE — SUT ETHIB EXCL 0/0 36IN ETX524H

## (undated) DEVICE — 3M™ IOBAN™ 2 ANTIMICROBIAL INCISE DRAPE 6650EZ: Brand: IOBAN™ 2

## (undated) DEVICE — DRSNG SURESITE WNDW 4X4.5

## (undated) DEVICE — SUT MNCRYL PLS ANTIB UD 4/0 PS2 18IN

## (undated) DEVICE — TRAP FLD MINIVAC MEGADYNE 100ML

## (undated) DEVICE — APPL CHLORAPREP TINTED 26ML TEAL

## (undated) DEVICE — STERILE COTTON BALLS LARGE 5/P: Brand: MEDLINE

## (undated) DEVICE — PREP SOL POVIDONE/IODINE BT 4OZ

## (undated) DEVICE — GOWN,NON-REINFORCED,SIRUS,SET IN SLV,XL: Brand: MEDLINE

## (undated) DEVICE — SUT SILK 3/0 TIES 18IN A184H

## (undated) DEVICE — MONOFILAMENT POLYBUTESTER: Brand: NOVAFIL

## (undated) DEVICE — ADHS LIQ MASTISOL 2/3ML

## (undated) DEVICE — TBG PENCL TELESCP MEGADYNE SMOKE EVAC 10FT

## (undated) DEVICE — BNDR ABD PREMIUM/UNIV 10IN 27TO48IN

## (undated) DEVICE — SUT VIC 0 SH 27IN J418H

## (undated) DEVICE — GLV SURG PREMIERPRO MIC LTX PF SZ7.5 BRN

## (undated) DEVICE — PK MINOR SPLT 10

## (undated) DEVICE — ANTIBACTERIAL UNDYED BRAIDED (POLYGLACTIN 910), SYNTHETIC ABSORBABLE SUTURE: Brand: COATED VICRYL

## (undated) DEVICE — 450 ML BOTTLE OF 0.05% CHLORHEXIDINE GLUCONATE IN 99.95% STERILE WATER FOR IRRIGATION, USP AND APPLICATOR.: Brand: IRRISEPT ANTIMICROBIAL WOUND LAVAGE

## (undated) DEVICE — DRN PENRS SIL 1/4X18IN LF STRL

## (undated) DEVICE — PATIENT RETURN ELECTRODE, SINGLE-USE, CONTACT QUALITY MONITORING, ADULT, WITH 9FT CORD, FOR PATIENTS WEIGING OVER 33LBS. (15KG): Brand: MEGADYNE

## (undated) DEVICE — GLV SURG PREMIERPRO MIC LTX PF SZ8 BRN